# Patient Record
Sex: MALE | Race: BLACK OR AFRICAN AMERICAN | NOT HISPANIC OR LATINO | Employment: OTHER | URBAN - METROPOLITAN AREA
[De-identification: names, ages, dates, MRNs, and addresses within clinical notes are randomized per-mention and may not be internally consistent; named-entity substitution may affect disease eponyms.]

---

## 2019-03-01 ENCOUNTER — HOSPITAL ENCOUNTER (EMERGENCY)
Facility: CLINIC | Age: 64
Discharge: HOME | End: 2019-03-01
Attending: EMERGENCY MEDICINE | Admitting: EMERGENCY MEDICINE

## 2019-03-01 VITALS
RESPIRATION RATE: 16 BRPM | WEIGHT: 173 LBS | OXYGEN SATURATION: 98 % | HEART RATE: 83 BPM | TEMPERATURE: 98.6 F | SYSTOLIC BLOOD PRESSURE: 140 MMHG | DIASTOLIC BLOOD PRESSURE: 79 MMHG | HEIGHT: 69 IN | BODY MASS INDEX: 25.62 KG/M2

## 2019-03-01 DIAGNOSIS — L03.213 PRESEPTAL CELLULITIS OF LEFT EYE: ICD-10-CM

## 2019-03-01 PROCEDURE — 99283 EMERGENCY DEPT VISIT LOW MDM: CPT

## 2019-03-01 RX ORDER — CEFDINIR 300 MG/1
300 CAPSULE ORAL 2 TIMES DAILY
Qty: 20 CAPSULE | Refills: 0 | Status: SHIPPED | OUTPATIENT
Start: 2019-03-01 | End: 2019-03-11

## 2019-03-01 SDOH — HEALTH STABILITY: MENTAL HEALTH: HOW OFTEN DO YOU HAVE A DRINK CONTAINING ALCOHOL?: NEVER

## 2019-03-01 ASSESSMENT — ENCOUNTER SYMPTOMS
COUGH: 0
FEVER: 0
CHILLS: 0
EYE PAIN: 1
EYE REDNESS: 1
FACIAL SWELLING: 1

## 2019-03-01 ASSESSMENT — MIFFLIN-ST. JEOR: SCORE: 1567.16

## 2019-03-01 NOTE — ED PROVIDER NOTES
"  History     Chief Complaint:  Eye Problem    HPI   Francisco J Felipe is a 64 year old male who presents with left eye pain and swelling. About 3-4 days ago, the patient reports he first noticed some swelling and itchiness and notes he was rubbing his eye quite a bit as a result. Then two days ago, the patient states he developed some dry skin, so he applied an unknown medical ointment to his lid. However, since then, the patient notes his swelling and redness increased and extended to his upper lid as well, so he came to the ED for further evaluation. Here, he denies any vision changes, significant eye discharge, fevers, cough, congestion, or other acute symptoms.    Allergies:  No known drug allergies    Medications:    The patient is not currently taking any prescribed medications.    Past Medical History:    The patient does not have any past pertinent medical history.    Past Surgical History:    History reviewed. No pertinent surgical history.    Family History:    History reviewed. No pertinent family history.     Social History:  Smoking status: No  Alcohol use: No  Drug use: No    Review of Systems   Constitutional: Negative for chills and fever.   HENT: Positive for facial swelling (left eye). Negative for congestion.    Eyes: Positive for pain and redness. Negative for visual disturbance.   Respiratory: Negative for cough.    All other systems reviewed and are negative.    Physical Exam     Patient Vitals for the past 24 hrs:   BP Temp Temp src Pulse Resp SpO2 Height Weight   03/01/19 1033 140/79 98.6  F (37  C) Oral 83 16 98 % 1.74 m (5' 8.5\") 78.5 kg (173 lb)     Visual Acuity:  Right - 20/15  Left - 20/40  Corrective lenses: glasses    Physical Exam  Nursing note and vitals reviewed.  Constitutional:  Appears well-developed and well-nourished.   HENT:   Head:    Atraumatic. TMs are clear.   Mouth/Throat:   Oropharynx is clear and moist. No oropharyngeal exudate.   Eyes:    Mild erythema with slight swelling " to left upper and lower eyelids without warmth or eye drainage. There is mild conjunctival injection without swelling. Pupils are equal, round, and reactive to light. EOMI.  Neck:    Normal range of motion. Neck supple.      No tracheal deviation present. No thyromegaly present.   Cardiovascular:  Normal rate, regular rhythm, no murmur   Pulmonary/Chest: Breath sounds are clear and equal without wheezes or crackles.  Abdominal:   Soft. Bowel sounds are normal. Exhibits no distension and      no mass. There is no tenderness.      There is no rebound and no guarding.   Musculoskeletal:  Exhibits no edema.   Lymphadenopathy:  No cervical adenopathy.   Neurological:   Alert and oriented to person, place, and time.   Skin:    Skin is warm and dry. No rash noted. No pallor.     Emergency Department Course   Procedures:  Slit Lamp Exam:  Visual acuity (R): 20/15, (L): 20/40  Left upper and lower lids are erythematous and slightly swollen.  No foreign body noted in detailed exam upper and lower lids/margins  PERRLA, EOMI.  Anterior Chamber: WNL. No cells or flare, No hyphema. No hypopyon.   Cornea: No foreign body. Fluorescein staining: No corneal fluorescein uptake.    Emergency Department Course:  Past medical records, nursing notes, and vitals reviewed.  1141: I performed an exam of the patient and obtained history, as documented above.    1218: I performed a slit lamp exam, findings above. All findings and plan for home were discussed with the patient. Patient discharged home with instructions regarding supportive care, medications, and reasons to return. The importance of close follow-up was reviewed.     Impression & Plan    Medical Decision Making:  Francisco J Felipe is a 64 year old male who presents to the ED for evaluation of left eye pain and swelling. I found him to have mild preseptal cellulitis of the left eyelids without any sign of corneal infection or iritis. He was not having any sign of sepsis and there were no  vesicular lesions. I did not feel there was any concern for Herpes zoster. I considered an allergic reaction in the differential since he has been using a new eye ointment. He is not having any vision changes, but was instructed to return right away should he develop this. He was prescribed antibiotic treatment (Cefdinir) and discharged home with instructions to return and otherwise can follow-up with ophthalmology next week.     Diagnosis:    ICD-10-CM   1. Preseptal cellulitis of left eye L03.213     Disposition: Discharged to home    Discharge Medications:  cefdinir 300 MG capsule  Commonly known as:  OMNICEF  300 mg, Oral, 2 TIMES DAILY       Kanika Abraham  3/1/2019    EMERGENCY DEPARTMENT    I, Kanika Abraham, am serving as a scribe at 11:41 AM on 3/1/2019 to document services personally performed by Magalis Rosenthal MD based on my observations and the provider's statements to me.      Magalis Rosenthal MD  03/01/19 145

## 2019-03-01 NOTE — ED AVS SNAPSHOT
Emergency Department  [address] [place] [address]  Phone:  [phone]  Fax:  [phone]                                    Francisco J Felipe   MRN: [id number]    Department:   Emergency Department   Date of Visit:  3/1/2019           After Visit Summary Signature Page    I have received my discharge instructions, and my questions have been answered. I have discussed any challenges I see with this plan with the nurse or doctor.    ..........................................................................................................................................  Patient/Patient Representative Signature      ..........................................................................................................................................  Patient Representative Print Name and Relationship to Patient    ..................................................               ................................................  Date                                   Time    ..........................................................................................................................................  Reviewed by Signature/Title    ...................................................              ..............................................  Date                                               Time          22EPIC Rev 08/18

## 2024-09-23 ENCOUNTER — APPOINTMENT (OUTPATIENT)
Dept: CT IMAGING | Facility: CLINIC | Age: 69
DRG: 065 | End: 2024-09-23
Attending: EMERGENCY MEDICINE
Payer: MEDICARE

## 2024-09-23 ENCOUNTER — HOSPITAL ENCOUNTER (INPATIENT)
Facility: CLINIC | Age: 69
LOS: 3 days | Discharge: NURSING, REHAB OR LONG-TERM CARE | DRG: 065 | End: 2024-09-27
Attending: EMERGENCY MEDICINE | Admitting: INTERNAL MEDICINE
Payer: MEDICARE

## 2024-09-23 DIAGNOSIS — G08 CEREBRAL VENOUS SINUS THROMBOSIS: ICD-10-CM

## 2024-09-23 DIAGNOSIS — E11.65 UNCONTROLLED TYPE 2 DIABETES MELLITUS WITH HYPERGLYCEMIA (H): ICD-10-CM

## 2024-09-23 DIAGNOSIS — I67.2 INTRACRANIAL ATHEROSCLEROSIS: ICD-10-CM

## 2024-09-23 DIAGNOSIS — I63.50 POSTERIOR CIRCULATION STROKE (H): Primary | ICD-10-CM

## 2024-09-23 DIAGNOSIS — E78.5 HYPERLIPIDEMIA LDL GOAL <70: ICD-10-CM

## 2024-09-23 DIAGNOSIS — I82.4Y2 ACUTE DEEP VEIN THROMBOSIS (DVT) OF PROXIMAL VEIN OF LEFT LOWER EXTREMITY (H): ICD-10-CM

## 2024-09-23 LAB
ALBUMIN SERPL BCG-MCNC: 4.2 G/DL (ref 3.5–5.2)
ALP SERPL-CCNC: 81 U/L (ref 40–150)
ALT SERPL W P-5'-P-CCNC: 21 U/L (ref 0–70)
ANION GAP SERPL CALCULATED.3IONS-SCNC: 11 MMOL/L (ref 7–15)
AST SERPL W P-5'-P-CCNC: 18 U/L (ref 0–45)
ATRIAL RATE - MUSE: 74 BPM
BASOPHILS # BLD AUTO: 0 10E3/UL (ref 0–0.2)
BASOPHILS NFR BLD AUTO: 0 %
BILIRUB SERPL-MCNC: 0.3 MG/DL
BUN SERPL-MCNC: 20.4 MG/DL (ref 8–23)
CALCIUM SERPL-MCNC: 9.2 MG/DL (ref 8.8–10.4)
CHLORIDE SERPL-SCNC: 100 MMOL/L (ref 98–107)
CREAT SERPL-MCNC: 1.13 MG/DL (ref 0.67–1.17)
DIASTOLIC BLOOD PRESSURE - MUSE: NORMAL MMHG
EGFRCR SERPLBLD CKD-EPI 2021: 71 ML/MIN/1.73M2
EOSINOPHIL # BLD AUTO: 0.2 10E3/UL (ref 0–0.7)
EOSINOPHIL NFR BLD AUTO: 4 %
ERYTHROCYTE [DISTWIDTH] IN BLOOD BY AUTOMATED COUNT: 11.8 % (ref 10–15)
EST. AVERAGE GLUCOSE BLD GHB EST-MCNC: 321 MG/DL
GLUCOSE SERPL-MCNC: 304 MG/DL (ref 70–99)
HBA1C MFR BLD: 12.8 %
HCO3 SERPL-SCNC: 25 MMOL/L (ref 22–29)
HCT VFR BLD AUTO: 36.3 % (ref 40–53)
HGB BLD-MCNC: 12.5 G/DL (ref 13.3–17.7)
HOLD SPECIMEN: NORMAL
IMM GRANULOCYTES # BLD: 0 10E3/UL
IMM GRANULOCYTES NFR BLD: 0 %
INTERPRETATION ECG - MUSE: NORMAL
LYMPHOCYTES # BLD AUTO: 1.7 10E3/UL (ref 0.8–5.3)
LYMPHOCYTES NFR BLD AUTO: 27 %
MCH RBC QN AUTO: 31.7 PG (ref 26.5–33)
MCHC RBC AUTO-ENTMCNC: 34.4 G/DL (ref 31.5–36.5)
MCV RBC AUTO: 92 FL (ref 78–100)
MONOCYTES # BLD AUTO: 0.5 10E3/UL (ref 0–1.3)
MONOCYTES NFR BLD AUTO: 7 %
NEUTROPHILS # BLD AUTO: 3.9 10E3/UL (ref 1.6–8.3)
NEUTROPHILS NFR BLD AUTO: 62 %
NRBC # BLD AUTO: 0 10E3/UL
NRBC BLD AUTO-RTO: 0 /100
P AXIS - MUSE: 54 DEGREES
PLATELET # BLD AUTO: 203 10E3/UL (ref 150–450)
POTASSIUM SERPL-SCNC: 3.8 MMOL/L (ref 3.4–5.3)
PR INTERVAL - MUSE: 174 MS
PROT SERPL-MCNC: 7 G/DL (ref 6.4–8.3)
QRS DURATION - MUSE: 68 MS
QT - MUSE: 390 MS
QTC - MUSE: 432 MS
R AXIS - MUSE: 15 DEGREES
RBC # BLD AUTO: 3.94 10E6/UL (ref 4.4–5.9)
SODIUM SERPL-SCNC: 136 MMOL/L (ref 135–145)
SYSTOLIC BLOOD PRESSURE - MUSE: NORMAL MMHG
T AXIS - MUSE: 30 DEGREES
TROPONIN T SERPL HS-MCNC: 26 NG/L
VENTRICULAR RATE- MUSE: 74 BPM
WBC # BLD AUTO: 6.4 10E3/UL (ref 4–11)

## 2024-09-23 PROCEDURE — 36415 COLL VENOUS BLD VENIPUNCTURE: CPT | Performed by: EMERGENCY MEDICINE

## 2024-09-23 PROCEDURE — 70496 CT ANGIOGRAPHY HEAD: CPT | Mod: MA

## 2024-09-23 PROCEDURE — 250N000011 HC RX IP 250 OP 636: Performed by: EMERGENCY MEDICINE

## 2024-09-23 PROCEDURE — 84155 ASSAY OF PROTEIN SERUM: CPT | Performed by: EMERGENCY MEDICINE

## 2024-09-23 PROCEDURE — 83036 HEMOGLOBIN GLYCOSYLATED A1C: CPT | Performed by: EMERGENCY MEDICINE

## 2024-09-23 PROCEDURE — 70450 CT HEAD/BRAIN W/O DYE: CPT | Mod: MA

## 2024-09-23 PROCEDURE — 250N000009 HC RX 250: Performed by: EMERGENCY MEDICINE

## 2024-09-23 PROCEDURE — 99285 EMERGENCY DEPT VISIT HI MDM: CPT | Mod: 25

## 2024-09-23 PROCEDURE — 70498 CT ANGIOGRAPHY NECK: CPT | Mod: MA

## 2024-09-23 PROCEDURE — 84484 ASSAY OF TROPONIN QUANT: CPT | Performed by: EMERGENCY MEDICINE

## 2024-09-23 PROCEDURE — 85025 COMPLETE CBC W/AUTO DIFF WBC: CPT | Performed by: EMERGENCY MEDICINE

## 2024-09-23 PROCEDURE — 93005 ELECTROCARDIOGRAM TRACING: CPT

## 2024-09-23 PROCEDURE — 82465 ASSAY BLD/SERUM CHOLESTEROL: CPT | Performed by: INTERNAL MEDICINE

## 2024-09-23 RX ORDER — FUROSEMIDE 20 MG
20 TABLET ORAL
COMMUNITY
End: 2024-09-23

## 2024-09-23 RX ORDER — IOPAMIDOL 755 MG/ML
67 INJECTION, SOLUTION INTRAVASCULAR ONCE
Status: COMPLETED | OUTPATIENT
Start: 2024-09-23 | End: 2024-09-23

## 2024-09-23 RX ADMIN — SODIUM CHLORIDE 90 ML: 9 INJECTION, SOLUTION INTRAVENOUS at 23:33

## 2024-09-23 RX ADMIN — IOPAMIDOL 67 ML: 755 INJECTION, SOLUTION INTRAVENOUS at 23:32

## 2024-09-23 ASSESSMENT — ACTIVITIES OF DAILY LIVING (ADL)
ADLS_ACUITY_SCORE: 35
ADLS_ACUITY_SCORE: 33

## 2024-09-23 ASSESSMENT — COLUMBIA-SUICIDE SEVERITY RATING SCALE - C-SSRS
2. HAVE YOU ACTUALLY HAD ANY THOUGHTS OF KILLING YOURSELF IN THE PAST MONTH?: NO
6. HAVE YOU EVER DONE ANYTHING, STARTED TO DO ANYTHING, OR PREPARED TO DO ANYTHING TO END YOUR LIFE?: NO
1. IN THE PAST MONTH, HAVE YOU WISHED YOU WERE DEAD OR WISHED YOU COULD GO TO SLEEP AND NOT WAKE UP?: NO

## 2024-09-24 ENCOUNTER — APPOINTMENT (OUTPATIENT)
Dept: ULTRASOUND IMAGING | Facility: CLINIC | Age: 69
DRG: 065 | End: 2024-09-24
Attending: INTERNAL MEDICINE
Payer: MEDICARE

## 2024-09-24 ENCOUNTER — APPOINTMENT (OUTPATIENT)
Dept: PHYSICAL THERAPY | Facility: CLINIC | Age: 69
DRG: 065 | End: 2024-09-24
Attending: INTERNAL MEDICINE
Payer: MEDICARE

## 2024-09-24 ENCOUNTER — APPOINTMENT (OUTPATIENT)
Dept: SPEECH THERAPY | Facility: CLINIC | Age: 69
DRG: 065 | End: 2024-09-24
Attending: INTERNAL MEDICINE
Payer: MEDICARE

## 2024-09-24 ENCOUNTER — APPOINTMENT (OUTPATIENT)
Dept: MRI IMAGING | Facility: CLINIC | Age: 69
DRG: 065 | End: 2024-09-24
Attending: INTERNAL MEDICINE
Payer: MEDICARE

## 2024-09-24 ENCOUNTER — APPOINTMENT (OUTPATIENT)
Dept: CARDIOLOGY | Facility: CLINIC | Age: 69
DRG: 065 | End: 2024-09-24
Attending: STUDENT IN AN ORGANIZED HEALTH CARE EDUCATION/TRAINING PROGRAM
Payer: MEDICARE

## 2024-09-24 PROBLEM — G08 CEREBRAL VENOUS SINUS THROMBOSIS: Status: ACTIVE | Noted: 2024-09-24

## 2024-09-24 PROBLEM — E11.65 UNCONTROLLED TYPE 2 DIABETES MELLITUS WITH HYPERGLYCEMIA (H): Status: ACTIVE | Noted: 2024-09-24

## 2024-09-24 PROBLEM — I67.2 INTRACRANIAL ATHEROSCLEROSIS: Status: ACTIVE | Noted: 2024-09-24

## 2024-09-24 PROBLEM — I63.50 POSTERIOR CIRCULATION STROKE (H): Status: ACTIVE | Noted: 2024-09-24

## 2024-09-24 LAB
CHOLEST SERPL-MCNC: 219 MG/DL
GLUCOSE BLDC GLUCOMTR-MCNC: 166 MG/DL (ref 70–99)
GLUCOSE BLDC GLUCOMTR-MCNC: 170 MG/DL (ref 70–99)
GLUCOSE BLDC GLUCOMTR-MCNC: 176 MG/DL (ref 70–99)
GLUCOSE BLDC GLUCOMTR-MCNC: 188 MG/DL (ref 70–99)
GLUCOSE BLDC GLUCOMTR-MCNC: 191 MG/DL (ref 70–99)
GLUCOSE BLDC GLUCOMTR-MCNC: 224 MG/DL (ref 70–99)
HDLC SERPL-MCNC: 55 MG/DL
LDLC SERPL CALC-MCNC: 148 MG/DL
LVEF ECHO: NORMAL
NONHDLC SERPL-MCNC: 164 MG/DL
TRIGL SERPL-MCNC: 79 MG/DL
TROPONIN T SERPL HS-MCNC: 27 NG/L
UFH PPP CHRO-ACNC: 0.49 IU/ML
UFH PPP CHRO-ACNC: 0.72 IU/ML

## 2024-09-24 PROCEDURE — 255N000002 HC RX 255 OP 636: Performed by: STUDENT IN AN ORGANIZED HEALTH CARE EDUCATION/TRAINING PROGRAM

## 2024-09-24 PROCEDURE — 250N000013 HC RX MED GY IP 250 OP 250 PS 637: Performed by: EMERGENCY MEDICINE

## 2024-09-24 PROCEDURE — 250N000013 HC RX MED GY IP 250 OP 250 PS 637: Performed by: INTERNAL MEDICINE

## 2024-09-24 PROCEDURE — 85520 HEPARIN ASSAY: CPT | Performed by: INTERNAL MEDICINE

## 2024-09-24 PROCEDURE — 70553 MRI BRAIN STEM W/O & W/DYE: CPT | Mod: MG

## 2024-09-24 PROCEDURE — 97530 THERAPEUTIC ACTIVITIES: CPT | Mod: GP

## 2024-09-24 PROCEDURE — 99221 1ST HOSP IP/OBS SF/LOW 40: CPT | Performed by: STUDENT IN AN ORGANIZED HEALTH CARE EDUCATION/TRAINING PROGRAM

## 2024-09-24 PROCEDURE — 93971 EXTREMITY STUDY: CPT | Mod: LT

## 2024-09-24 PROCEDURE — 999N000208 ECHOCARDIOGRAM COMPLETE

## 2024-09-24 PROCEDURE — 92526 ORAL FUNCTION THERAPY: CPT | Mod: GN | Performed by: SPEECH-LANGUAGE PATHOLOGIST

## 2024-09-24 PROCEDURE — 92610 EVALUATE SWALLOWING FUNCTION: CPT | Mod: GN | Performed by: SPEECH-LANGUAGE PATHOLOGIST

## 2024-09-24 PROCEDURE — A9585 GADOBUTROL INJECTION: HCPCS | Performed by: INTERNAL MEDICINE

## 2024-09-24 PROCEDURE — 97116 GAIT TRAINING THERAPY: CPT | Mod: GP

## 2024-09-24 PROCEDURE — 99223 1ST HOSP IP/OBS HIGH 75: CPT | Performed by: INTERNAL MEDICINE

## 2024-09-24 PROCEDURE — 93306 TTE W/DOPPLER COMPLETE: CPT | Mod: 26 | Performed by: INTERNAL MEDICINE

## 2024-09-24 PROCEDURE — 120N000001 HC R&B MED SURG/OB

## 2024-09-24 PROCEDURE — 255N000002 HC RX 255 OP 636: Performed by: INTERNAL MEDICINE

## 2024-09-24 PROCEDURE — 258N000003 HC RX IP 258 OP 636: Performed by: INTERNAL MEDICINE

## 2024-09-24 PROCEDURE — 250N000012 HC RX MED GY IP 250 OP 636 PS 637: Performed by: INTERNAL MEDICINE

## 2024-09-24 PROCEDURE — 97161 PT EVAL LOW COMPLEX 20 MIN: CPT | Mod: GP

## 2024-09-24 PROCEDURE — 250N000011 HC RX IP 250 OP 636: Performed by: INTERNAL MEDICINE

## 2024-09-24 PROCEDURE — 36415 COLL VENOUS BLD VENIPUNCTURE: CPT | Performed by: INTERNAL MEDICINE

## 2024-09-24 RX ORDER — NICOTINE POLACRILEX 4 MG
15-30 LOZENGE BUCCAL
Status: DISCONTINUED | OUTPATIENT
Start: 2024-09-24 | End: 2024-09-27 | Stop reason: HOSPADM

## 2024-09-24 RX ORDER — CLOPIDOGREL BISULFATE 75 MG/1
75 TABLET ORAL AT BEDTIME
Status: DISCONTINUED | OUTPATIENT
Start: 2024-09-24 | End: 2024-09-24

## 2024-09-24 RX ORDER — ATORVASTATIN CALCIUM 40 MG/1
40 TABLET, FILM COATED ORAL EVERY EVENING
Status: DISCONTINUED | OUTPATIENT
Start: 2024-09-24 | End: 2024-09-24

## 2024-09-24 RX ORDER — ACETAMINOPHEN 325 MG/1
650 TABLET ORAL EVERY 4 HOURS PRN
Status: DISCONTINUED | OUTPATIENT
Start: 2024-09-24 | End: 2024-09-27 | Stop reason: HOSPADM

## 2024-09-24 RX ORDER — HEPARIN SODIUM 10000 [USP'U]/100ML
0-5000 INJECTION, SOLUTION INTRAVENOUS CONTINUOUS
Status: DISCONTINUED | OUTPATIENT
Start: 2024-09-24 | End: 2024-09-25

## 2024-09-24 RX ORDER — PROCHLORPERAZINE 25 MG
12.5 SUPPOSITORY, RECTAL RECTAL EVERY 12 HOURS PRN
Status: DISCONTINUED | OUTPATIENT
Start: 2024-09-24 | End: 2024-09-27 | Stop reason: HOSPADM

## 2024-09-24 RX ORDER — ACETAMINOPHEN 325 MG/10.15ML
650 LIQUID ORAL EVERY 4 HOURS PRN
Status: DISCONTINUED | OUTPATIENT
Start: 2024-09-24 | End: 2024-09-27 | Stop reason: HOSPADM

## 2024-09-24 RX ORDER — DEXTROSE MONOHYDRATE 25 G/50ML
25-50 INJECTION, SOLUTION INTRAVENOUS
Status: DISCONTINUED | OUTPATIENT
Start: 2024-09-24 | End: 2024-09-24

## 2024-09-24 RX ORDER — DEXTROSE MONOHYDRATE 25 G/50ML
25-50 INJECTION, SOLUTION INTRAVENOUS
Status: DISCONTINUED | OUTPATIENT
Start: 2024-09-24 | End: 2024-09-27 | Stop reason: HOSPADM

## 2024-09-24 RX ORDER — ATORVASTATIN CALCIUM 80 MG/1
80 TABLET, FILM COATED ORAL EVERY EVENING
Status: DISCONTINUED | OUTPATIENT
Start: 2024-09-24 | End: 2024-09-27 | Stop reason: HOSPADM

## 2024-09-24 RX ORDER — LIDOCAINE 40 MG/G
CREAM TOPICAL
Status: DISCONTINUED | OUTPATIENT
Start: 2024-09-24 | End: 2024-09-27 | Stop reason: HOSPADM

## 2024-09-24 RX ORDER — ONDANSETRON 4 MG/1
4 TABLET, ORALLY DISINTEGRATING ORAL EVERY 6 HOURS PRN
Status: DISCONTINUED | OUTPATIENT
Start: 2024-09-24 | End: 2024-09-27 | Stop reason: HOSPADM

## 2024-09-24 RX ORDER — ACETAMINOPHEN 650 MG/1
650 SUPPOSITORY RECTAL EVERY 4 HOURS PRN
Status: DISCONTINUED | OUTPATIENT
Start: 2024-09-24 | End: 2024-09-27 | Stop reason: HOSPADM

## 2024-09-24 RX ORDER — CLOPIDOGREL BISULFATE 75 MG/1
75 TABLET ORAL DAILY
Status: DISCONTINUED | OUTPATIENT
Start: 2024-09-25 | End: 2024-09-25

## 2024-09-24 RX ORDER — CLOPIDOGREL 300 MG/1
300 TABLET, FILM COATED ORAL ONCE
Status: COMPLETED | OUTPATIENT
Start: 2024-09-24 | End: 2024-09-24

## 2024-09-24 RX ORDER — ONDANSETRON 2 MG/ML
4 INJECTION INTRAMUSCULAR; INTRAVENOUS EVERY 6 HOURS PRN
Status: DISCONTINUED | OUTPATIENT
Start: 2024-09-24 | End: 2024-09-27 | Stop reason: HOSPADM

## 2024-09-24 RX ORDER — ASPIRIN 325 MG
325 TABLET ORAL DAILY
Status: DISCONTINUED | OUTPATIENT
Start: 2024-09-25 | End: 2024-09-25

## 2024-09-24 RX ORDER — NICOTINE POLACRILEX 4 MG
15-30 LOZENGE BUCCAL
Status: DISCONTINUED | OUTPATIENT
Start: 2024-09-24 | End: 2024-09-24

## 2024-09-24 RX ORDER — ASPIRIN 81 MG/1
81 TABLET ORAL AT BEDTIME
Status: DISCONTINUED | OUTPATIENT
Start: 2024-09-24 | End: 2024-09-24

## 2024-09-24 RX ORDER — PROCHLORPERAZINE MALEATE 5 MG
5 TABLET ORAL EVERY 6 HOURS PRN
Status: DISCONTINUED | OUTPATIENT
Start: 2024-09-24 | End: 2024-09-27 | Stop reason: HOSPADM

## 2024-09-24 RX ORDER — LABETALOL HYDROCHLORIDE 5 MG/ML
10-20 INJECTION, SOLUTION INTRAVENOUS EVERY 10 MIN PRN
Status: DISCONTINUED | OUTPATIENT
Start: 2024-09-24 | End: 2024-09-27 | Stop reason: HOSPADM

## 2024-09-24 RX ORDER — ASPIRIN 81 MG/1
324 TABLET, CHEWABLE ORAL ONCE
Status: COMPLETED | OUTPATIENT
Start: 2024-09-24 | End: 2024-09-24

## 2024-09-24 RX ORDER — GADOBUTROL 604.72 MG/ML
8 INJECTION INTRAVENOUS ONCE
Status: COMPLETED | OUTPATIENT
Start: 2024-09-24 | End: 2024-09-24

## 2024-09-24 RX ORDER — SODIUM CHLORIDE 9 MG/ML
INJECTION, SOLUTION INTRAVENOUS CONTINUOUS PRN
Status: DISCONTINUED | OUTPATIENT
Start: 2024-09-24 | End: 2024-09-25

## 2024-09-24 RX ORDER — HYDRALAZINE HYDROCHLORIDE 20 MG/ML
10-20 INJECTION INTRAMUSCULAR; INTRAVENOUS
Status: DISCONTINUED | OUTPATIENT
Start: 2024-09-24 | End: 2024-09-27 | Stop reason: HOSPADM

## 2024-09-24 RX ADMIN — INSULIN GLARGINE 10 UNITS: 100 INJECTION, SOLUTION SUBCUTANEOUS at 06:49

## 2024-09-24 RX ADMIN — ATORVASTATIN CALCIUM 80 MG: 80 TABLET, FILM COATED ORAL at 19:58

## 2024-09-24 RX ADMIN — HEPARIN SODIUM 950 UNITS/HR: 10000 INJECTION, SOLUTION INTRAVENOUS at 09:55

## 2024-09-24 RX ADMIN — SODIUM CHLORIDE: 9 INJECTION, SOLUTION INTRAVENOUS at 09:12

## 2024-09-24 RX ADMIN — HUMAN ALBUMIN MICROSPHERES AND PERFLUTREN 9 ML: 10; .22 INJECTION, SOLUTION INTRAVENOUS at 14:39

## 2024-09-24 RX ADMIN — GADOBUTROL 8 ML: 604.72 INJECTION INTRAVENOUS at 03:52

## 2024-09-24 RX ADMIN — METFORMIN HYDROCHLORIDE 500 MG: 500 TABLET ORAL at 17:16

## 2024-09-24 RX ADMIN — CLOPIDOGREL BISULFATE 300 MG: 300 TABLET, FILM COATED ORAL at 01:06

## 2024-09-24 RX ADMIN — INSULIN ASPART 2 UNITS: 100 INJECTION, SOLUTION INTRAVENOUS; SUBCUTANEOUS at 06:50

## 2024-09-24 RX ADMIN — ASPIRIN 81 MG CHEWABLE TABLET 324 MG: 81 TABLET CHEWABLE at 01:06

## 2024-09-24 ASSESSMENT — ACTIVITIES OF DAILY LIVING (ADL)
ADLS_ACUITY_SCORE: 31
ADLS_ACUITY_SCORE: 31
ADLS_ACUITY_SCORE: 35
ADLS_ACUITY_SCORE: 35
ADLS_ACUITY_SCORE: 31
ADLS_ACUITY_SCORE: 35
ADLS_ACUITY_SCORE: 31
ADLS_ACUITY_SCORE: 44
ADLS_ACUITY_SCORE: 31
ADLS_ACUITY_SCORE: 35
ADLS_ACUITY_SCORE: 31
ADLS_ACUITY_SCORE: 44
ADLS_ACUITY_SCORE: 31
ADLS_ACUITY_SCORE: 44
ADLS_ACUITY_SCORE: 35
ADLS_ACUITY_SCORE: 31
ADLS_ACUITY_SCORE: 31
ADLS_ACUITY_SCORE: 35
ADLS_ACUITY_SCORE: 44
DEPENDENT_IADLS:: INDEPENDENT
ADLS_ACUITY_SCORE: 31
ADLS_ACUITY_SCORE: 41

## 2024-09-24 NOTE — CONSULTS
Care Management Initial Consult    General Information  Assessment completed with: Patient,    Type of CM/SW Visit: Initial Assessment    Primary Care Provider verified and updated as needed: No (states was [facility] to the [facility]) but would like a clinic closer to [facility]; would like to establish care at the [facility].)   Readmission within the last 30 days: no previous admission in last 30 days      Reason for Consult: discharge planning  Advance Care Planning: Advance Care Planning Reviewed: no concerns identified          Communication Assessment  Patient's communication style: spoken language (English or Bilingual)    Hearing Difficulty or Deaf: no   Wear Glasses or Blind: yes    Cognitive  Cognitive/Neuro/Behavioral: .WDL except  Level of Consciousness: alert  Arousal Level: opens eyes spontaneously  Orientation: oriented x 4  Mood/Behavior: calm, cooperative  Best Language: 0 - No aphasia  Speech: clear, spontaneous, logical    Living Environment:   People in home: sibling(s)  brother Iglesia  Current living Arrangements: house      Able to return to prior arrangements: yes  Living Arrangement Comments: House has 3 steps to enter; is a rambler so bed and bath rooms are on main level    Family/Social Support:  Care provided by: self  Provides care for: no one  Marital Status: Single  Support system: Sibling(s)          Description of Support System: Supportive, Involved         Current Resources:   Patient receiving home care services: No        Community Resources: None  Equipment currently used at home: none  Supplies currently used at home:      Employment/Financial:  Employment Status: retired        Financial Concerns: none (denies)           Does the patient's insurance plan have a 3 day qualifying hospital stay waiver?  No    Lifestyle & Psychosocial Needs:  Social Determinants of Health     Food Insecurity: Low Risk  (9/24/2024)    Food Insecurity     Within the past  12 months, did you worry that your food would run out before you got money to buy more?: No     Within the past 12 months, did the food you bought just not last and you didn t have money to get more?: No   Depression: Not on file   Housing Stability: Low Risk  (9/24/2024)    Housing Stability     Do you have housing? : Yes     Are you worried about losing your housing?: No   Tobacco Use: Low Risk  (11/13/2023)    [facility]    Patient History     Smoking Tobacco Use: Never     Smokeless Tobacco Use: Never     Passive Exposure: Not on file   Financial Resource Strain: Low Risk  (9/24/2024)    Financial Resource Strain     Within the past 12 months, have you or your family members you live with been unable to get utilities (heat, electricity) when it was really needed?: No   Alcohol Use: Not At Risk (3/1/2019)    AUDIT-C     Frequency of Alcohol Consumption: Never     Average Number of Drinks: Not on file     Frequency of Binge Drinking: Not on file   Transportation Needs: Low Risk  (9/24/2024)    Transportation Needs     Within the past 12 months, has lack of transportation kept you from medical appointments, getting your medicines, non-medical meetings or appointments, work, or from getting things that you need?: No   Physical Activity: Not on file   Interpersonal Safety: Low Risk  (9/24/2024)    Interpersonal Safety     Do you feel physically and emotionally safe where you currently live?: Yes     Within the past 12 months, have you been hit, slapped, kicked or otherwise physically hurt by someone?: No     Within the past 12 months, have you been humiliated or emotionally abused in other ways by your partner or ex-partner?: No   Stress: Not on file   Social Connections: Not on file   Health Literacy: Not on file       Functional Status:  Prior to admission patient needed assistance:   Dependent ADLs:: Independent  Dependent IADLs:: Independent       Mental Health Status:          Chemical  Dependency Status:                Values/Beliefs:  Spiritual, Cultural Beliefs, Buddhism Practices, Values that affect care: no               Discussed  Partnership in Safe Discharge Planning  document with patient/family: No    Additional Information:  Met with patient at bedside; explained role in discharge planning.    Patient was admitted with facial droop and unsteady gait due to CVA.      Patient lives with his brother in their rambler house.  There are 3 steps to enter, otherwise bed and bath rooms are on the main level.  At baseline he is independent in his ADL/IADL's.  He drives.  His brother is supportive and anticipate he will be doing the driving.      SLP has assessed him and recommend outpatient therapy.  PT and OT recommendations are pending.    Patient states his primary clinic is the [facility].  He would like to establish care closer to home in the [place] or [facility].  His preference would be the [facility] clinic.  The earliest appointment was scheduled for:  Friday, October 11 at 8:10 am with provider Wilfred Rodriguez.      Next Steps:  follow for PT/OT recommendations and medical readiness.    Alise Balderas RN  Inpatient Float Care Coordinator  [facility]  [email or web address]

## 2024-09-24 NOTE — CONSULTS
Care Management Follow Up    Length of Stay (days): 0    Expected Discharge Date: 09/26/2024     Concerns to be Addressed: discharge planning     Patient plan of care discussed at interdisciplinary rounds: Yes    Anticipated Discharge Disposition:                Anticipated Discharge Services:    Anticipated Discharge DME:      Patient/family educated on Medicare website which has current facility and service quality ratings:    Education Provided on the Discharge Plan:    Patient/Family in Agreement with the Plan: yes    Referrals Placed by CM/SW:    Private pay costs discussed: Not applicable    Discussed  Partnership in Safe Discharge Planning  document with patient/family: No     Handoff Completed: No, handoff not indicated or clinically appropriate    Additional Information:  Duplicate consult.  Following for discharge planning.    Next Steps: awaiting PT/OT recommendations.    Alise Balderas RN  Inpatient Float Care Coordinator  [facility]  [email or web address]

## 2024-09-24 NOTE — CONSULTS
"[facility]    Stroke Consult Note    Reason for Consult: Stroke    Chief Complaint: Stroke Symptoms       MICHELLE Felipe is a 69 year old male with past medical history of diabetes and hypertension and dyslipidemia presented with acute onset of left facial droop, loss of balance, and left arm weakness for the past 2 days.  The patient is not on any antiplatelets at home.  He also acknowledges drinking carbonated beverages.  MRI of the brain showed right medial pontine stroke so stroke neurology was consulted.     Stroke Evaluation Summarized    MRI/Head CT Right medial pontine stroke, and small right frontal cortical stroke   Intracranial Vasculature No large vessel occlusion   Cervical Vasculature No significant stenosis     Echocardiogram EF 60%, no intracardiac thrombus   EKG/Telemetry Sinus rhythm   Other Testing Not Applicable      LDL  9/23/2024: 148 mg/dL   A1C  9/23/2024: 12.8 %   Troponin 9/23/2024: 27 ng/L       Impression  Acute ischemic stroke of right pontine area due to small-vessel occlusion   Small ischemic stroke in the right frontal area, embolic stroke of undetermined source  Uncontrolled diabetes  Dyslipidemia    Recommendations   Aspirin 325 mg daily  Plavix 75 mg daily for 3 weeks  Hemoglobin A1c goal below 7  Continue atorvastatin 80 mg daily, goal of LDL between 40 and 70  PT OT and ST  Outpatient cardiac monitoring for 14 days  Stroke education    Patient Follow-up    - in 6-8 weeks with general neurology ([phone])    Thank you for this consult. We will continue to follow.     The Stroke Staff is Dr. Jarrett.    Anabella Sparrow MD  Vascular Neurology Fellow    To page me or covering stroke neurology team member, click here: AMCOM  Choose \"On Call\" tab at top, then select \"NEUROLOGY/ALL SITES\" from middle drop-down box, press Enter, then look for \"stroke\" or \"telestroke\" for your site.  _____________________________________________________    Clinically " Significant Risk Factors Present on Admission                # Drug Induced Platelet Defect: home medication list includes an antiplatelet medication          # DMII: A1C = 12.8 % (Ref range: <5.7 %) within past 6 months               Past Medical History    No past medical history on file.  Medications   Home Meds  Prior to Admission medications    Medication Sig Start Date End Date Taking? Authorizing Provider   ASPIRIN 81 PO Take 81 mg by mouth. Prescribed as 81 mg daily. Reports taking every ~2-3 days, when he remembers   Yes Unknown, Entered By History   METFORMIN HCL PO Patient believes he takes 500 mg and states he takes it every ~2-3 days when he remembers but no fills in Surescripts or at [facility]   Yes Unknown, Entered By History       Scheduled Meds  Current Facility-Administered Medications   Medication Dose Route Frequency Provider Last Rate Last Admin    aspirin EC tablet 81 mg  81 mg Oral At Bedtime Appiah, Juan Trevino MD        atorvastatin (LIPITOR) tablet 40 mg  40 mg Oral QPM Appiah, Juan Trevino MD        clopidogrel (PLAVIX) tablet 75 mg  75 mg Oral At Bedtime Appiah, Juan Trevino MD        insulin aspart (NovoLOG) injection (RAPID ACTING)  1-7 Units Subcutaneous Q4H Appiah, Juan Trevino MD   2 Units at 09/24/24 0650    insulin glargine (LANTUS PEN) injection 10 Units  10 Units Subcutaneous QAM AC Appiah, Juan Trevino MD   10 Units at 09/24/24 0649    metFORMIN (GLUCOPHAGE) tablet 500 mg  500 mg Oral Daily with supper Appiah, Juan Trevino MD        sodium chloride (PF) 0.9% PF flush 3 mL  3 mL Intracatheter Q8H Appiah, Juan Trevino MD   3 mL at 09/24/24 0436       Infusion Meds  Current Facility-Administered Medications   Medication Dose Route Frequency Provider Last Rate Last Admin    sodium chloride 0.9 % infusion   Intravenous Continuous PRN Appiah, Juan Trevino MD           Allergies   No Known Allergies       PHYSICAL EXAMINATION   Temp:  [97.3  F (36.3  C)-97.8  F (36.6  C)] 97.5  F (36.4  C)  Pulse:   [62-80] 66  Resp:  [14-24] 18  BP: (161-181)/() 181/99  SpO2:  [98 %-100 %] 99 %    The patient is alert and oriented x 4, left facial droop noted, left arm is weaker compared to right arm, otherwise full strength, no sensory deficit, no dysmetria, heel-to-shin normal bilaterally    Stroke Scales    NIHSS  1a. Level of Consciousness 0-->Alert, keenly responsive   1b. LOC Questions 0-->Answers both questions correctly   1c. LOC Commands 0-->Performs both tasks correctly   2.   Best Gaze 0-->Normal   3.   Visual 0-->No visual loss   4.   Facial Palsy 1-->Minor paralysis (flattened nasolabial fold, asymmetry on smiling)   5a. Motor Arm, Left 0-->No drift, limb holds 90 (or 45) degrees for full 10 secs   5b. Motor Arm, Right 0-->No drift, limb holds 90 (or 45) degrees for full 10 secs   6a. Motor Leg, Left 0-->No drift, leg holds 30 degree position for full 5 secs   6b. Motor Leg, right 0-->No drift, leg holds 30 degree position for full 5 secs   7.   Limb Ataxia 0-->Absent   8.   Sensory 0-->Normal, no sensory loss   9.   Best Language 0-->No aphasia, normal   10. Dysarthria 1-->Mild-to-moderate dysarthria, patient slurs at least some words and, at worst, can be understood with some difficulty   11. Extinction and Inattention  0-->No abnormality   Total 2 (09/24/24 7534)       Imaging  I personally reviewed all imaging; relevant findings per HPI.    Labs Data   CBC  Recent Labs   Lab 09/23/24 2156   WBC 6.4   RBC 3.94*   HGB 12.5*   HCT 36.3*        Basic Metabolic Panel   Recent Labs   Lab 09/24/24  0613 09/24/24  0446 09/23/24 2156   NA  --   --  136   POTASSIUM  --   --  3.8   CHLORIDE  --   --  100   CO2  --   --  25   BUN  --   --  20.4   CR  --   --  1.13   * 224* 304*   ERIK  --   --  9.2     Liver Panel  Recent Labs   Lab 09/23/24  2156   PROTTOTAL 7.0   ALBUMIN 4.2   BILITOTAL 0.3   ALKPHOS 81   AST 18   ALT 21     INR  No lab results found.

## 2024-09-24 NOTE — ED NOTES
[facility]  ED Nurse Handoff Report    ED Chief complaint: Stroke Symptoms      ED Diagnosis:   Final diagnoses:   Posterior circulation stroke (H)   Intracranial atherosclerosis   Cerebral venous sinus thrombosis - Less likely contributor to presentation   Uncontrolled type 2 diabetes mellitus with hyperglycemia (H)       Code Status: Full Code    Allergies: No Known Allergies    Patient Story:   Patient arrives with unsteady gait, left sided facial droop, favoring left side for a few days. He reports this started on Friday but was not willing to go to the ER. He also reports 3 falls during these days. In ED he is unsteady on his feet. Labs and imaging obtained in ED. Medicated per MAR.     Focused Assessment:    Neuro: Alert, oriented x 4  Respiratory:Clear lung sounds, on room air   Cardiology:  WNL NSR   Gastrointestinal: soft, non tender, non distended   Genitourinary/Renal:  WNL  Musculoskeletal: moves all extremities   Skin: Intact skin, left leg is swollen which patient reports is a little worse than normal. Skin is dry.   Lines: 18g right forearm    Labs Ordered and Resulted from Time of ED Arrival to Time of ED Departure   COMPREHENSIVE METABOLIC PANEL - Abnormal       Result Value    Sodium 136      Potassium 3.8      Carbon Dioxide (CO2) 25      Anion Gap 11      Urea Nitrogen 20.4      Creatinine 1.13      GFR Estimate 71      Calcium 9.2      Chloride 100      Glucose 304 (*)     Alkaline Phosphatase 81      AST 18      ALT 21      Protein Total 7.0      Albumin 4.2      Bilirubin Total 0.3     TROPONIN T, HIGH SENSITIVITY - Abnormal    Troponin T, High Sensitivity 26 (*)    HEMOGLOBIN A1C - Abnormal    Estimated Average Glucose 321 (*)     Hemoglobin A1C 12.8 (*)    CBC WITH PLATELETS AND DIFFERENTIAL - Abnormal    WBC Count 6.4      RBC Count 3.94 (*)     Hemoglobin 12.5 (*)     Hematocrit 36.3 (*)     MCV 92      MCH 31.7      MCHC 34.4      RDW 11.8      Platelet Count 203      %  Neutrophils 62      % Lymphocytes 27      % Monocytes 7      % Eosinophils 4      % Basophils 0      % Immature Granulocytes 0      NRBCs per 100 WBC 0      Absolute Neutrophils 3.9      Absolute Lymphocytes 1.7      Absolute Monocytes 0.5      Absolute Eosinophils 0.2      Absolute Basophils 0.0      Absolute Immature Granulocytes 0.0      Absolute NRBCs 0.0     TROPONIN T, HIGH SENSITIVITY - Abnormal    Troponin T, High Sensitivity 27 (*)    LIPID PROFILE        CTA Head Neck with Contrast   Final Result   IMPRESSION:    HEAD CT:   1.  No acute intracranial abnormality.      2.  Age-related and chronic ischemic changes.      HEAD CTA:    1.  Incompletely occlusive to nearly occlusive thrombus involving the left transverse and sigmoid sinuses.      2.  No proximal arterial branch occlusion or aneurysm.      3.  Narrowing of the P2/P3 junctions bilaterally.      NECK CTA:   1.  Normal neck CTA.      Head CT w/o contrast   Final Result   IMPRESSION:    HEAD CT:   1.  No acute intracranial abnormality.      2.  Age-related and chronic ischemic changes.      HEAD CTA:    1.  Incompletely occlusive to nearly occlusive thrombus involving the left transverse and sigmoid sinuses.      2.  No proximal arterial branch occlusion or aneurysm.      3.  Narrowing of the P2/P3 junctions bilaterally.      NECK CTA:   1.  Normal neck CTA.      MR Brain w/o & w Contrast    (Results Pending)   US Lower Extremity Venous Duplex Left    (Results Pending)         Treatments and/or interventions provided:      Medications   aspirin EC tablet 81 mg (has no administration in time range)   metFORMIN (GLUCOPHAGE) tablet 500 mg (has no administration in time range)   iopamidol (ISOVUE-370) solution 67 mL (67 mLs Intravenous $Given 9/23/24 2332)   SalineFlush (90 mLs Intravenous $Given 9/23/24 2333)   aspirin (ASA) chewable tablet 324 mg (324 mg Oral $Given 9/24/24 0106)   clopidogrel (PLAVIX) tablet 300 mg (300 mg Oral $Given 9/24/24 0106)         Patient's response to treatments and/or interventions:   Resting comfortably    To be done/followed up on inpatient unit:    See any in-patient orders    Does this patient have any cognitive concerns?: Forgetful    Activity level - Baseline/Home:    Independent    Activity Level - Current:     Stand with Assist    Patient's Preferred language: English     Needed?: No    Isolation: None  Infection: Not Applicable  Patient tested for COVID 19 prior to admission: NO    Bariatric?: No    Vital Signs:   Vitals:    09/24/24 0100 09/24/24 0201 09/24/24 0206 09/24/24 0211   BP: (!) 181/102 (!) 176/108     Pulse: 75 67 66 68   Resp: 24  18 18   Temp:       SpO2: 100%  99% 98%   Weight:           Cardiac Rhythm:     Was the PSS-3 completed:   Yes    Family Comments: Family bedside    For the majority of the shift this patient's behavior was Green.   Behavioral interventions performed were     ED NURSE PHONE NUMBER: *[id number]

## 2024-09-24 NOTE — ED PROVIDER NOTES
Emergency Department Note      History of Present Illness     Chief Complaint   Stroke Symptoms      HPI   Francisco J Felipe is a 69 year old male with history of anemia, hypertension, and type 2 diabetes who presents with his brother for evaluation of stroke symptoms. Patient reports that 3 days ago on 9/20 he got up and felt off balance as if he might fall over.  Sat down and felt like everything was settling down.  Took an aspirin and went to sleep.  Then was at his girlfriend's house the next day and stumbled and fell hitting his left side.  When he got up she remarked that he was walking weird and visibly off balance.  Fell three times this past weekend.  No injuries, did not hit his head, no loss of consciousness.  Was planning n coming in tomorrow but his sister in law who is a nurse was concerned about stroke.  His brother drove to his place from the [facility] and told him that if he was driving that far, then he was bring him in.  Speech more mellow and soft than usual.  New facial asymmetry.  No chest pain, neck pain, headache, vision changes, numbness, loss of strength.    No prior heart attack or heart surgeries   No tobacco use     History of diabetes    Independent Historian   Patient and brother    Review of External Notes   Last visible A1c was in 2022 12.2, states has had one months to a year or so ago at [place], this was the result I had seen     Past Medical History     Medical History and Problem List   Anemia  Bacteremia  Type 2 diabetes  Diabetic foot infection  Abscess of ankle  Abscess of right foot  Hypertension  Cellulitis of right lower extremity      Medications   Lasix  Metformin  Aspirin 81mg       Surgical History   Insert PICC      Physical Exam     Patient Vitals for the past 24 hrs:   BP Temp Pulse Resp SpO2 Weight   09/23/24 2130 (!) 176/96 97.8  F (36.6  C) 73 16 98 % 78.5 kg (173 lb)     Physical Exam  Eyes:  Sclera white; Pupils are equal and round  ENT:    External ears and  "nares normal  CV:  Rate as above with regular rhythm   Resp:  Breath sounds clear and equal bilaterally    Non-labored, no retractions or accessory muscle use  GI:  Abdomen is soft, non-tender, non-distended    No rebound tenderness or peritoneal features  MS:  Moves all extremities, no drift, no asymmetry to extremity movements  Skin:  Warm and dry  Neuro:  Speech is mildly slurred, takes three tries on \"no ifs, ands, or buts\" and still does not enunciate it correctly, flattened nasolabial fold, no limb ataxia on finger to nose, full EOMI, immediately unsteady once stood and has wide based stance.  Closed his eyes and upper body started wavering.  Eyes opened.  Gait very small almost shuffling steps with some asymmetry, no foot drop.      Diagnostics     Lab Results   Labs Ordered and Resulted from Time of ED Arrival to Time of ED Departure   COMPREHENSIVE METABOLIC PANEL - Abnormal       Result Value    Sodium 136      Potassium 3.8      Carbon Dioxide (CO2) 25      Anion Gap 11      Urea Nitrogen 20.4      Creatinine 1.13      GFR Estimate 71      Calcium 9.2      Chloride 100      Glucose 304 (*)     Alkaline Phosphatase 81      AST 18      ALT 21      Protein Total 7.0      Albumin 4.2      Bilirubin Total 0.3     TROPONIN T, HIGH SENSITIVITY - Abnormal    Troponin T, High Sensitivity 26 (*)    HEMOGLOBIN A1C - Abnormal    Estimated Average Glucose 321 (*)     Hemoglobin A1C 12.8 (*)    CBC WITH PLATELETS AND DIFFERENTIAL - Abnormal    WBC Count 6.4      RBC Count 3.94 (*)     Hemoglobin 12.5 (*)     Hematocrit 36.3 (*)     MCV 92      MCH 31.7      MCHC 34.4      RDW 11.8      Platelet Count 203      % Neutrophils 62      % Lymphocytes 27      % Monocytes 7      % Eosinophils 4      % Basophils 0      % Immature Granulocytes 0      NRBCs per 100 WBC 0      Absolute Neutrophils 3.9      Absolute Lymphocytes 1.7      Absolute Monocytes 0.5      Absolute Eosinophils 0.2      Absolute Basophils 0.0      Absolute " Immature Granulocytes 0.0      Absolute NRBCs 0.0     TROPONIN T, HIGH SENSITIVITY - Abnormal    Troponin T, High Sensitivity 27 (*)    LIPID PROFILE       Imaging   CTA Head Neck with Contrast   Final Result   IMPRESSION:    HEAD CT:   1.  No acute intracranial abnormality.      2.  Age-related and chronic ischemic changes.      HEAD CTA:    1.  Incompletely occlusive to nearly occlusive thrombus involving the left transverse and sigmoid sinuses.      2.  No proximal arterial branch occlusion or aneurysm.      3.  Narrowing of the P2/P3 junctions bilaterally.      NECK CTA:   1.  Normal neck CTA.      Head CT w/o contrast   Final Result   IMPRESSION:    HEAD CT:   1.  No acute intracranial abnormality.      2.  Age-related and chronic ischemic changes.      HEAD CTA:    1.  Incompletely occlusive to nearly occlusive thrombus involving the left transverse and sigmoid sinuses.      2.  No proximal arterial branch occlusion or aneurysm.      3.  Narrowing of the P2/P3 junctions bilaterally.      NECK CTA:   1.  Normal neck CTA.      MR Brain w/o & w Contrast    (Results Pending)   US Lower Extremity Venous Duplex Left    (Results Pending)     ECG results from 09/23/24   EKG 12-lead, tracing only     Value    Systolic Blood Pressure     Diastolic Blood Pressure     Ventricular Rate 74    Atrial Rate 74    NY Interval 174    QRS Duration 68        QTc 432    P Axis 54    R AXIS 15    T Axis 30    Interpretation ECG      Sinus rhythm  Normal ECG  No previous ECGs available  Confirmed by GENERATED REPORT, COMPUTER ([id number]),  Jan Magaña ([id number]) on 9/23/2024 10:37:27 PM        Agree, Dr Moody, at 2240, no prior     Independent Interpretation   CT Head: No intracranial hemorrhage.    ED Course      Medications Administered   Medications   aspirin EC tablet 81 mg (has no administration in time range)   metFORMIN (GLUCOPHAGE) tablet 500 mg (has no administration in time range)   gadobutrol (GADAVIST) injection 8 mL  (has no administration in time range)   iopamidol (ISOVUE-370) solution 67 mL (67 mLs Intravenous $Given 9/23/24 2332)   SalineFlush (90 mLs Intravenous $Given 9/23/24 2333)   aspirin (ASA) chewable tablet 324 mg (324 mg Oral $Given 9/24/24 0106)   clopidogrel (PLAVIX) tablet 300 mg (300 mg Oral $Given 9/24/24 0106)       Procedures   Procedures     Discussion of Management   Per ED course    ED Course   ED Course as of 09/24/24 0342   Mon Sep 23, 2024   2153 I obtained patient history and performed a physical exam.    Tue Sep 24, 2024   0038 I spoke with stroke neurology.  With lack of headache, seizures, or focal right side deficits the sinus thrombosis is less likely symptomatic and the stenosis of the P2/P3 is more concerning for ischemic stroke causing his symptoms.  SBP <220.  Aspirin and Plavix load.  MRI.  No MRV.       Additional Documentation  None    Medical Decision Making / Diagnosis     ROSARIO Felipe is a 69 year old male with symptoms concerning for subacute stroke.  He is outside the time window for thrombolytics and thrombectomy and code stroke process is not appropriate.  CTs without hemorrhage.  Patient and family updated on findings and need to consult stroke neurology.  Based on the consultation, this is more likely to be ischemic rather than related to thrombosis noted on his CT.  Aspirin and Plavix initial dose ordered.  Keep systolic blood pressure below 220.  Admission discussed with Dr. Appiah, hospitalist, who ordered his MRI.  Uncontrolled diabetes will need more aggressive and consistent therapy and follow-up.    Disposition   The patient was admitted to the hospital.     Diagnosis     ICD-10-CM    1. Posterior circulation stroke (H)  I63.50       2. Intracranial atherosclerosis  I67.2       3. Cerebral venous sinus thrombosis  G08     Less likely contributor to presentation      4. Uncontrolled type 2 diabetes mellitus with hyperglycemia (H)  E11.65              Scribe Disclosure:  I,  Jeanine Garcia, am serving as a scribe at 9:59 PM on 9/23/2024 to document services personally performed by Jocelyne Moody MD based on my observations and the provider's statements to me.        Jocelyne Moody MD  09/24/24 0354

## 2024-09-24 NOTE — PROVIDER NOTIFICATION
MD Notification    Notified Person: MD    Notified Person Name: Carol    Notification Date/Time: 9/24/24 @ 0900    Notification Interaction: Vocera    Purpose of Notification: FYI pt positive for DVT in LLE    Comments: Per stroke neurology okay to anticoagulant pt

## 2024-09-24 NOTE — H&P
[facility]    History and Physical - Hospitalist Service       Date of Admission:  9/23/2024    Assessment & Plan      Francisco J Felipe is a 69 year old male admitted on 9/23/2024. He presents to the emergency department for evaluation of left-sided facial droop and unsteady gait over the past 3 days.  Presentation and findings concerning for right MCA versus PCA stroke, MRI pending    Suspected right MCA territory CVA: Onset of symptoms 9/20/2024 with imbalance and facial droop.  Improved 9/21, but has persisted leading to presentation in the emergency department tonight.  In the setting of hypertension, uncontrolled type 2 diabetes.  -MRI brain with and without contrast pending  -Every 4 hours neurochecks and vital signs  -Stroke neurology consulted, aware of patient from emergency department  -75 mg Plavix at bedtime, 81 mg aspirin at bedtime.  Received initial aspirin and Plavix load in the emergency department  -Atorvastatin 40 mg daily; lipid panel pending  -Normal saline 100/h  -Permissive hypertension for now with long-term goal of normotension  -Fall precautions  -Physical therapy, Occupational Therapy, speech pathology consulted.    Left transverse sigmoid and sigmoid sinus occlusion: Noted on CTA.  No headache, no seizure history.  -neurology consulted.  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  -Seizure precautions    Left lower extremity swelling: Patient reports he has had left lower extremity swelling for the past year.  He describes a prior ultrasound evaluation through [facility].  I see arterial Doppler previously performed in 2022 through his podiatry team as well as a right lower extremity venous ultrasound in 2019  -Left lower extremity venous duplex ultrasound    Uncontrolled type 2 diabetes: History of diabetic foot ulcers, none currently present.  Nonadherent with metformin, utilizing a 500 mg dose every 3  days or so by his recollection.  Hemoglobin A1c of 12.8.  -Atorvastatin 40 mg daily initiating  -Aspirin and Plavix as above with plan for long-term aspirin  -Continue metformin 500 mg at bedtime; he reports some GI symptoms with his intermittent metformin use.  Encouraged him to maintain more consistent use of metformin to attempt to uptitrate, though if symptoms persist beyond 7 to 10 days, may consider discontinuing  -Initiating Lantus 10 units every morning; may need up titration of this  -Every 4 hour medium dose sliding scale insulin while NPO  -Hemohypoglycemia protocol in place  -When patient is cleared for oral intake by speech pathology, recommend initiating oral hypoglycemic agent to reduce anticipated insulin need and associated costs.    Charcot joint of left lower extremity:  -CROW boot has been recommended by podiatry during ambulation.  Can be utilized if patient is able to have this brought from home  -Fall precautions          Diet:  N.p.o. until cleared by speech pathology  DVT Prophylaxis: Pneumatic Compression Devices  Levine Catheter: Not present  Lines: None     Cardiac Monitoring: None  Code Status:  full code. Confirmed with patient on admission    Clinically Significant Risk Factors Present on Admission                # Drug Induced Platelet Defect: home medication list includes an antiplatelet medication          # DMII: A1C = 12.8 % (Ref range: <5.7 %) within past 6 months               Disposition Plan     Medically Ready for Discharge: Anticipated in 2-4 Days.  Suspect patient may benefit from acute rehabilitation with his acute stroke.  This was discussed with patient on admission.  Encouraged aggressive rehabilitation if offered           Juan Appiah MD  Hospitalist Service  [facility]  Securely message with [employer] (more info)  Text page via [employer] Paging/Directory     ______________________________________________________________________    Chief  Complaint   Unsteady gait, left-sided facial droop    History is obtained from the patient, chart review, discussion w/ Dr Moody in the emergency department, review of outside records including prior hemoglobin A1c of 12.2 in 2022.  Prior arterial ultrasound evaluation, but no recent venous ultrasound evaluation for what he describes as 1 year of left lower extremity swelling.    History of Present Illness   Francisco J Felipe is a 69 year old male who presents to the emergency department for evaluation of unsteady gait and left-sided facial droop present since approximately 9/20/2024.    Patient has a history of uncontrolled type 2 diabetes, hypertension.  Prior history of sepsis secondary to diabetic foot ulceration requiring hospitalization and prolonged antibiotics in 2021.  It appears that he has poor medical follow-up, and associated medication nonadherence.  Tells me that he currently takes metformin once every 3 days or so.  Similarly will take a baby aspirin once every several days, but not regularly.  Occasional use of a water pill; I see that he has been prescribed lisinopril hydrochlorothiazide in the past.    Patient reports that he has required insulin in the past, but his hemoglobin A1c improved and he was told that he no longer needed insulin.  I am not certain when this took place.  The most recent hemoglobin A1c I can find is 12.2 in 2022.    Tells me that his last follow-up was with podiatry approximately 6 months ago.  I do see a visit in November where he was recommended to utilize his CROW boot more frequently with his Charcot deformity of his left lower extremity.  I do not see recent primary care follow-up.    Patient had several falls with his unsteady gait while at home.  Came in with his significant other for evaluation tonight after noting more pronounced left-sided facial droop while shaving.    His hemoglobin A1c is elevated greater than 12.  Blood pressure is elevated in the 180 systolic range  at this time as well, but this may also be increased short-term in the setting of what appears to be an acute stroke.  A CTA was performed with some intracranial stenoses, but also question of left venous sinus thrombosis.  He has no headache, no seizures.  He has dysarthria and left-sided facial droop as well as some slowed rapid finger tap on left, weakness in his left hip flexors.    Patient's brother (one of two) is at bedside.  We discussed importance of managing chronic medical issues including hypertension and diabetes, ongoing close follow-up with primary care to adjust diabetes medications in the setting of stroke, importance of antiplatelet therapy.  Patient will need to reestablish care.  Previously followed through [facility], but again, his only recent follow-up visit was with podiatry.    Reports no coughing or choking with food.    His left lower extremity has 2+ pitting edema and is asymmetric as compared to the right.  He tells me that this has been present for the past year and describes prior ultrasound imaging.  I am able to see arterial ultrasound imaging from a few years ago, but last venous ultrasound was of the right lower extremity and occurred in 2019.    No fevers or chills.    Patient does not smoke.  He does not drink alcohol.    Past Medical History    Type 2 diabetes with left Charcot joint  History of diabetic foot ulcerations  Hypertension    Past Surgical History   Right ankle surgical intervention for diabetic foot ulcer     Prior to Admission Medications   Prior to Admission Medications   Prescriptions Last Dose Informant Patient Reported? Taking?   ASPIRIN 81 PO 9/23/2024  Yes Yes   Sig: Take 81 mg by mouth. Prescribed as 81 mg daily. Reports taking every ~2-3 days, when he remembers   METFORMIN HCL PO 9/23/2024  Yes Yes   Sig: Patient believes he takes 500 mg and states he takes it every ~2-3 days when he remembers but no fills in [employer] or at [employer]       Facility-Administered Medications: None           Physical Exam   Vital Signs: Temp: 97.8  F (36.6  C)   BP: (!) 169/96 Pulse: 67   Resp: 16 SpO2: 99 % O2 Device: None (Room air)    Weight: 173 lbs 0 oz    General Appearance: Comfortable appearing 69-year-old male resting on hospital bed.  He is in no distress.  Eyes: No scleral icterus or injection.  Extraocular motions are intact  HEENT: Atraumatic and normocephalic.  Left facial droop  Respiratory: Breath sounds are clear bilateral to auscultation.  No wheezes, no crackles.  Cardiovascular: Regular rate and rhythm.  No appreciable murmur.  GI: Abdomen soft, nontender to palpation.  No palpable mass.  Lymph/Hematologic: Left lower extremity 2+ pitting edema.  This is asymmetric as compared to right.  Genitourinary: Not examined  Skin: No rash.  No foot ulcers are appreciated.  Does have calluses  Musculoskeletal: Deformity of left  ankle consistent with Charcot joint.  Muscular tone and bulk intact otherwise.  Weak 5/5 hip flexors on the left as compared to right.  Slightly reduced  strength on left as compared to right as well  Neurologic: Slightly reduced  strength on left as well as slightly reduced hip flexors on left as compared to right.  Left facial droop present.  Rapid finger tap is slowed on left as compared to right.  Minimal word finding difficulty.  Tongue is midline, facial sensation intact.  No other cranial nerve deficits are appreciated.    Psychiatric: Very pleasant, normal affect    Medical Decision Making       80 MINUTES SPENT BY ME on the date of service doing chart review, history, exam, documentation & further activities per the note.      Data     I have personally reviewed the following data over the past 24 hrs:    6.4  \   12.5 (L)   / 203     136 100 20.4 /  304 (H)   3.8 25 1.13 \     ALT: 21 AST: 18 AP: 81 TBILI: 0.3   ALB: 4.2 TOT PROTEIN: 7.0 LIPASE: N/A     Trop: 27 (H) BNP: N/A     TSH: N/A T4: N/A A1C: 12.8 (H)        Imaging results reviewed over the past 24 hrs:   Recent Results (from the past 24 hour(s))   Head CT w/o contrast    Narrative    EXAM: CT HEAD W/O CONTRAST, CTA HEAD NECK W CONTRAST  LOCATION: [facility]  DATE: 9/23/2024    INDICATION: Facial droop, multiple falls this weekend, gait ataxia.  COMPARISON: None.  CONTRAST: 67 mL Isovue-370.  TECHNIQUE: Head and neck CT angiogram with IV contrast. Noncontrast head CT followed by axial helical CT images of the head and neck vessels obtained during the arterial phase of intravenous contrast administration. Axial 2D reconstructed images and   multiplanar 3D MIP reconstructed images of the head and neck vessels were performed by the technologist. Dose reduction techniques were used. All stenosis measurements made according to NASCET criteria unless otherwise specified.    FINDINGS:   NONCONTRAST HEAD CT:   INTRACRANIAL CONTENTS: No intracranial hemorrhage, extraaxial collection, or mass effect.  No CT evidence of acute infarct. Volume loss and presumed chronic small vessel ischemia.    VISUALIZED ORBITS/SINUSES/MASTOIDS: No intraorbital abnormality. No paranasal sinus mucosal disease. No middle ear or mastoid effusion.    BONES/SOFT TISSUES: No acute abnormality.    HEAD CTA:  ANTERIOR CIRCULATION: No stenosis/occlusion, aneurysm, or high flow vascular malformation. Standard Koi of Tiwari anatomy.    POSTERIOR CIRCULATION: Moderate-to-marked focal narrowing of the right P2/P3 junction and moderate changes on the left. No other high-grade stenosis, branch occlusion, or aneurysm. Balanced vertebral arteries supply a normal basilar artery.     DURAL VENOUS SINUSES: Incompletely occlusive to nearly completely occlusive thrombus in the left transverse sinus and extending into the left sigmoid sinus. Remainder of the visualized dural venous sinuses are grossly patent.    NECK CTA:  RIGHT CAROTID: No measurable stenosis or dissection.    LEFT  CAROTID: No measurable stenosis or dissection.    VERTEBRAL ARTERIES: No focal stenosis or dissection. Balanced vertebral arteries.    AORTIC ARCH: Classic aortic arch anatomy with no significant stenosis at the origin of the great vessels.    NONVASCULAR STRUCTURES: Unremarkable.      Impression    IMPRESSION:   HEAD CT:  1.  No acute intracranial abnormality.    2.  Age-related and chronic ischemic changes.    HEAD CTA:   1.  Incompletely occlusive to nearly occlusive thrombus involving the left transverse and sigmoid sinuses.    2.  No proximal arterial branch occlusion or aneurysm.    3.  Narrowing of the P2/P3 junctions bilaterally.    NECK CTA:  1.  Normal neck CTA.   CTA Head Neck with Contrast    Narrative    EXAM: CT HEAD W/O CONTRAST, CTA HEAD NECK W CONTRAST  LOCATION: [facility]  DATE: 9/23/2024    INDICATION: Facial droop, multiple falls this weekend, gait ataxia.  COMPARISON: None.  CONTRAST: 67 mL Isovue-370.  TECHNIQUE: Head and neck CT angiogram with IV contrast. Noncontrast head CT followed by axial helical CT images of the head and neck vessels obtained during the arterial phase of intravenous contrast administration. Axial 2D reconstructed images and   multiplanar 3D MIP reconstructed images of the head and neck vessels were performed by the technologist. Dose reduction techniques were used. All stenosis measurements made according to NASCET criteria unless otherwise specified.    FINDINGS:   NONCONTRAST HEAD CT:   INTRACRANIAL CONTENTS: No intracranial hemorrhage, extraaxial collection, or mass effect.  No CT evidence of acute infarct. Volume loss and presumed chronic small vessel ischemia.    VISUALIZED ORBITS/SINUSES/MASTOIDS: No intraorbital abnormality. No paranasal sinus mucosal disease. No middle ear or mastoid effusion.    BONES/SOFT TISSUES: No acute abnormality.    HEAD CTA:  ANTERIOR CIRCULATION: No stenosis/occlusion, aneurysm, or high flow vascular  malformation. Standard [place] of [place]wari anatomy.    POSTERIOR CIRCULATION: Moderate-to-marked focal narrowing of the right P2/P3 junction and moderate changes on the left. No other high-grade stenosis, branch occlusion, or aneurysm. Balanced vertebral arteries supply a normal basilar artery.     DURAL VENOUS SINUSES: Incompletely occlusive to nearly completely occlusive thrombus in the left transverse sinus and extending into the left sigmoid sinus. Remainder of the visualized dural venous sinuses are grossly patent.    NECK CTA:  RIGHT CAROTID: No measurable stenosis or dissection.    LEFT CAROTID: No measurable stenosis or dissection.    VERTEBRAL ARTERIES: No focal stenosis or dissection. Balanced vertebral arteries.    AORTIC ARCH: Classic aortic arch anatomy with no significant stenosis at the origin of the great vessels.    NONVASCULAR STRUCTURES: Unremarkable.      Impression    IMPRESSION:   HEAD CT:  1.  No acute intracranial abnormality.    2.  Age-related and chronic ischemic changes.    HEAD CTA:   1.  Incompletely occlusive to nearly occlusive thrombus involving the left transverse and sigmoid sinuses.    2.  No proximal arterial branch occlusion or aneurysm.    3.  Narrowing of the P2/P3 junctions bilaterally.    NECK CTA:  1.  Normal neck CTA.

## 2024-09-24 NOTE — CONSULTS
"[facility]    Stroke Telephone Note    I was called by Jocelyne Moody Md on 09/24/24 regarding patient Francisco J Felipe. The patient is a 69 year old male with known DM who is presenting with 3 days of left facial droop and gait unsteadiness (fell multiple times). No headache or vision changes. ED MD exam is significant for left faical droop, gait imbalance, and dysarthria.    Vitals  BP: (!) 169/96   Pulse: 67   Resp: 16   Temp: 97.8  F (36.6  C)   Weight: 78.5 kg (173 lb)    Imaging Findings  CT head: no evidence of acute hemorrhage, ischemia, no edema.   CTA head/neck: bilat PCA stenosis, proximal left transverse sinus non-opacification.     Impression  Acute ischemic stroke of unknown location due to undetermined etiology as workup is not complete yet.   I don't think this is a case of venous sinus thrombosis because patient does not have headache, which is the most common symptom, and there is no edema or hemorrhagic infarction of in the expected territory. Also the facial droop is on the wrong side. I think it's either a hypoplastic sinus or chronic occlusion.     Recommendations  - Use orderset: \"Ischemic Stroke Routine Admission\" or \"Ischemic Stroke No Thrombolytics/No Thrombectomy ICU Admission\"  - Place Neurology IP Stroke Consult order   - Neurochecks and Vital Signs every 4 hours   - Permissive HTN; goal SBP < 220 mmHg  - Daily aspirin 325 mg for secondary stroke prevention  - Plavix (clopidogrel) 300 mg PO loading dose x 1  - Plavix (clopidogrel) 75 mg PO Daily  - Statin: atorvastatin 40  - MRI Brain with and without contrast   - TTE without Bubble Study  - Telemetry, EKG  - Bedside Glucose Monitoring  - A1c, Lipid Panel, Troponin x 3  - PT/OT/SLP  - Stroke Education  - Euthermia, Euglycemia    My recommendations are based on the information provided over the phone by Francisco J Felipe's in-person providers. They are not intended to replace the clinical judgment of his in-person " "providers. I was not requested to personally see or examine the patient at this time.     Martin Reid MD, Msc, NAVIN, FAAN   of Neurology  [facility]     09/24/2024 12:43 AM  To page me or covering stroke neurology team member, click here: AMCOM  Choose \"On Call\" tab at top, then search dropdown box for \"Neurology Adult\" & press Enter, look for Neuro ICU/Stroke    "

## 2024-09-24 NOTE — PLAN OF CARE
Goal Outcome Evaluation:                 Outcome Evaluation: Discharge to home vs TCU/ARU pending PT/OT recommendations.

## 2024-09-24 NOTE — PLAN OF CARE
Pt here with multiple falls, R acute infarct, and LLE DVT. A&O x4. Neuros L facial droop and generalized weakness. VSS, SBP<220. Tele SB. Mod carb easy to chew diet, thin liquids. Takes pills whole. Heparin running at 950 untis/hr, recheck at 2251. Up with Ax1 GBW. Denies pain. Pt scoring green on the Aggression Stop Light Tool. US and Echo completed this shift. Plan continue stroke workup. Discharge pending.

## 2024-09-24 NOTE — PHARMACY-CONSULT NOTE
Pharmacy Consult to evaluate for medication related stroke core measures    Francisco J Felipe, 69 year old male admitted for stroke on 9/23/2024.    Thrombolytic was not given because of Clinical contraindications    VTE Prophylaxis SCDs /PCDs placed on 9/24/24, as appropriate prior to end of hospital day 2.    Antithrombotic: aspirin and clopidogrel started on 9/24/24, then adjusted to low intensity heparin, as appropriate by end of hospital day 2. Continue antithrombotic therapy on discharge to meet quality measures, unless contraindicated.    Anticoagulation if history of A-fib/flutter: Patient does not have history of A-fib/flutter - anticoagulation not required for medication related stroke core measures.     LDL Cholesterol Calculated   Date Value Ref Range Status   09/23/2024 148 (H) <100 mg/dL Final       Patient currently receiving Lipitor (atorvastatin) continue statin on discharge to meet quality measures, unless contraindicated.    Recommendations: None at this time    Thank you for the consult.    Radha Zafar, [facility] 9/24/2024 2:05 PM

## 2024-09-24 NOTE — PROGRESS NOTES
09/24/24 0912   Appointment Info   Signing Clinician's Name / Credentials (SLP) Quynh Navarro MS CCC SLP   General Information   Onset of Illness/Injury or Date of Surgery 09/23/24   Referring Physician Lashonda Whyte   Patient/Family Therapy Goal Statement (SLP) Patient is thirsty.   Pertinent History of Current Problem The patient is a 69 year old male with known DM who is presenting with 3 days of left facial droop and gait unsteadiness (fell multiple times). No headache or vision changes. ED MD exam is significant for left faical droop, gait imbalance, and dysarthria. MRI revealed; Small acute infarct right hemipons. No hemorrhage or mass effect.   General Observations Pleasant and cooperative with minimal dysarthria.   Type of Evaluation   Type of Evaluation Swallow Evaluation   Oral Motor   Oral Musculature anomalies present   Structural Abnormalities none present   Mucosal Quality adequate   Dentition (Oral Motor)   Dentition (Oral Motor) natural dentition;adequate dentition   Facial Symmetry (Oral Motor)   Facial Symmetry (Oral Motor) left side impairment   Left Side Facial Asymmetry moderate impairment   Lip Function (Oral Motor)   Lip Range of Motion (Oral Motor) retraction impairment   Lip Strength (Oral Motor) left side;mild impairment   Retraction, Lip Range of Motion left side;moderate impairment   Tongue Function (Oral Motor)   Tongue ROM (Oral Motor) WNL   Jaw Function (Oral Motor)   Jaw Function (Oral Motor) WNL   Cough/Swallow/Gag Reflex (Oral Motor)   Soft Palate/Velum (Oral Motor) WNL   Volitional Throat Clear/Cough (Oral Motor) impaired;reduced strength   Volitional Swallow (Oral Motor) mildly delayed   Vocal Quality/Secretion Management (Oral Motor)   Vocal Quality (Oral Motor) breathy   General Swallowing Observations   Past History of Dysphagia No documented history.   Respiratory Support room air   Current Diet/Method of Nutritional Intake (General Swallowing Observations, NIS) NPO    Swallowing Evaluation Clinical swallow evaluation   Clinical Swallow Evaluation   Feeding Assistance no assistance needed   Clinical Swallow Evaluation Textures Trialed thin liquids;pureed;solid foods   Clinical Swallow Eval: Thin Liquid Texture Trial   Mode of Presentation, Thin Liquids cup;spoon;self-fed;fed by clinician   Volume of Liquid or Food Presented Ice chips and 4 oz of water   Oral Phase of Swallow premature pharyngeal entry   Pharyngeal Phase of Swallow intact   Diagnostic Statement Premature entry suspected without immediate or delaued sx of aspiration in single swallows.   Clinical Swallow Evaluation: Puree Solid Texture Trial   Mode of Presentation, Puree spoon;self-fed   Volume of Puree Presented 5 teaspoons of apple sauce   Oral Phase, Puree WFL   Pharyngeal Phase, Puree intact   Clinical Swallow Evaluation: Solid Food Texture Trial   Mode of Presentation self-fed   Volume Presented 1 saltine cracker   Oral Phase delayed AP movement;residue in oral cavity   Pharyngeal Phase impaired;repeated swallows;reduction in laryngeal movement   Diagnostic Statement Suspect some pharyngeal retention, but no vocal changes or increased WOB.   Swallowing Recommendations   Diet Consistency Recommendations easy to chew (level 7);thin liquids (level 0)   Supervision Level for Intake distant supervision needed   Mode of Delivery Recommendations bolus size, small;food moistened;no straws;slow rate of intake   Postural Recommendations none   Swallowing Maneuver Recommendations alternate food and liquid intake;extra swallow   Monitoring/Assistance Required (Eating/Swallowing) check mouth frequently for oral residue/pocketing;stop eating activities when fatigue is present;monitor for cough or change in vocal quality with intake   Recommended Feeding/Eating Techniques (Swallow Eval) maintain upright sitting position for eating;maintain upright posture during/after eating for 30 minutes   Medication Administration  Recommendations, Swallowing (SLP) Whole as tolerated one at a time.   Instrumental Assessment Recommendations VFSS (videofluoroscopic swallowing study)   Comment, Swallowing Recommendations Pending tolerance of diet a video swallow study maybe indicated.   General Therapy Interventions   Planned Therapy Interventions Dysphagia Treatment   Dysphagia treatment Modified diet education;Instruction of safe swallow strategies;Oropharyngeal exercise training   Clinical Impression   Criteria for Skilled Therapeutic Interventions Met (SLP Eval) Yes, treatment indicated   SLP Diagnosis mild oral/pharyngeal dysphagia   Risks & Benefits of therapy have been explained evaluation/treatment results reviewed;care plan/treatment goals reviewed;risks/benefits reviewed;current/potential barriers reviewed;participants voiced agreement with care plan;participants included;patient   Clinical Impression Comments Patient presents with mild oral and pharyngeal dysphagia at bedside. Patient with left lip weakness/ROM. Lingual function was grossly intact. He has minimal dysathria characterized by minimal imprecision and low volume/breath support. He demonstrated premature entry of thin liquids via the cup without immediate or delayed sx of aspiration. mastication was slow but sufficient with minimal to no oral residue after the swallow. No vocal changes noted.   Recommend: 1. IDDSI level 7 regular easy to chew foods with thin liquids. 2. Upright, no straws, small bites/sips, check for oral residue and alternate liquids/solids as needed. Hold diet if overt sx of aspiration present   SLP Total Evaluation Time   Eval: oral/pharyngeal swallow function, clinical swallow Minutes (89478) 15   SLP Goals   Therapy Frequency (SLP Eval) 5 times/week   SLP Predicted Duration/Target Date for Goal Attainment 10/03/24   SLP Goals Swallow;SLP Goal 1   SLP: Safely tolerate diet without signs/symptoms of aspiration Regular diet;Thin liquids;With use of swallow  precautions;Independently   SLP: Goal 1 Patient will complete 20 reps of labial exercises to improve strength and ROM.   Interventions   Interventions Quick Adds Swallowing Dysfunction   Swallowing Dysfunction &/or Oral Function for Feeding   Treatment of Swallowing Dysfunction &/or Oral Function for Feeding Minutes (32049) 10   Symptoms Noted During/After Treatment None   Treatment Detail/Skilled Intervention Patient provided ewducation on the sx of aspiration and rationale for a modified diet with swallow strategies. Patient able to implement at bedside.   SLP Discharge Planning   SLP Plan Meal f/u level 7 with thin and advance as tolerated any difficulty complete a video.   SLP Discharge Recommendation home with outpatient therapy services   SLP Rationale for DC Rec Patient's swallow and communication are below his baseline.  (Pending PT/OT needs.)   SLP Brief overview of current status  Patient with mild oral and pharyngeal dysphagia   Total Session Time   Total Session Time (sum of timed and untimed services) 25

## 2024-09-24 NOTE — PLAN OF CARE
Reason for Admission: Stroke    Cognitive/Mentation: A/Ox 2  Neuros/CMS: Stable ex L facial droop and generalized weakness  VS: VSS on RA. Permissive hypertension   Tele: SR.  GI/: Incontinent of bladder. No BM this shift  Pulmonary: LS clear.  Pain: Denies.     Drains/Lines: PIV SL  Skin: Left hip abrasion/scab, scattered scabs, cracked foot, LLE edema  Activity: No OOB this shift  Diet: NPO    Therapies recs: Pending  Discharge: TBD    Aggression Stoplight Tool: Green    End of shift summary: Patient arrived from the ED around 0400. Continue with plan of care.

## 2024-09-24 NOTE — PHARMACY-ADMISSION MEDICATION HISTORY
"Pharmacist Admission Medication History    Admission medication history is complete. The information provided in this note is only as accurate as the sources available at the time of the update.    Information Source(s): Patient, Patient's pharmacy, and [facility] via in-person and phone    Pertinent Information:   -patient reports he thinks is still supposed to be taking furosemide 20 mg. He thinks medication was prescribed as daily. [facility] they last filled this for him in March 2023 with directions of 20 mg BID. Not added to med list  -metformin: no fills per [email or web address]. Patient says he has a \"surplus.\" He thinks he has a 500 mg IR tablet but I am unable to confirm. He believes it is prescribed as 1 tablet daily. [facility] Everywhere has 1000 mg BID but this has a start date of 8/22/22 so unclear if this is up-to-date.  -pt states he takes his aspirin and metformin every ~2-3 days, when he remembers    Changes made to PTA medication list:  Added: all medications  Deleted: None  Changed: None    Allergies reviewed with patient and updates made in EHR: yes, no changes    Medication History Completed By: Johanna Brito RPH 9/23/2024 10:48 PM    Prior to Admission medications    Medication Sig Last Dose Taking? Auth Provider Long Term End Date   ASPIRIN 81 PO Take 81 mg by mouth. Prescribed as 81 mg daily. Reports taking every ~2-3 days, when he remembers 9/23/2024 Yes Unknown, Entered By History     METFORMIN HCL PO Patient believes he takes 500 mg and states he takes it every ~2-3 days when he remembers but [facility] 9/23/2024 Yes Unknown, Entered By History Yes          "

## 2024-09-25 ENCOUNTER — APPOINTMENT (OUTPATIENT)
Dept: PHYSICAL THERAPY | Facility: CLINIC | Age: 69
DRG: 065 | End: 2024-09-25
Payer: MEDICARE

## 2024-09-25 ENCOUNTER — APPOINTMENT (OUTPATIENT)
Dept: SPEECH THERAPY | Facility: CLINIC | Age: 69
DRG: 065 | End: 2024-09-25
Payer: MEDICARE

## 2024-09-25 ENCOUNTER — APPOINTMENT (OUTPATIENT)
Dept: CT IMAGING | Facility: CLINIC | Age: 69
DRG: 065 | End: 2024-09-25
Attending: STUDENT IN AN ORGANIZED HEALTH CARE EDUCATION/TRAINING PROGRAM
Payer: MEDICARE

## 2024-09-25 ENCOUNTER — ORDERS ONLY (AUTO-RELEASED) (OUTPATIENT)
Dept: MEDSURG UNIT | Facility: CLINIC | Age: 69
End: 2024-09-25

## 2024-09-25 ENCOUNTER — APPOINTMENT (OUTPATIENT)
Dept: OCCUPATIONAL THERAPY | Facility: CLINIC | Age: 69
DRG: 065 | End: 2024-09-25
Attending: INTERNAL MEDICINE
Payer: MEDICARE

## 2024-09-25 ENCOUNTER — APPOINTMENT (OUTPATIENT)
Dept: GENERAL RADIOLOGY | Facility: CLINIC | Age: 69
DRG: 065 | End: 2024-09-25
Attending: NURSE PRACTITIONER
Payer: MEDICARE

## 2024-09-25 DIAGNOSIS — I63.50 POSTERIOR CIRCULATION STROKE (H): ICD-10-CM

## 2024-09-25 LAB
ANION GAP SERPL CALCULATED.3IONS-SCNC: 10 MMOL/L (ref 7–15)
BUN SERPL-MCNC: 15 MG/DL (ref 8–23)
CALCIUM SERPL-MCNC: 9.1 MG/DL (ref 8.8–10.4)
CHLORIDE SERPL-SCNC: 104 MMOL/L (ref 98–107)
CREAT SERPL-MCNC: 0.94 MG/DL (ref 0.67–1.17)
EGFRCR SERPLBLD CKD-EPI 2021: 89 ML/MIN/1.73M2
ERYTHROCYTE [DISTWIDTH] IN BLOOD BY AUTOMATED COUNT: 11.7 % (ref 10–15)
GLUCOSE BLDC GLUCOMTR-MCNC: 137 MG/DL (ref 70–99)
GLUCOSE BLDC GLUCOMTR-MCNC: 137 MG/DL (ref 70–99)
GLUCOSE BLDC GLUCOMTR-MCNC: 139 MG/DL (ref 70–99)
GLUCOSE BLDC GLUCOMTR-MCNC: 152 MG/DL (ref 70–99)
GLUCOSE BLDC GLUCOMTR-MCNC: 155 MG/DL (ref 70–99)
GLUCOSE BLDC GLUCOMTR-MCNC: 194 MG/DL (ref 70–99)
GLUCOSE SERPL-MCNC: 155 MG/DL (ref 70–99)
HCO3 SERPL-SCNC: 23 MMOL/L (ref 22–29)
HCT VFR BLD AUTO: 37.3 % (ref 40–53)
HGB BLD-MCNC: 12.7 G/DL (ref 13.3–17.7)
MCH RBC QN AUTO: 31.1 PG (ref 26.5–33)
MCHC RBC AUTO-ENTMCNC: 34 G/DL (ref 31.5–36.5)
MCV RBC AUTO: 91 FL (ref 78–100)
PLATELET # BLD AUTO: 196 10E3/UL (ref 150–450)
POTASSIUM SERPL-SCNC: 4 MMOL/L (ref 3.4–5.3)
RBC # BLD AUTO: 4.09 10E6/UL (ref 4.4–5.9)
SODIUM SERPL-SCNC: 137 MMOL/L (ref 135–145)
UFH PPP CHRO-ACNC: 0.39 IU/ML
UFH PPP CHRO-ACNC: 0.46 IU/ML
WBC # BLD AUTO: 6.5 10E3/UL (ref 4–11)

## 2024-09-25 PROCEDURE — 93010 ELECTROCARDIOGRAM REPORT: CPT | Performed by: INTERNAL MEDICINE

## 2024-09-25 PROCEDURE — 250N000013 HC RX MED GY IP 250 OP 250 PS 637: Performed by: INTERNAL MEDICINE

## 2024-09-25 PROCEDURE — 250N000011 HC RX IP 250 OP 636: Performed by: INTERNAL MEDICINE

## 2024-09-25 PROCEDURE — G1010 CDSM STANSON: HCPCS

## 2024-09-25 PROCEDURE — 99232 SBSQ HOSP IP/OBS MODERATE 35: CPT | Performed by: STUDENT IN AN ORGANIZED HEALTH CARE EDUCATION/TRAINING PROGRAM

## 2024-09-25 PROCEDURE — 74018 RADEX ABDOMEN 1 VIEW: CPT

## 2024-09-25 PROCEDURE — 36415 COLL VENOUS BLD VENIPUNCTURE: CPT | Performed by: PSYCHIATRY & NEUROLOGY

## 2024-09-25 PROCEDURE — 97530 THERAPEUTIC ACTIVITIES: CPT | Mod: GP | Performed by: PHYSICAL THERAPIST

## 2024-09-25 PROCEDURE — 85520 HEPARIN ASSAY: CPT | Performed by: INTERNAL MEDICINE

## 2024-09-25 PROCEDURE — 99001 SPECIMEN HANDLING PT-LAB: CPT | Performed by: PSYCHIATRY & NEUROLOGY

## 2024-09-25 PROCEDURE — 36415 COLL VENOUS BLD VENIPUNCTURE: CPT | Performed by: INTERNAL MEDICINE

## 2024-09-25 PROCEDURE — 99291 CRITICAL CARE FIRST HOUR: CPT | Performed by: NURSE PRACTITIONER

## 2024-09-25 PROCEDURE — 93005 ELECTROCARDIOGRAM TRACING: CPT

## 2024-09-25 PROCEDURE — 97535 SELF CARE MNGMENT TRAINING: CPT | Mod: GO

## 2024-09-25 PROCEDURE — 250N000013 HC RX MED GY IP 250 OP 250 PS 637: Performed by: NURSE PRACTITIONER

## 2024-09-25 PROCEDURE — 120N000001 HC R&B MED SURG/OB

## 2024-09-25 PROCEDURE — 85027 COMPLETE CBC AUTOMATED: CPT | Performed by: INTERNAL MEDICINE

## 2024-09-25 PROCEDURE — 99233 SBSQ HOSP IP/OBS HIGH 50: CPT | Performed by: INTERNAL MEDICINE

## 2024-09-25 PROCEDURE — 97165 OT EVAL LOW COMPLEX 30 MIN: CPT | Mod: GO

## 2024-09-25 PROCEDURE — 97116 GAIT TRAINING THERAPY: CPT | Mod: GP | Performed by: PHYSICAL THERAPIST

## 2024-09-25 PROCEDURE — 92526 ORAL FUNCTION THERAPY: CPT | Mod: GN | Performed by: SPEECH-LANGUAGE PATHOLOGIST

## 2024-09-25 PROCEDURE — 80048 BASIC METABOLIC PNL TOTAL CA: CPT | Performed by: INTERNAL MEDICINE

## 2024-09-25 RX ORDER — HEPARIN SODIUM 10000 [USP'U]/100ML
0-5000 INJECTION, SOLUTION INTRAVENOUS CONTINUOUS
Status: DISCONTINUED | OUTPATIENT
Start: 2024-09-25 | End: 2024-09-25

## 2024-09-25 RX ORDER — LACTULOSE 10 G/15ML
20 SOLUTION ORAL ONCE
Status: COMPLETED | OUTPATIENT
Start: 2024-09-25 | End: 2024-09-25

## 2024-09-25 RX ORDER — HEPARIN SODIUM 10000 [USP'U]/100ML
0-5000 INJECTION, SOLUTION INTRAVENOUS CONTINUOUS
Status: ACTIVE | OUTPATIENT
Start: 2024-09-25 | End: 2024-09-25

## 2024-09-25 RX ADMIN — APIXABAN 10 MG: 5 TABLET, FILM COATED ORAL at 21:48

## 2024-09-25 RX ADMIN — METFORMIN HYDROCHLORIDE 500 MG: 500 TABLET ORAL at 17:07

## 2024-09-25 RX ADMIN — LACTULOSE 20 G: 20 SOLUTION ORAL at 20:20

## 2024-09-25 RX ADMIN — ONDANSETRON 4 MG: 4 TABLET, ORALLY DISINTEGRATING ORAL at 18:23

## 2024-09-25 RX ADMIN — HEPARIN SODIUM 1350 UNITS/HR: 10000 INJECTION, SOLUTION INTRAVENOUS at 18:44

## 2024-09-25 RX ADMIN — ATORVASTATIN CALCIUM 80 MG: 80 TABLET, FILM COATED ORAL at 21:48

## 2024-09-25 ASSESSMENT — ACTIVITIES OF DAILY LIVING (ADL)
ADLS_ACUITY_SCORE: 32
ADLS_ACUITY_SCORE: 31
ADLS_ACUITY_SCORE: 31
ADLS_ACUITY_SCORE: 32
ADLS_ACUITY_SCORE: 32
ADLS_ACUITY_SCORE: 31
ADLS_ACUITY_SCORE: 32
ADLS_ACUITY_SCORE: 34
ADLS_ACUITY_SCORE: 32
ADLS_ACUITY_SCORE: 35
ADLS_ACUITY_SCORE: 34
ADLS_ACUITY_SCORE: 31
ADLS_ACUITY_SCORE: 32
ADLS_ACUITY_SCORE: 31
ADLS_ACUITY_SCORE: 32
ADLS_ACUITY_SCORE: 31
ADLS_ACUITY_SCORE: 32
ADLS_ACUITY_SCORE: 32

## 2024-09-25 NOTE — CODE/RAPID RESPONSE
[facility]    RRT Note  9/25/2024   Time Called: 0553    Code Status: Full Code    I was called to evaluate Francisco J Felipe, who is a 69 year old male with significant past medical history for uncontrolled type 2 diabetes mellitus who was admitted on 9/24/2024 after presenting to emergency department with facial droop and unsteady gait with CT evidence notable for right medial pontine stroke and small right frontal cortical stroke.  During admission patient also found to have left lower extremity DVT initiated on heparin with stroke neurology's knowledge.    Rapid response called due to patient having episode of emesis with bradycardia.  Upon entering room rapid response team present, bedside nursing present, patient is alert, oriented, does not appear to be in immediate distress at this time.  EKG technician present as well with normal sinus rhythm and mild QTc prolongation at 463 noted.  Patient is able to answer all my questions appropriately.  He denies having any headache, fever, chills.  He denies any feeling of shortness of breath or chest pain.  He denies having an upset stomach or abdominal pain prior to emesis episode.  He does state that he had a feeling of needing to defecate prior to loss of consciousness occurring.    At end of RRT vitals noted stable at 137/84, 75 sinus rhythm, 98% on room air, and temperature 97.9.  After discussion with patient and nursing staff, patient amenable to abdominal x-ray to assess for stool burden given knowledge of constipation for at least the last 2 days.  Continue cardiac monitoring and neurological monitoring.  Rounding hospitalist Dr. Medina to aware.    Assessment & Plan     Syncope, suspect vasovagal: patient transitioned from chair to bed, noting feeling of having to have a bowel movement. Marco to 27-47 and became unresponsive per bedside nursing report. Came to quickly, alert, oriented.   -syncope, bradycardia episode, emesis. Differentials  include vasovagal syncope, evolution of stroke, PE,  SBO        INTERVENTIONS:  -NIHSS without acute concerns. 2 for droop and left hemiparesis   -abdominal xray. Reports no bm over 2 days and sensation of needing to defecate   -lactulose   -cardiac monitoring   -continue neuro checks   -Continue neurochecks        Discussed with and defer further cares to Dr. Carol Brumfield, DNP APRN CNP  [facility]  Securely message with the Vocera Web Console (learn more here)  Text page via [employer] Paging/Directory          Physical Exam   Vital Signs with Ranges:  Temp:  [97.3  F (36.3  C)-97.9  F (36.6  C)] 97.9  F (36.6  C)  Pulse:  [61-75] 75  Resp:  [16-20] 16  BP: (121-154)/(72-95) 137/84  SpO2:  [96 %-98 %] 98 %  No intake/output data recorded.                    Physical Exam  Eyes:      Pupils: Pupils are equal, round, and reactive to light.   Cardiovascular:      Rate and Rhythm: Normal rate.   Pulmonary:      Effort: Pulmonary effort is normal.   Abdominal:      General: There is distension.      Palpations: Abdomen is soft.      Comments: Hypoactive    Skin:     General: Skin is warm and dry.   Neurological:      Mental Status: He is alert and oriented to person, place, and time. Mental status is at baseline.      Motor: Weakness present.          Data     EKG:  Interpreted by PARESH Rivera CNP  Time reviewed: 1753  Symptoms at time of EKG: none   Rhythm: normal sinus   Rate: Normal  Axis: Normal  Ectopy: none  Conduction: normal  ST Segments/ T Waves: No ST-T wave changes  Q Waves: none  Comparison to prior: Unchanged    Clinical Impression: normal EKG    IMAGING: (X-ray/CT/MRI)   Recent Results (from the past 24 hour(s))   CT Head w/o Contrast    Narrative    CT SCAN OF THE HEAD WITHOUT CONTRAST   9/25/2024 11:47 AM     HISTORY: Stroke.    TECHNIQUE:  Axial images of the head and coronal reformations without  IV contrast material. Radiation dose for this scan was  reduced using  automated exposure control, adjustment of the mA and/or kV according  to patient size, or iterative reconstruction technique.    COMPARISON: MRI of the brain dated 9/24/2024. CT of the head  9/23/2024.    FINDINGS: Small area of asymmetric hypoattenuation involving the right  anterior paramedian aspect of the shalom (series 2 image 7), likely  representing the recent ischemic infarct seen on the MRI of 9/24/2024.  In retrospect, this also appears to have been present at least to some  degree on the head CT of 9/23/2024, although this area is partially  obscured by artifacts. No definite finding to suggest acute  intracranial hemorrhage or significant mass effect.    There is unchanged mild prominence of the size of the bilateral  lateral ventricles and the third ventricle, which may be due to ex  vacuo dilatation. There is mild generalized brain parenchymal volume  loss. Mild patchy nonspecific hypoattenuation in the cerebral white  matter, which may be due to chronic small vessel ischemic changes.    Visualized aspects of paranasal sinuses and mastoids are clear. The  calvarium appears intact.      Impression    IMPRESSION:  1. Findings in keeping with a subtle small recent ischemic infarct in  the right ventral shalom, much better characterized on the prior MRI  exam. No acute intracranial hemorrhage or significant mass effect.  2. Brain atrophy and presumed chronic small vessel ischemic changes,  as described.    FORREST MAR MD         SYSTEM ID:  [id number]       CBC with Diff:  Recent Labs   Lab Test 09/25/24  0557   WBC 6.5   HGB 12.7*   MCV 91             Comprehensive Metabolic Panel:  Recent Labs   Lab 09/25/24  1758 09/25/24  0729 09/25/24  0557 09/24/24  0446 09/23/24  2156   NA  --   --  137  --  136   POTASSIUM  --   --  4.0  --  3.8   CHLORIDE  --   --  104  --  100   CO2  --   --  23  --  25   ANIONGAP  --   --  10  --  11   *   < > 155*   < > 304*   BUN  --   --  15.0  --   20.4   CR  --   --  0.94  --  1.13   GFRESTIMATED  --   --  89  --  71   ERIK  --   --  9.1  --  9.2   PROTTOTAL  --   --   --   --  7.0   ALBUMIN  --   --   --   --  4.2   BILITOTAL  --   --   --   --  0.3   ALKPHOS  --   --   --   --  81   AST  --   --   --   --  18   ALT  --   --   --   --  21    < > = values in this interval not displayed.         Time Spent on this Encounter       CRITICAL CARE TIME  I spent 51 minutes of critical care time on the unit/floor managing the care of Francisco J Felipe. Upon evaluation, this patient had a high probability of imminent or life-threatening deterioration due to syncope which required my direct attention, intervention, and personal management. 100% of my time was spent at the bedside counseling the patient and/or coordinating care regarding services listed in this note.

## 2024-09-25 NOTE — PROGRESS NOTES
09/25/24 1101   Appointment Info   Signing Clinician's Name / Credentials (OT) Cindy GandhiStella godoy   Living Environment   People in Home sibling(s);significant other   Current Living Arrangements house   Home Accessibility stairs to enter home   Number of Stairs, Main Entrance 3   Transportation Anticipated family or friend will provide   Living Environment Comments Pt lives in a rambler with his siblings, he has a girlfriend who is over frequently as well. Pt states someone is there all the time if he needs assist. 3 VALERIE with all needs met on main level   Self-Care   Usual Activity Tolerance good   Current Activity Tolerance moderate   Equipment Currently Used at Home none   Fall history within last six months yes   Number of times patient has fallen within last six months 3   Activity/Exercise/Self-Care Comment Pt reports typically indep at baseline with functional mobility and self cares. Retired since DM2 got worse per pt report.   General Information   Onset of Illness/Injury or Date of Surgery 09/23/24   Referring Physician [place], Juan Trevino MD   Additional Occupational Profile Info/Pertinent History of Current Problem Francisco J Felipe is a 69 year old male admitted on 9/23/2024. He presents to the emergency department for evaluation of left-sided facial droop and unsteady gait over the past 3 days.  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  Left lower extremity ultrasound noted to have DVT.  Started on low intensity IV heparin drip per neurology recommendation on 9/24/2024.  Repeat CT head on 9/25/2024 if normal will increase the heparin drip to high intensity   Existing Precautions/Restrictions fall   Cognitive Status Examination   Orientation Status orientation to person, place and time   Cognitive Status Comments Unable to formally assess, pt left for CT during session   Visual Perception   Impact of Vision Impairment on Function  (Vision) Unable to formally assess on eval, pt needed to go for CT during session.   Sensory   Sensory Quick Adds sensation intact   Pain Assessment   Patient Currently in Pain No   Posture   Posture forward head position;protracted shoulders   Range of Motion Comprehensive   General Range of Motion bilateral upper extremity ROM WFL   Strength Comprehensive (MMT)   General Manual Muscle Testing (MMT) Assessment upper extremity strength deficits identified   Comment, General Manual Muscle Testing (MMT) Assessment R UE 5/5, L UE 4/5   Muscle Tone Assessment   Muscle Tone Quick Adds No deficits were identified   Coordination   Upper Extremity Coordination Left UE impaired   Functional Limitations Decreased speed;Fine motor ADL performance impaired   Bed Mobility   Bed Mobility supine-sit   Supine-Sit Hettinger (Bed Mobility) minimum assist (75% patient effort)   Assistive Device (Bed Mobility) bed rails  (HOB elevated)   Transfers   Transfers sit-stand transfer   Transfer Comments mod A   Activities of Daily Living   BADL Assessment/Intervention lower body dressing   Lower Body Dressing Assessment/Training   Hettinger Level (Lower Body Dressing) don;socks;supervision   Clinical Impression   Criteria for Skilled Therapeutic Interventions Met (OT) Yes, treatment indicated   OT Diagnosis Impaired functional mobility and self cares   OT Problem List-Impairments impacting ADL problems related to;activity tolerance impaired;cognition;coordination;mobility;range of motion (ROM);strength;post-surgical precautions;vision;sensory feedback   Assessment of Occupational Performance 1-3 Performance Deficits   Identified Performance Deficits dressing, toileting, functional mobility, home management   Planned Therapy Interventions (OT) ADL retraining;cognition;fine motor coordination training;neuromuscular re-education;ROM;strengthening;transfer training;progressive activity/exercise   Clinical Decision Making Complexity (OT)  problem focused assessment/low complexity   Risk & Benefits of therapy have been explained evaluation/treatment results reviewed;care plan/treatment goals reviewed   OT Total Evaluation Time   OT Eval, Low Complexity Minutes (16047) 10   OT Goals   Therapy Frequency (OT) Daily   OT Predicted Duration/Target Date for Goal Attainment 10/11/24   OT Goals Hygiene/Grooming;Upper Body Dressing;Lower Body Dressing;Toilet Transfer/Toileting;Cognition;Home Management;OT Goal 1   OT: Hygiene/Grooming supervision/stand-by assist;while standing   OT: Upper Body Dressing Supervision/stand-by assist;including set-up/clothing retrieval   OT: Lower Body Dressing Supervision/stand-by assist;including set-up/clothing retrieval   OT: Toilet Transfer/Toileting Supervision/stand-by assist;toilet transfer;cleaning and garment management   OT: Home Management Supervision/stand-by assist;with light demand household tasks;ambulatory level   OT: Cognitive Patient/caregiver will verbalize understanding of cognitive assessment results/recommendations as needed for safe discharge planning   OT: Goal 1 PAtient will demo understanding of HEP for L UE NMRE.   Interventions   Interventions Quick Adds Self-Care/Home Management   Self-Care/Home Management   Self-Care/Home Mgmt/ADL, Compensatory, Meal Prep Minutes (32153) 20   Symptoms Noted During/After Treatment (Meal Preparation/Planning Training) fatigue   Treatment Detail/Skilled Intervention Needing to go to CT during session, pt reports he needs to use the bathroom first. Bed mob with SBA. Sit to stand with mod A for lift off. FWW use for ambulation to BR. Toileting tx with min A, completes own pericares. Stands at sink with min A for balance for hand washing. Then ambulation to w/c with min A for transfer down to CT. Further assessment to be complete next date.   OT Discharge Planning   OT Plan assess vision and cognition - eval cut short as pt needed to go for a scan   OT Discharge  Recommendation (DC Rec) [facility] patient will benefit from intensive, interdisciplinary therapy.  Anticipate will be able to tolerate 3 hours of therapy per day   OT Rationale for DC Rec At this time, pt below baseline requiring min-mod A for sit to stand transfers and min A with mobility with FWW. L side weakness present. Pt would benefit from intensive rehab to increase indep with functioanl mobility and self-cares prior to return home.   OT Brief overview of current status Goals of therapy will be to address safe mobility and make recs for d/c to next level of care. Pt and RN will continue to follow all falls risk precautions as documented by RN staff while hospitalized

## 2024-09-25 NOTE — CONSULTS
Patient did not elect Medicare D and has no other prescription coverage.     Prices using [email or web address] discount card:     Xarelto: $544/mo   Eliquis: $549/mo   Dabigatran: $177/mo   Jantoven (warfarin): $16/mo     Discharge Pharmacy can dispense 1 mo free Xarelto or Eliquis, provided patient has not received a previous supply.    Free Eliquis and Xarelto are available through the mail to income-eligible patients.  Application and info for Eliquis at [employer] or by calling [phone].  Application and info for Xarelto at [employer] or [phone].     Florina Leonard  Pharmacy Technician/Liaison, Discharge Pharmacy   [phone] (voice or text)  [email or web address]    DOAC coverage check

## 2024-09-25 NOTE — PROGRESS NOTES
[facility]    Vascular Neurology Progress Note    Interval History     Hospital day 2: Francisco J was seen lying in bed. He reports no new symptoms. We discussed his CT, USLE findings and treatment with Heparin as well as need for repeat CT Head.    Low intensity heparin was initiated with anti-Xa UFH of 0.72 on 9/24. Need for repeat CT Head. If negative, high intensity heparin to be initiated following low intensity heparin dose.    SBP: 129-143    Hospital Course     Chief complaint: Stroke Symptoms    Francisco J Felipe is a 69 year old male with past medical history significant for DM2, hypertension, and hyperlipidemia. He presented to ED on 9/23 with left facial droop, gait imbalance with multiple falls, left arm weakness, and dysarthria. Family reported he was having these problems for past 2 days but he did not want to go to ED. Prescribed Aspirin 81 mg daily PO but reports inconsistent administration, taking every 2-3 days. Head CT showed no acute intracranial abnormality. Head CTA showed thrombus involving Left transverse and sigmoid sinuses as well as narrowing of P2/P3 junctions bilaterally. MRI of brain showed Right medial pontine stroke. Started on DAPT --  mg daily, Plavix loading dose 300 mg with subsequent 75 mg daily.    9/24/24: In course of stroke etiology work-up, lower extremity US demonstrated deep vein thrombosis in the left common femoral, profunda femoral, femoral, popliteal, and peroneal veins. DVT did not extend into external iliac vein. Dual antiplatelet therapy was held and low intensity heparin initiated at this time. Planned to repeat head CT to look for evidence of bleeding on 9/25.    Assessment and Plan     1. Acute ischemic stroke of right pontine area due to small-vessel occlusion   2. Small ischemic stroke in the right frontal area, ESUS  3. DVT in Left common femoral, profunda femoral, femoral, popliteal, and peroneal veins     -Neuro check q4hr  -Vitals  "q4hr  --SBP goal <220 mmHg  -Repeat CT Head (ordered)  -Anticoagulation   -Currently low intensity heparin with anti-Xa goal UFH 0.25-0.5.  If repeat head CT is negative finish low intensity heparin dose and initiate high intensity heparin with anti-Xa goal UFH0.3-0.7. After high intensity dose, anticoagulation with DOAC to be initiated. Appreciate PCP input on anticoagulation therapy.  - Cardiac monitoring   --Telemetry  - 14 days Zio patch (ordered)  -Dyslipidemia  - Continue atorvastatin 80 mg daily PO  - LDL goal 40-70  -Uncontrolled diabetes (currently hemoglobin A1c 12.8)  - Long-term hemoglobin A1c goal less than 7%  -Appreciate PCP recommendations for blood glucose control and insulin therapy    -PT/OT/SLP   -Appreciate recommendations  -Stroke education    DVT prophylaxis: SCDs until patient is up independently.    Patient Follow-up    - final recommendation pending work-up  - in 6-8 weeks with general neurology ([phone])    Thank you for this consult. No further recommendation from vascular neurology, will sign off.      The Stroke Fellow is Dr. Sparrow. The Stroke Staff is Dr. Jarrett.    Tj Castañeda  Medical Student  To page a member of the stroke/neurocritical care service, click here:  [facility]   Choose \"On Call\" tab at top, then search dropdown box for \"Neurology Adult\", select location, press Enter, then look for stroke/neuro ICU/telestroke.    Physical Examination     Temp: 97.9  F (36.6  C) Temp src: Oral BP: 129/72 Pulse: 62   Resp: 16 SpO2: 97 % O2 Device: None (Room air)      General Exam  General:  patient lying in bed without any acute distress    HEENT:  normocephalic/atraumatic  Pulmonary:  no respiratory distress     Neuro Exam  Mental Status:  alert, oriented x 3, follows commands, naming and repetition normal  Cranial Nerves:  visual fields intact, PERRL, EOMI with normal smooth pursuit, facial sensation intact and symmetric, hearing not formally tested but intact to conversation, Left " facial droop, mild dysarthria  Motor:  normal muscle tone and bulk, able to move all limbs spontaneously, strength 4/5 throughout upper and lower extremities, no pronator drift  Reflexes:   Deferred  Sensory:  light touch sensation intact and symmetric throughout upper and lower extremities, no extinction on double simultaneous stimulation   Coordination:  normal finger-to-nose and heel-to-shin bilaterally without dysmetria, rapid alternating movements symmetric  Station/Gait:  deferred    Stroke Scales       NIHSS  1a. Level of Consciousness 0-->Alert, keenly responsive   1b. LOC Questions 0-->Answers both questions correctly   1c. LOC Commands 0-->Performs both tasks correctly   2.   Best Gaze 0-->Normal   3.   Visual 0-->No visual loss   4.   Facial Palsy 1-->Minor paralysis (flattened nasolabial fold, asymmetry on smiling)   5a. Motor Arm, Left 0-->No drift, limb holds 90 (or 45) degrees for full 10 secs   5b. Motor Arm, Right 0-->No drift, limb holds 90 (or 45) degrees for full 10 secs   6a. Motor Leg, Left 0-->No drift, leg holds 30 degree position for full 5 secs   6b. Motor Leg, right 0-->No drift, leg holds 30 degree position for full 5 secs   7.   Limb Ataxia 0-->Absent   8.   Sensory 0-->Normal, no sensory loss   9.   Best Language 0-->No aphasia, normal   10. Dysarthria 1-->Mild-to-moderate dysarthria, patient slurs at least some words and, at worst, can be understood with some difficulty   11. Extinction and Inattention  0-->No abnormality   Total 2 (09/24/24 0006)       Imaging/Labs   (Bolded imaging and labs new and/or personally reviewed or re-reviewed by me today 9/25/24)    MRI/Head CT MRI with and w/o contrast:  1.  Small acute infarct right hemipons. No hemorrhage or mass effect.  2.  Age-related and chronic ischemic changes.   3.  Abnormal left transverse and sigmoid sinus flow voids corresponding to the abnormality on CTA.     CT Head w/o contrasat:  1.  No acute intracranial abnormality  2.   Age-related and chronic ischemic changes   Intracranial Vasculature CTA:  1.  Incompletely occlusive to nearly occlusive thrombus involving the left transverse and sigmoid sinuses  2.  No proximal arterial branch occlusion or aneurysm  3.  Narrowing of the P2 P3 junctions bilaterally   Cervical Vasculature CTA:  Normal neck CTA     Echocardiogram TTE:  1. The left ventricle is normal in structure, function and size. The visual ejection fraction is estimated at 60%.  2. The right ventricle is normal in structure, function and size.  3. There is no atrial shunt seen. A contrast injection (Bubble Study) was performed that was negative for flow across the interatrial septum.  4. No valve disease.   EKG/Telemetry Sinus rhythm. Normal EKG     LDL  9/23/2024: 148 mg/dL   A1C  9/23/2024: 12.8 %   Troponin 9/23/2024: 27 ng/L

## 2024-09-25 NOTE — PROVIDER NOTIFICATION
MD Notification    Notified Person: MD    Notified Person Name: Dr. Medina     Notification Date/Time: 9/25/24 0548    Notification Interaction: Vocera message     Purpose of Notification:     Responded to patient's call light and found patient with HR of 27 and completely unresponsive, sweaty, no response to sternal rub. Called for additional staff and then patient started responding. /90 HR in the 50's. Patient stated he has no recall of event. Neuro exam unchanged.       Orders Received: Patient projectile vomited. STAT EKG ordered per Dr. Medina and RRT activated    Comments:

## 2024-09-25 NOTE — PROGRESS NOTES
Care Management Follow Up    Length of Stay (days): 1    Expected Discharge Date: 09/26/2024     Concerns to be Addressed: discharge planning     Patient plan of care discussed at interdisciplinary rounds: Yes    Anticipated Discharge Disposition: Home, Outpatient Rehab (PT, OT, SLP, Cardiac or Pulmonary)              Anticipated Discharge Services:    Anticipated Discharge DME:      Patient/family educated on Medicare website which has current facility and service quality ratings:    Education Provided on the Discharge Plan:    Patient/Family in Agreement with the Plan: yes    Referrals Placed by CM/SW: Insurance Verification for medications  Private pay costs discussed: Not applicable    Discussed  Partnership in Safe Discharge Planning  document with patient/family: No     Handoff Completed: No, handoff not indicated or clinically appropriate    Additional Information:  Per Hospitalist, patient will need Eliquis at discharge and does not have Medicare part D.  He will be able to get first month free from hospital discharge pharmacy.  Met with patient and his johne.  Provided him with resources for possible discounted drug costs ([employer] card, [facility] assistance program and printed application from  [employer]).  They also had questions about ongoing rehab.  Explained recommendation for OPPT and process.  They were in agreement with this and johne will be able to take him to appointments.    Next Steps: Continue to follow for discharge needs.    Yelena Kimbrough RN, BSN, PHN  Inpatient Care Coordination  [facility]  Phone: [phone]

## 2024-09-25 NOTE — PLAN OF CARE
Pt here with R CVA, LLE DVT. A&Ox4, forgetful at times. Neuros L droop. VSS, <220. Tele NSR. Easy to chew diet, thin liquids. Takes pills whole. Up with assist x1, GB and walker. Denies pain. LLE with +2-3 edema. Heparin drip running at 1350 units/hr, stopping after first dose of Eliquis. Pt scoring green on the Aggression Stop Light Tool. Plan transition to DOAC. Discharge pending.

## 2024-09-25 NOTE — PLAN OF CARE
Pt here with multiple falls, R acute infarct, and LLE DVT. A&O x4. Neuros L facial droop and generalized weakness. VSS, SBP<220. Tele SR. Mod carb easy to chew diet, thin liquids. Takes pills whole. Heparin running at 650 units/hr, recheck tomorrow AM. Up with Ax1 GBW. Denies pain. Pt scoring green on the Aggression Stop Light Tool. Plan repeat CT head this AM. Discharge pending.

## 2024-09-25 NOTE — PROGRESS NOTES
[facility]    Medicine Progress Note - Hospitalist Service    Date of Admission:  9/23/2024    Assessment & Plan   Francisco J Felipe is a 69 year old male admitted on 9/23/2024. He presents to the emergency department for evaluation of left-sided facial droop and unsteady gait over the past 3 days.  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  Left lower extremity ultrasound noted to have DVT.  Started on low intensity IV heparin drip per neurology recommendation on 9/24/2024.  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  Improved 9/21, but has persisted leading to presentation in the emergency department tonight.  In the setting of hypertension, uncontrolled type 2 diabetes.  -MRI brain with and without contrast done showed small acute infarct right Robb shalom.  No hemorrhage or mass effect.  Age-related and chronic ischemic changes.  Abnormal left transverse and sigmoid sinus flow-voids corresponding to the abnormality on CTA  -Every 4 hours neurochecks and vital signs  -Stroke neurology consulted, aware of patient from emergency department  Patient was found to have left lower extremity DVT on 9/24/2024.  Started on low-dose heparin drip  CT head to be repeated on 9/25/2024 if looks okay will increase the heparin drip to high intensity  Will plan on switching him to oral Eliquis in the next 24 hours if he remains stable  Left transverse sigmoid and sigmoid sinus occlusion: Noted on CTA.  No headache, no seizure history.  -neurology consulted.  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  -Seizure precautions     Left lower extremity  swelling: Patient reports he has had left lower extremity swelling for the past year.  He describes a prior ultrasound evaluation through [facility].  I see arterial Doppler previously performed in 2022 through his podiatry team as well as a right lower extremity venous ultrasound in 2019  -Left lower extremity venous duplex ultrasound done showed left lower extremity DVT  Started on low intensity heparin drip.  Will plan on switching him to oral Eliquis in the next 24 hours     Uncontrolled type 2 diabetes: History of diabetic foot ulcers, none currently present.  Nonadherent with metformin, utilizing a 500 mg dose every 3 days or so by his recollection.  Hemoglobin A1c of 12.8.  -Atorvastatin 40 mg daily initiating  -Aspirin and Plavix as above with plan for long-term aspirin  -Continue metformin 500 mg at bedtime; he reports some GI symptoms with his intermittent metformin use.  Encouraged him to maintain more consistent use of metformin to attempt to uptitrate, though if symptoms persist beyond 7 to 10 days, may consider discontinuing    -Hemohypoglycemia protocol in place  -When patient is cleared for oral intake by speech pathology, recommend initiating oral hypoglycemic agent to reduce anticipated insulin need and associated costs.  Increased  Lantus to 15 units subcu daily.  Blood sugars are well-controlled currently     Charcot joint of left lower extremity:  -CROW boot has been recommended by podiatry during ambulation.  Can be utilized if patient is able to have this brought from home  -Fall precautions                   Diet: Combination Diet Moderate Consistent Carb (60 g CHO per Meal) Diet; Easy to Chew (level 7); Thin Liquids (level 0) (No straws, upright, small bites/sips and alternate liquids solids.)  Room Service    DVT Prophylaxis: Heparin drip  Levine Catheter: Not present  Lines: None     Cardiac Monitoring: ACTIVE order. Indication: Stroke, acute (48 hours)  Code Status: Full Code      Clinically  Significant Risk Factors                           # DMII: A1C = 12.8 % (Ref range: <5.7 %) within past 6 months, PRESENT ON ADMISSION      # Financial/Environmental Concerns: none (denies)         Disposition Plan     Medically Ready for Discharge: Anticipated in 2-4 Days once heparin drip switched to oral Eliquis.  PT recommended home with assist and outpatient PT on discharge    Discussed with bedside RN, patient and his significant other at bedside and care coordinator             Lashonda Medina MD  Hospitalist Service  [facility]  Securely message with [employer] (more info)  Text page via [employer] Paging/Directory   ______________________________________________________________________    Interval History   Chart reviewed.  Patient is resting comfortably in bed.  Left-sided weakness is slightly better.  Discussed with him and significant other about the cost of the Eliquis and possibly getting some discount while contacting company as per the pharmacy liasion consult.  CT head to be repeated today per neurology if normal will increase heparin drip to high intensity.  Will plan on switching him to oral Eliquis in the next 24 hours if he remains stable.  PT is recommending discharge to home with assistance and. outpatient PT. patient lives with his sister who could give assist him as needed.  No other acute issues    Physical Exam   Vital Signs: Temp: 97.9  F (36.6  C) Temp src: Oral BP: 129/72 Pulse: 62   Resp: 16 SpO2: 97 % O2 Device: None (Room air)    Weight: 162 lbs 14.4 oz    General Appearance: Alert awake, not in acute distress  Respiratory: Clear to auscultation bilaterally, no crackles or wheezing heard  Cardiovascular: Normal rate rhythm regular  GI: Soft, nontender nondistended bowel sounds positive  Skin: No rashes or lesions noted  Other: Mild left-sided weakness with 4 x 5 strength, right side 5 x 5 strength noted.  Mild left-sided facial droop    Medical Decision Making       50  MINUTES SPENT BY ME on the date of service doing chart review, history, exam, documentation & further activities per the note.      Data     I have personally reviewed the following data over the past 24 hrs:    6.5  \   12.7 (L)   / 196     137 104 15.0 /  155 (H)   4.0 23 0.94 \       Imaging results reviewed over the past 24 hrs:   Recent Results (from the past 24 hour(s))   Echocardiogram Complete - For age > 60 yrs   Result Value    LVEF  60%    Narrative    [id number]  [id number]  [id number]  [id number]^MAGDY^DULCE     [facility]  Echocardiography Laboratory  [address] [place] [id number]     Name: PREETI PUCKETT  MRN: [id number]  : 1955  Study Date: 2024 02:17 PM  Age: 69 yrs  Gender: Male  Patient Location: [facility]  Reason For Study: Cerebrovascular Incident  Ordering Physician: DULCE CURRAN  Performed By: Samara Eldridge     BSA: 1.9 m2  Height: 68 in  Weight: 162 lb  HR: 59  BP: 181/99 mmHg  ______________________________________________________________________________  Procedure  Complete Portable Bubble Echo Adult. Optison (NDC #7711-2921) given  intravenously.  ______________________________________________________________________________  Interpretation Summary     1. The left ventricle is normal in structure, function and size. The visual  ejection fraction is estimated at 60%.  2. The right ventricle is normal in structure, function and size.  3. There is no atrial shunt seen. A contrast injection (Bubble Study) was  performed that was negative for flow across the interatrial septum.  4. No valve disease.     No previous echo for comparison.  ______________________________________________________________________________  Left Ventricle  The left ventricle is normal in structure, function and size. There is normal  left ventricular wall thickness. The visual ejection fraction is estimated at  60%. Left ventricular diastolic function is normal. Normal left ventricular  wall  motion.     Right Ventricle  The right ventricle is normal in structure, function and size.     Atria  Normal left atrial size. Right atrial size is normal. There is no atrial shunt  seen. A contrast injection (Bubble Study) was performed that was negative for  flow across the interatrial septum.     Mitral Valve  The mitral valve is normal in structure and function.     Tricuspid Valve  No tricuspid regurgitation.     Aortic Valve  The aortic valve is normal in structure and function.     Pulmonic Valve  The pulmonic valve is normal in structure and function.     Vessels  Normal ascending, transverse (arch), and descending aorta. The inferior vena  cava was normal in size with preserved respiratory variability.     Pericardium  There is no pericardial effusion.     Rhythm  Sinus rhythm was noted.  ______________________________________________________________________________  MMode/2D Measurements & Calculations     IVSd: 0.85 cm  LVIDd: 4.7 cm  LVIDs: 3.3 cm  LVPWd: 0.88 cm  FS: 30.2 %  LV mass(C)d: 135.7 grams  LV mass(C)dI: 72.6 grams/m2  Ao root diam: 3.2 cm  LA dimension: 3.3 cm  asc Aorta Diam: 3.2 cm  LA/Ao: 1.0  LVOT diam: 2.0 cm  LVOT area: 3.3 cm2  Ao root diam index Ht(cm/m): 1.9  Ao root diam index BSA (cm/m2): 1.7  Asc Ao diam index BSA (cm/m2): 1.7  Asc Ao diam index Ht(cm/m): 1.9  LA Volume (BP): 56.9 ml     LA Volume Index (BP): 30.4 ml/m2  RV Base: 2.6 cm  RWT: 0.37  TAPSE: 1.8 cm     Doppler Measurements & Calculations  MV E max earl: 72.4 cm/sec  MV A max earl: 68.6 cm/sec  MV E/A: 1.1  MV dec time: 0.21 sec  PA acc time: 0.10 sec  E/E' avg: 10.3  Lateral E/e': 6.8  Medial E/e': 13.9  RV S Earl: 10.4 cm/sec     ______________________________________________________________________________  Report approved by: Anil Mendiola 09/24/2024 03:41 PM

## 2024-09-25 NOTE — PLAN OF CARE
Reason for Admission: R acute infarct, and LLE DVT, Fall     Cognitive/Mentation: A/Ox 4  Neuros/CMS: Stable w/ L facial droop and generalized weakness  VS: VSS on RA.   Tele: SR.  GI/: Incontinent of bladder at times. No BM this shift  Pulmonary: LS clear.  Pain: Denies pain.     Drains/Lines: L PIV infusing heparin gtt at 650 units/hr. Next Xa at 0627  Skin: Left hip abrasion/scab, scattered scabs, cracked foot, LLE edema   Activity: Assist x 1 with walker and gait belt.  Diet: Mod carb, easy to chew with thin liquids. Takes pills whole with water.     Therapies recs: Pending  Discharge: Pending    Aggression Stoplight Tool: Green    End of shift summary: Rested between cares. No issue overnight. Continue with plan of care

## 2024-09-25 NOTE — PROGRESS NOTES
09/24/24 1800   Appointment Info   Signing Clinician's Name / Credentials (PT) Daniel Jimenez DPT   Living Environment   People in Home sibling(s)  (brother and sister)   Current Living Arrangements house   Home Accessibility stairs to enter home   Number of Stairs, Main Entrance 3   Stair Railings, Main Entrance railing on left side (ascending)   Transportation Anticipated family or friend will provide   Living Environment Comments Pt lives in a rambler with his siblings, he has a girlfriend who is over frequently as well. Pt states someone is there all the time if he needs assist. 3 VALERIE with all needs met on main level   Self-Care   Usual Activity Tolerance good   Current Activity Tolerance moderate   Equipment Currently Used at Home none   Fall history within last six months yes   Number of times patient has fallen within last six months 3   Activity/Exercise/Self-Care Comment At baseline pt IND with mobility, says his girlriend got him a cane but he really doesn't use it, IND with ADL's, has RTS and walker at home   General Information   Onset of Illness/Injury or Date of Surgery 09/24/24   Referring Physician [place], Juan Trevino MD   Patient/Family Therapy Goals Statement (PT) go back home   Pertinent History of Current Problem (include personal factors and/or comorbidities that impact the POC) Francisco J Felipe is a 69 year old male admitted on 9/23/2024. He presents to the emergency department for evaluation of left-sided facial droop and unsteady gait over the past 3 days.  Presentation and findings concerning for right MCA versus PCA stroke   Existing Precautions/Restrictions fall   Weight-Bearing Status - LLE full weight-bearing   Weight-Bearing Status - RLE full weight-bearing   Cognition   Affect/Mental Status (Cognition) WNL   Orientation Status (Cognition) oriented x 4   Follows Commands (Cognition) WNL   Pain Assessment   Patient Currently in Pain No   Integumentary/Edema   Integumentary/Edema no deficits  were identifed   Posture    Posture Not impaired   Range of Motion (ROM)   Range of Motion ROM is WFL   Strength (Manual Muscle Testing)   Strength (Manual Muscle Testing) strength is WFL   Bed Mobility   Comment, (Bed Mobility) SBA   Transfers   Comment, (Transfers) SBA with FWW   Gait/Stairs (Locomotion)   [place] Level (Gait) contact guard   Assistive Device (Gait) walker, front-wheeled   Distance in Feet (Gait) 10'   Pattern (Gait) step-through   Balance   Balance Comments standing balance adequate, deficits with dynamic balance, able to walk short distance without walker with mild instability, improved using FWW at this time   Sensory Examination   Sensory Perception patient reports no sensory changes   Sensory Perception Comments light touch intact orlando MONACO's   Clinical Impression   Criteria for Skilled Therapeutic Intervention Yes, treatment indicated   PT Diagnosis (PT) impaired gait   Influenced by the following impairments deficits with functional balance   Functional limitations due to impairments decreased ind with ambulation, steps, falls risk   Clinical Presentation (PT Evaluation Complexity) stable   Clinical Presentation Rationale clinical judgement   Clinical Decision Making (Complexity) low complexity   Planned Therapy Interventions (PT) balance training;bed mobility training;cryotherapy;gait training;home exercise program;patient/family education;ROM (range of motion);stair training;strengthening;stretching;transfer training;progressive activity/exercise   Risk & Benefits of therapy have been explained evaluation/treatment results reviewed;care plan/treatment goals reviewed;current/potential barriers reviewed;risks/benefits reviewed;participants voiced agreement with care plan;participants included;patient   PT Total Evaluation Time   PT Eval, Low Complexity Minutes (88073) 10   Physical Therapy Goals   PT Frequency Daily   PT Predicted Duration/Target Date for Goal Attainment 09/29/24   PT  Goals Bed Mobility;Transfers;Gait;Stairs   PT: Bed Mobility Independent;Supine to/from sit   PT: Transfers Modified independent;Sit to/from stand;Assistive device   PT: Gait Modified independent;Assistive device;Greater than 200 feet   PT: Stairs Modified independent;3 stairs;Rail on left   Interventions   Interventions Quick Adds Gait Training;Therapeutic Activity;Therapeutic Procedure   Therapeutic Procedure/Exercise   Ther. Procedure: strength, endurance, ROM, flexibillity Minutes (54958) 1   Symptoms Noted During/After Treatment none   Treatment Detail/Skilled Intervention Pt educated on benefits of AP's on circulation and cued to perform x 30, and to perform throughout the day   Therapeutic Activity   Therapeutic Activities: dynamic activities to improve functional performance Minutes (27204) 10   Symptoms Noted During/After Treatment None   Treatment Detail/Skilled Intervention Pt greeted supine in bed, agreeable to PT. Eval completed. Educated on benefits of OOB mobility in hospital and pt voiced understanding and agreement. Pt SBA with bed mobility performing well. Sitting EOB pt /101, HR mid 80's sating 96% on RA. STS x 3 from bed with FWW CGA progressing to SBA, cues for hand placement and walker placement. After walking hallway, pt amb to BR using IV pole SBA, stood over toilet to void with SBA, appeared to have good static standing balance. After activity, with pt supine /91, HR 88, sating 97% End of session pt supine in bed with alarm on call light in reach.   Gait Training   Gait Training Minutes (11210) 8   Symptoms Noted During/After Treatment (Gait Training) fatigue   Treatment Detail/Skilled Intervention Pt amb ~100' with FWW and CGA progressing to close SBA. Slow gait speed, step-through pattern, cues for safe walker placement. Pt appeareing steady with walker, tried walking with IV pole rest of way ~100', slightly more instability but no LOB.   Distance in Feet 200'   PT Discharge  Planning   PT Plan balance assessment, progress gait with AD vs. no AD, dynamic balance   PT Discharge Recommendation (DC Rec) home with assist;home with outpatient physical therapy   PT Rationale for DC Rec At baseline pt is IND with mobility and ADL's. Currenlty SBA using walker/IV pole, deficits with balance, activity tolerance. Pt lives with siblings in a house and someone is there at all times. Anticipate with further medical managmeent pt will be able to discharge home with assist as needed, pt has AD's to use at home and would recommend pt doing so now. Pt would benefit from OP PT to address any balance deficits and reduce falls risk   PT Brief overview of current status SBA with FWW - Goals of therapy will be to address safe mobility and make recs for d/c to next level of care. Pt and RN will continue to follow all falls risk precautions as documented by RN staff while hospitalized   Total Session Time   Timed Code Treatment Minutes 19   Total Session Time (sum of timed and untimed services) 29

## 2024-09-26 ENCOUNTER — APPOINTMENT (OUTPATIENT)
Dept: SPEECH THERAPY | Facility: CLINIC | Age: 69
DRG: 065 | End: 2024-09-26
Payer: MEDICARE

## 2024-09-26 ENCOUNTER — ORDERS ONLY (AUTO-RELEASED) (OUTPATIENT)
Dept: MEDSURG UNIT | Facility: CLINIC | Age: 69
End: 2024-09-26

## 2024-09-26 ENCOUNTER — APPOINTMENT (OUTPATIENT)
Dept: PHYSICAL THERAPY | Facility: CLINIC | Age: 69
DRG: 065 | End: 2024-09-26
Payer: MEDICARE

## 2024-09-26 ENCOUNTER — ALLIED HEALTH/NURSE VISIT (OUTPATIENT)
Dept: NEUROLOGY | Facility: CLINIC | Age: 69
End: 2024-09-26

## 2024-09-26 ENCOUNTER — APPOINTMENT (OUTPATIENT)
Dept: OCCUPATIONAL THERAPY | Facility: CLINIC | Age: 69
DRG: 065 | End: 2024-09-26
Payer: MEDICARE

## 2024-09-26 DIAGNOSIS — I63.50 POSTERIOR CIRCULATION STROKE (H): ICD-10-CM

## 2024-09-26 DIAGNOSIS — Z00.6 EXAMINATION OF PARTICIPANT OR CONTROL IN CLINICAL RESEARCH: Primary | ICD-10-CM

## 2024-09-26 LAB
ANION GAP SERPL CALCULATED.3IONS-SCNC: 13 MMOL/L (ref 7–15)
ATRIAL RATE - MUSE: 61 BPM
BUN SERPL-MCNC: 16.4 MG/DL (ref 8–23)
CALCIUM SERPL-MCNC: 9.3 MG/DL (ref 8.8–10.4)
CHLORIDE SERPL-SCNC: 104 MMOL/L (ref 98–107)
CREAT SERPL-MCNC: 0.88 MG/DL (ref 0.67–1.17)
DIASTOLIC BLOOD PRESSURE - MUSE: NORMAL MMHG
EGFRCR SERPLBLD CKD-EPI 2021: >90 ML/MIN/1.73M2
ERYTHROCYTE [DISTWIDTH] IN BLOOD BY AUTOMATED COUNT: 11.8 % (ref 10–15)
GLUCOSE BLDC GLUCOMTR-MCNC: 139 MG/DL (ref 70–99)
GLUCOSE BLDC GLUCOMTR-MCNC: 152 MG/DL (ref 70–99)
GLUCOSE BLDC GLUCOMTR-MCNC: 177 MG/DL (ref 70–99)
GLUCOSE BLDC GLUCOMTR-MCNC: 91 MG/DL (ref 70–99)
GLUCOSE BLDC GLUCOMTR-MCNC: 93 MG/DL (ref 70–99)
GLUCOSE BLDC GLUCOMTR-MCNC: 95 MG/DL (ref 70–99)
GLUCOSE SERPL-MCNC: 97 MG/DL (ref 70–99)
HCO3 SERPL-SCNC: 21 MMOL/L (ref 22–29)
HCT VFR BLD AUTO: 36.6 % (ref 40–53)
HGB BLD-MCNC: 12.5 G/DL (ref 13.3–17.7)
INTERPRETATION ECG - MUSE: NORMAL
MCH RBC QN AUTO: 31.1 PG (ref 26.5–33)
MCHC RBC AUTO-ENTMCNC: 34.2 G/DL (ref 31.5–36.5)
MCV RBC AUTO: 91 FL (ref 78–100)
P AXIS - MUSE: 58 DEGREES
PLATELET # BLD AUTO: 212 10E3/UL (ref 150–450)
POTASSIUM SERPL-SCNC: 4.1 MMOL/L (ref 3.4–5.3)
PR INTERVAL - MUSE: 168 MS
QRS DURATION - MUSE: 78 MS
QT - MUSE: 460 MS
QTC - MUSE: 463 MS
R AXIS - MUSE: 19 DEGREES
RBC # BLD AUTO: 4.02 10E6/UL (ref 4.4–5.9)
SODIUM SERPL-SCNC: 138 MMOL/L (ref 135–145)
SYSTOLIC BLOOD PRESSURE - MUSE: NORMAL MMHG
T AXIS - MUSE: 84 DEGREES
VENTRICULAR RATE- MUSE: 61 BPM
WBC # BLD AUTO: 7.2 10E3/UL (ref 4–11)

## 2024-09-26 PROCEDURE — 97530 THERAPEUTIC ACTIVITIES: CPT | Mod: GP

## 2024-09-26 PROCEDURE — 120N000001 HC R&B MED SURG/OB

## 2024-09-26 PROCEDURE — 250N000013 HC RX MED GY IP 250 OP 250 PS 637: Performed by: INTERNAL MEDICINE

## 2024-09-26 PROCEDURE — 92526 ORAL FUNCTION THERAPY: CPT | Mod: GN | Performed by: SPEECH-LANGUAGE PATHOLOGIST

## 2024-09-26 PROCEDURE — 36415 COLL VENOUS BLD VENIPUNCTURE: CPT | Performed by: INTERNAL MEDICINE

## 2024-09-26 PROCEDURE — 99232 SBSQ HOSP IP/OBS MODERATE 35: CPT | Performed by: INTERNAL MEDICINE

## 2024-09-26 PROCEDURE — 97750 PHYSICAL PERFORMANCE TEST: CPT | Mod: GP

## 2024-09-26 PROCEDURE — 85027 COMPLETE CBC AUTOMATED: CPT | Performed by: INTERNAL MEDICINE

## 2024-09-26 PROCEDURE — 97535 SELF CARE MNGMENT TRAINING: CPT | Mod: GO

## 2024-09-26 PROCEDURE — 80048 BASIC METABOLIC PNL TOTAL CA: CPT | Performed by: INTERNAL MEDICINE

## 2024-09-26 RX ORDER — ATORVASTATIN CALCIUM 80 MG/1
80 TABLET, FILM COATED ORAL EVERY EVENING
Qty: 90 TABLET | Refills: 0 | Status: ON HOLD | DISCHARGE
Start: 2024-09-26 | End: 2024-10-02

## 2024-09-26 RX ORDER — POLYETHYLENE GLYCOL 3350 17 G/17G
17 POWDER, FOR SOLUTION ORAL DAILY
Status: ON HOLD | DISCHARGE
Start: 2024-09-26 | End: 2024-10-02

## 2024-09-26 RX ORDER — POLYETHYLENE GLYCOL 3350 17 G/17G
17 POWDER, FOR SOLUTION ORAL DAILY
Status: DISCONTINUED | OUTPATIENT
Start: 2024-09-26 | End: 2024-09-27 | Stop reason: HOSPADM

## 2024-09-26 RX ADMIN — ATORVASTATIN CALCIUM 80 MG: 80 TABLET, FILM COATED ORAL at 20:22

## 2024-09-26 RX ADMIN — APIXABAN 10 MG: 5 TABLET, FILM COATED ORAL at 20:22

## 2024-09-26 RX ADMIN — METFORMIN HYDROCHLORIDE 500 MG: 500 TABLET ORAL at 17:37

## 2024-09-26 RX ADMIN — APIXABAN 10 MG: 5 TABLET, FILM COATED ORAL at 08:42

## 2024-09-26 RX ADMIN — POLYETHYLENE GLYCOL 3350 17 G: 17 POWDER, FOR SOLUTION ORAL at 10:58

## 2024-09-26 ASSESSMENT — ACTIVITIES OF DAILY LIVING (ADL)
ADLS_ACUITY_SCORE: 32
ADLS_ACUITY_SCORE: 34
ADLS_ACUITY_SCORE: 34
ADLS_ACUITY_SCORE: 35
ADLS_ACUITY_SCORE: 34
ADLS_ACUITY_SCORE: 33
ADLS_ACUITY_SCORE: 34
ADLS_ACUITY_SCORE: 33
ADLS_ACUITY_SCORE: 34
ADLS_ACUITY_SCORE: 33
ADLS_ACUITY_SCORE: 34
ADLS_ACUITY_SCORE: 33
ADLS_ACUITY_SCORE: 32
ADLS_ACUITY_SCORE: 33
ADLS_ACUITY_SCORE: 32

## 2024-09-26 NOTE — PLAN OF CARE
Pt here with R CVA, LLE DVT. A&Ox4, forgetful at times. Neuros L droop. VSS, <220. Tele SR w/PVC. Easy to chew diet, thin liquids. Takes pills whole. Up with assist x1, GB and walker. Denies pain. LLE with +2-3 edema. BM x1. Pt scoring green on the Aggression Stop Light Tool. Plan to continue therapies. Discharge pending, therapies recommending ARU.

## 2024-09-26 NOTE — PROGRESS NOTES
Trial: Determinants of Incident Stroke Cognitive Outcomes and Vascular Effects on RecoverY (DISCOVERY trial)     Participant or LAR/Surrogate Prospective Consent     IRB number: [id number]     PI: Gonzalez May MD PhD MS      Estimated duration of study: 4 years     On 9/25/24 the patient was screened and consented for the DISCOVERY study by the note author. Permission to approach the patient was granted by Vonnie Jarrett MD. The current IRB approved consent form was reviewed and discussed at length with the patient. This included purpose, research hypothesis, nature of the research, study contacts, risks & benefits, procedures required for screening and enrollment, as well as all required follow up. Confidentiality issues, compensation for injury, and alternative treatments were explained. Patient was informed that participation is voluntary and that refusal to participate will involve no penalty or decrease benefits to which the subject is otherwise entitled. patient had the opportunity to read the entire consent, ask questions, express concerns and was offered sufficient time to consider the research trial. patient was able to clearly state what study participation involved and the associated requirements. All questions and concerns were addressed. The patient signed the consent form, and a copy of the signed consent form was given to patient. No study procedures were conducted before the consent form was signed.     In the opinion of the investigator and prior to consent, inclusion/exclusion criteria were reviewed and met.  [x] YES  [] NO        PAM Pandey DISCOVERY Research Coordinator [email or web address]

## 2024-09-26 NOTE — INTERIM SUMMARY
[facility] Pre-Admission Screen    Referral Source:  [facility] NEUROSCIENCE UNIT  CARDIAC SERVICES  Admit date to referring facility: 9/23/2024    Physical Medicine and Rehab Consult Completed: No    Rehab Diagnosis:    Stroke Ischemic 01.1 (L) Body Involvement (R) Brain; Acute ischemic stroke of right pontine area due to small-vessel occlusion and right frontal area due to ESUS     Justification for Acute Inpatient Rehabilitation  Francisco J Felipe is a 69 year old male admitted on 9/23/2024. He presents to the emergency department for evaluation of left-sided facial droop and unsteady gait over the past 3 days. 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 Left lower extremity ultrasound noted to have DVT in in Left common femoral, profunda femoral, femoral, popliteal, and peroneal veins. Started on IV heparin drip per neurology recommendation on 9/24/2024.  Pt switched to oral Eliquis. 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 Hospital course was complicated by vasovagal syncope suspected due to constipation; pt was started on miralax and lactulose. He is now stable to transfer to inpatient rehab with continued medical management of neuro status, DVT/anticoagulation, uncontrolled diabetes, bowel needs and monitor for further syncopal events.     At baseline, pt lives in a rambler with his siblings, and was independent with ADL, IADL and mobility without use of AD. Currently, pt requires assist of 1 and a walker with ADL and mobility due to L hemiplegia with impaired balance, coordination and weight shifting. He scored 10/24 on DGI indicating he is at high risk for falls and has moderate impairment with household mobility  tasks. He needs Min-Mod assist of 1 for all ADL. He is also dysarthric with a facial droop and has mild dysphagia, warranting a a soft and easy to chew diet with SLP intervention during mealtimes. Intensive PT, OT, and SLP not available in a lower level of care is required to maximize safety and independence to allow pt to return home and to the community.      Current Active Medical Management Needs/Risks for Clinical Complications  The patient requires the high level of rehabilitation physician supervision that accompanies the provision of intensive rehabilitation therapy.  The patient needs the services of the rehabilitation physician at least 3 times per week to assess the patient medically and functionally and to modify the course of treatment as needed to maximize the patient's capacity to benefit from the rehabilitation process.    Neuro: Pt is at risk for neurologic decline, falls with injury, post stroke depression and pain and secondary stroke. Continue neuro checks and assess for neurologic recovery. Provide stroke education, including lifestyle and proper medication management. Continue atorvostatin, ASA and plavix. Permissive BP was allowed while inpt and pt was not on BP medications. Monitor BP; Losartan started 9/27 and may need titration.   Cardiac: Pt is at risk for arrhythmias and cardiovascular events. Monitor activity tolerance. 14 day Ziopatch ordered.   Uncontrolled Type II diabetes mellitus: Pt is at risk of uncontrolled BG, impaired healing with history of diabetic foot ulcers. Continue bedside BG checks and diabetic education (pt has had some non-compliance with DM routine) and monitor skin. Hemoglobin A1c of 12.8. Continue metformin 500 mg at bedtime; he reports some GI symptoms with his intermittent metformin use. Encouraged him to maintain more consistent use of metformin to attempt to uptitrate, though if symptoms persist beyond 7 to 10 days, may consider discontinuing. Increased Lantus  to 15 units subcu daily.    DVT: Pt is at risk for continued blood clots as well as cardiovascular events. Started on Eliquis. Continue to monitor for new symptoms and resolution of DVT.   Bowel: Pt is at risk for constipation and vasovagal event again. Continue bowel regimen and track BM frequency.       Past Medical/Surgical History   Surgery in the past 100 days: No   Additional relevant past medical history: L charcot foot-has been recommended to wear a brace/boot, HTN, diabetic foot ulcers    Level of Functioning Prior to Admission:    LIVING ENVIRONMENT  People in Home: sibling(s), significant other  Current Living Arrangements: house  Home Accessibility: stairs to enter home  Number of Stairs, Main Entrance: 3  Stair Railings, Main Entrance: railing on left side (ascending)  Transportation Anticipated: family or friend will provide  Living Environment Comments: Pt lives in a rambler with his siblings, he has a girlfriend who is over frequently as well. Pt states someone is there all the time if he needs assist. 3 VALERIE with all needs met on main level    SELF-CARE  Usual Activity Tolerance: good  Equipment Currently Used at Home: none  Activity/Exercise/Self-Care Comment: Pt reports typically indep at baseline with functional mobility and self cares. Retired since DM2 got worse per pt report.      Level of Function: GG Scale (Section GG Functional Ability and Goals; CMS's LAIRD Version 3.0 Manual effective 10.1.2019):  PT Current Function Goals for Rehab   Bed Rolling 4 Supervision or touching assistance 6 Independent   Supine to Sit 3 Partial/moderate assistance 6 Independent   Sit to Stand 3 Partial/moderate assistance 6 Independent   Transfer 3 Partial/moderate assistance 6 Independent   Ambulation 4 Supervision or touching assistance (CGA with WW) 6 Independent   Stairs 88 Not attempted due to safety 6 Independent     OT Current Function Goals for Rehab   Feeding 4 Supervision or touching assistance 6  Independent   Grooming 3 Partial/moderate assistance 6 Independent   Bathing Not completed 6 Independent   Upper Body Dressing Not completed 6 Independent   Lower Body Dressing 3 Partial/moderate assistance 6 Independent   Toileting 3 Partial/moderate assistance 6 Independent   Toilet Transfer 3 Partial/moderate assistance 6 Independent   Tub/Shower Transfer 88 Not attempted due to safety 6 Independent   Cognition Not Assessed Independent     SLP Current Function Goals for Rehab   Swallow Impaired Tolerate least restrictive diet without signs & symptoms of aspiration and adhere to safe swallow strategies   Communication Impaired Communicate basic needs effectively       Current Diet:  0-Thin and 7-Easy to chew    Summary Statement:  Pt is a 69 year old who is typically independent with all ADL, IADL and mobility who is now presenting with functional deficits in all areas due to acute stroke with L hemiplegia. He now requires assist of 1 for all ADL and mobility, has dysarthric speech and is on an easy to chew diet. His balance and coordination are very impaired, placing at him high risk for falls. He is appropriate for intensive PT, OT and SLP intervention to maximize independence and safety for discharge home. He will also need continued medical management of his neuro status, bowels, DVT newly on AC, uncontrolled diabetes. Anticipate that pt will be able to discharge home in 8 days with OP therapies and assist from family and significant other as needed.     Expected Therapies and Services Required During Inpatient Rehab Admission  Intensity of Therapy: Patient requires intensive therapies not available in a lesser level of care. Patient is motivated, making gains, and can tolerate 3 hours of therapy a day.  Physical Therapy: 60 minutes per day, 6 days a week for 8 days  Occupational Therapy: 60 minutes per day, 6 days a week for 8 days  Speech and Language Therapy: 60 minutes per day, 6 days a week for 8  days  Rehabilitation Nursing Needs: Patient requires 24 hour Rehab Nursing to manage bowel program, vitals, medication education, positioning, carryover of new rehab techniques, care coordination, blood sugar management, diabetes education, assess neurologic status, stroke education, provide safe environment for patient at falls risk, and monitor nutritional intake.    Precautions/restrictions/special needs:   Precautions: fall precautions, seizure precautions   Restrictions: NA   Special Needs:  NA    Expected Level of Improvement: Mod (I) with ADL and mobility around the home, SBA with community mobility and IADL  Expected Length of time to achieve: 8 days    Anticipated Discharge Needs:  Anticipated Discharge Destination: Home  Anticipated Discharge Support: Family member  24/7 support available : Yes  Identified caregiver(s):  Siblings, significant other  Anticipated Discharge Needs: Home with outpatient therapy    Identified challenges/barriers:  None identified    Liaison signature/date/time: Maryann Little CM 9/26/2024 2:56 PM    Physician statement of review and agreement:  I have reviewed and am in agreement of the need for IRF stay to address above functional and medical needs. In addition to above statements address, Patient requires intensive active and ongoing therapeutic intervention and multiple therapies; Patient requires medical supervision; Expected to actively participate in the intensive rehab program; Sufficiently stable to actively participate; Expectation for measurable improvement in functional capacity or adaption to impairments.    MD signature/date/time:

## 2024-09-26 NOTE — PLAN OF CARE
[id number]  Reason for Admission: R shalom CVA    Cognitive/Mentation: A/Ox 4, forgetful  Neuros/CMS: Intact ex L droop, generalized weakness  VS: stable on RA.   Tele: NSR.  /GI: In/Continent. Pt states 2-3 days ago.   Pulmonary: LS clear.  Pain: denies.     Drains/Lines: PIV SL  Skin: Dry/cracked feet, scattered bruising  Activity: Assist x 1 with GB/W.  Diet: Mod CHO/EZ to chew with thin liquids. Takes pills whole.     Therapies recs: Home w OP therapy   Discharge: pending    Aggression Stoplight Tool: green    End of shift summary: 1x dose of lactulose given, able to have small BM but still feels need to defecate. No further vasovagal like episodes. Nausea improving.

## 2024-09-26 NOTE — PLAN OF CARE
Reason for Admission: R pontine CVA    Hx: DM-2, Recurrent falls, HTN    Cognitive/Mentation: A/Ox 4  Neuros/CMS: Intact ex L droop, mild dysarthria, LLE DVT,   VS: VSS on RA. SBP <220  Tele: NSR w/ PVCs.  /GI: Continent. Last BM 9/25/2024.   Pulmonary: LS clear.  Pain: denies.     Drains/Lines: R PIV SL  Skin: Scabs on knee, L hip, scattered bruising  Activity: Assist x 1 with GBW.  Diet: ETC with thin liquids. Takes pills whole.     Therapies recs: ARU  Discharge: Pending    Aggression Stoplight Tool: Green    End of shift summary: on Eliquis. PCDs removed d/t DVT on LLE

## 2024-09-26 NOTE — PROGRESS NOTES
Rehab Admissions:  Met with pt regarding referral to [facility], and provided him with information including location, visiting hours, what to bring, ELOS. Pt reports familiar with that location and agreeable to inpt rehab at [facility]. He reports prior to this hospitalization he was independent with all ADL, IADL and mobility, and lives with his brother and sister.    Thank you for the referral, we will continue to follow this patient for post acute placement.     Determination of admission is based upon the patient's need for an intensive, interdisciplinary approach to rehabilitation, their ability to progress, their ability to tolerate intensive therapies, their need for daily physician supervision, their need for twenty four hour nursing assistance, and their ability and willingness to participate in such a program.    Maryann Little CM  Rehab Liaison/  [facility] and [facility]  9/26/2024    2:43 PM

## 2024-09-26 NOTE — PROGRESS NOTES
Care Management Follow Up    Length of Stay (days): 2    Expected Discharge Date: 09/27/2024     Concerns to be Addressed: discharge planning     Patient plan of care discussed at interdisciplinary rounds: Yes    Anticipated Discharge Disposition: ARU    Referrals Placed by CM/LYNSEY: post acute placement, Insurance Verification for medications  Private pay costs discussed: Not applicable    Discussed  Partnership in Safe Discharge Planning  document with patient/family: No     Handoff Completed: No, handoff not indicated or clinically appropriate    Additional Information:  Patient now has recommendations for ARU at discharge, as opposed to previous plan for home with outpatient therapies. [facility] and spoke to Jazmyn in admissions there. She said the patient appears appropriate and she will meet with him today to answer any questions that he may have.     ADD: 1505  [facility] has a bed for patient tomorrow and they are requesting a [id number] ride. LYNSEY met with patient to let him know there was a bed at this time and he said he is going to call his family now to  discuss transport. Patient's family will transport at 1100 tomorrow 9/27 to [facility]. Hospitalist notified of this plan.     Leila Kohli, RICO, LGSW   Social Work   [facility]

## 2024-09-26 NOTE — PROGRESS NOTES
[facility]    Medicine Progress Note - Hospitalist Service    Date of Admission:  9/23/2024    Assessment & Plan   Francisco J Felipe is a 69 year old male admitted on 9/23/2024. He presents to the emergency department for evaluation of left-sided facial droop and unsteady gait over the past 3 days.  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  Left lower extremity ultrasound noted to have DVT.  Started on low intensity IV heparin drip per neurology recommendation on 9/24/2024.  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  Improved 9/21, but has persisted leading to presentation in the emergency department tonight.  In the setting of hypertension, uncontrolled type 2 diabetes.  -MRI brain with and without contrast done showed small acute infarct right Robb shalom.  No hemorrhage or mass effect.  Age-related and chronic ischemic changes.  Abnormal left transverse and sigmoid sinus flow-voids corresponding to the abnormality on CTA  -Every 4 hours neurochecks and vital signs  -Stroke neurology consulted, aware of patient from emergency department  Patient was found to have left lower extremity DVT on 9/24/2024.  Started on low-dose heparin drip  CT head to be repeated on 9/25/2024 if looks okay will increase the heparin drip to high intensity  Will plan on switching him to oral Eliquis in the next 24 hours if he remains stable  Left transverse sigmoid and sigmoid sinus occlusion: Noted on CTA.  No headache, no seizure history.  -neurology consulted.  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  -Seizure precautions    Vasovagal syncope;  Patient  was to have a bowel movement and then he became bradycardic and sweaty with heart rate of 27 and then had a brief episode of unresponsiveness on 9/25/2024  Twelve-lead EKG was done and showed normal sinus rhythm with no acute changes  RRT was called and his was evaluated by house provider  Patient was also nauseated and vomited  Patient was constipated prior to this episode was started on lactulose.  MiraLAX 17 g daily added  Continue to monitor him on telemetry       Left lower extremity swelling: Patient reports he has had left lower extremity swelling for the past year.  He describes a prior ultrasound evaluation through [facility] [place].  I see arterial Doppler previously performed in 2022 through his podiatry team as well as a right lower extremity venous ultrasound in 2019  -Left lower extremity venous duplex ultrasound done showed left lower extremity DVT  Started on low intensity heparin drip.  Repeat CT head was stable  Switched him to oral Eliquis     Uncontrolled type 2 diabetes: History of diabetic foot ulcers, none currently present.  Nonadherent with metformin, utilizing a 500 mg dose every 3 days or so by his recollection.  Hemoglobin A1c of 12.8.  -Atorvastatin 40 mg daily initiating  -Aspirin and Plavix as above with plan for long-term aspirin  -Continue metformin 500 mg at bedtime; he reports some GI symptoms with his intermittent metformin use.  Encouraged him to maintain more consistent use of metformin to attempt to uptitrate, though if symptoms persist beyond 7 to 10 days, may consider discontinuing    -Hemohypoglycemia protocol in place  -When patient is cleared for oral intake by speech pathology, recommend initiating oral hypoglycemic agent to reduce anticipated insulin need and associated costs.  Increased  Lantus to 15 units subcu daily.  Blood sugars are well-controlled currently     Charcot joint of left lower extremity:  -CROW boot has been recommended by podiatry during ambulation.  Can be  utilized if patient is able to have this brought from home  -Fall precautions                   Diet: Combination Diet Moderate Consistent Carb (60 g CHO per Meal) Diet; Easy to Chew (level 7); Thin Liquids (level 0) (No straws, upright, small bites/sips and alternate liquids solids.)  Room Service    DVT Prophylaxis: Eliquis  Levine Catheter: Not present  Lines: None     Cardiac Monitoring: ACTIVE order. Indication: Stroke, acute (48 hours)  Code Status: Full Code      Clinically Significant Risk Factors                           # DMII: A1C = 12.8 % (Ref range: <5.7 %) within past 6 months, PRESENT ON ADMISSION        # Financial/Environmental Concerns: none (denies)         Disposition Plan     Medically Ready for Discharge: Tomorrow  Patient needs to discharge to ARU per  PT OT.  [facility]       Discussed with bedside RN patient, patient's family sister and brother at bedside    Lashonda Medina MD  Hospitalist Service  [facility]  Securely message with [employer] (more info)  Text page via [employer] Paging/Directory   ______________________________________________________________________    Interval History   Chart reviewed.  Patient is sitting comfortably in chair.  Denies any dizziness or chest pain or shortness of breath.  No nausea vomiting currently.  Patient had an episode of brief unresponsiveness after transferring to have a bowel movement.  Heart rate was low at 27 on 9/25/2024.  RRT was called.  Felt the episode was secondary to vasovagal.  EKG was done and showed normal sinus rhythm with no acute changes.  PT OT is recommending discharge to ARU.  Blood sugars are well-controlled currently.  No other acute issues    Physical Exam   Vital Signs: Temp: 98.1  F (36.7  C) Temp src: Oral BP: 119/76 Pulse: 97   Resp: 16 SpO2: 94 % O2 Device: None (Room air)    Weight: 162 lbs 11.2 oz    General Appearance: Alert awake, not in acute distress  Respiratory: Clear  to auscultation bilaterally, no crackles or wheezing heard  Cardiovascular: Normal rate rhythm regular  GI: Soft, nontender nondistended bowel sounds positive  Skin: No rashes or lesions noted  Other: Mild left-sided weakness with 4 x 5 strength, right side 5 x 5 strength noted.  Mild left-sided facial droop    Medical Decision Making       50 MINUTES SPENT BY ME on the date of service doing chart review, history, exam, documentation & further activities per the note.      Data     I have personally reviewed the following data over the past 24 hrs:    7.2  \   12.5 (L)   / 212     138 104 16.4 /  95   4.1 21 (L) 0.88 \       Imaging results reviewed over the past 24 hrs:   Recent Results (from the past 24 hour(s))   CT Head w/o Contrast    Narrative    CT SCAN OF THE HEAD WITHOUT CONTRAST   9/25/2024 11:47 AM     HISTORY: Stroke.    TECHNIQUE:  Axial images of the head and coronal reformations without  IV contrast material. Radiation dose for this scan was reduced using  automated exposure control, adjustment of the mA and/or kV according  to patient size, or iterative reconstruction technique.    COMPARISON: MRI of the brain dated 9/24/2024. CT of the head  9/23/2024.    FINDINGS: Small area of asymmetric hypoattenuation involving the right  anterior paramedian aspect of the shalom (series 2 image 7), likely  representing the recent ischemic infarct seen on the MRI of 9/24/2024.  In retrospect, this also appears to have been present at least to some  degree on the head CT of 9/23/2024, although this area is partially  obscured by artifacts. No definite finding to suggest acute  intracranial hemorrhage or significant mass effect.    There is unchanged mild prominence of the size of the bilateral  lateral ventricles and the third ventricle, which may be due to ex  vacuo dilatation. There is mild generalized brain parenchymal volume  loss. Mild patchy nonspecific hypoattenuation in the cerebral white  matter, which may  be due to chronic small vessel ischemic changes.    Visualized aspects of paranasal sinuses and mastoids are clear. The  calvarium appears intact.      Impression    IMPRESSION:  1. Findings in keeping with a subtle small recent ischemic infarct in  the right ventral shalom, much better characterized on the prior MRI  exam. No acute intracranial hemorrhage or significant mass effect.  2. Brain atrophy and presumed chronic small vessel ischemic changes,  as described.    FORREST MAR MD         SYSTEM ID:  [id number]   XR Abdomen Port 1 View    Narrative    EXAM: XR ABDOMEN PORT 1 VIEW  LOCATION: [facility]  DATE: 9/25/2024    INDICATION: emesis  COMPARISON: None.      Impression    IMPRESSION: Normal bowel gas pattern. Advanced vascular calcification.

## 2024-09-26 NOTE — PROGRESS NOTES
09/26/24 1219   Signing Clinician's Name / Credentials   Signing clinician's name / credentials Yaneth Washington DPT   Dynamic Gait Index (Freeman and [place], 1995)   Gait Level Surface 2   Change in Gait Speed 2   Gait and Horizontal Head Turns 1   Gait with Vertical Head Turns 1   Gait and Pivot Turns 1   Step Over Obstacle 1   Step Around Obstacles 2   Steps 0   Total Dynamic Gait Index Score  (A score of 19 or less has been correlated to an increased risk of falls in community dwelling older adults, patients with vestibular disorders, and patients with MS.)   Total Score (out of 24) 10     Dynamic Gait Index (DGI):The DGI is a measure of balance during gait that is reliable and valid for the elderly and individuals with Parkinson's disease, MS, vestibular disorders, or s/p stroke. Gait assistive device used: FWW     Patient score: 10/24  Scores ?19/24 indicate an increased risk for falls according to Natalia et al 2000  Minimal Detectable Change = 2.9 in community dwelling elderly according to Noe et al 2011    Assessment (rationale for performing, application to patient s function & care plan): Performed DGI to assess balance and falls risk. Pt scored 10/24, indicating pt at increased risk for falls. Recommend pt use assistive device for mobility.  Minutes billed as physical performance test 11

## 2024-09-27 ENCOUNTER — HOSPITAL ENCOUNTER (INPATIENT)
Facility: CLINIC | Age: 69
LOS: 6 days | Discharge: HOME | DRG: 057 | End: 2024-10-03
Attending: PHYSICAL MEDICINE & REHABILITATION | Admitting: PHYSICAL MEDICINE & REHABILITATION
Payer: MEDICARE

## 2024-09-27 VITALS
BODY MASS INDEX: 24.66 KG/M2 | SYSTOLIC BLOOD PRESSURE: 125 MMHG | OXYGEN SATURATION: 90 % | DIASTOLIC BLOOD PRESSURE: 73 MMHG | WEIGHT: 162.7 LBS | HEIGHT: 68 IN | TEMPERATURE: 97.9 F | RESPIRATION RATE: 18 BRPM | HEART RATE: 56 BPM

## 2024-09-27 DIAGNOSIS — I63.9 ISCHEMIC STROKE (H): ICD-10-CM

## 2024-09-27 DIAGNOSIS — E78.5 HYPERLIPIDEMIA LDL GOAL <70: ICD-10-CM

## 2024-09-27 DIAGNOSIS — E11.65 UNCONTROLLED TYPE 2 DIABETES MELLITUS WITH HYPERGLYCEMIA (H): Primary | ICD-10-CM

## 2024-09-27 DIAGNOSIS — I63.50 POSTERIOR CIRCULATION STROKE (H): ICD-10-CM

## 2024-09-27 DIAGNOSIS — I67.2 INTRACRANIAL ATHEROSCLEROSIS: ICD-10-CM

## 2024-09-27 DIAGNOSIS — G08 CEREBRAL VENOUS SINUS THROMBOSIS: ICD-10-CM

## 2024-09-27 LAB
GLUCOSE BLDC GLUCOMTR-MCNC: 104 MG/DL (ref 70–99)
GLUCOSE BLDC GLUCOMTR-MCNC: 106 MG/DL (ref 70–99)
GLUCOSE BLDC GLUCOMTR-MCNC: 115 MG/DL (ref 70–99)
GLUCOSE BLDC GLUCOMTR-MCNC: 116 MG/DL (ref 70–99)
GLUCOSE BLDC GLUCOMTR-MCNC: 123 MG/DL (ref 70–99)
GLUCOSE BLDC GLUCOMTR-MCNC: 191 MG/DL (ref 70–99)

## 2024-09-27 PROCEDURE — 128N000003 HC R&B REHAB

## 2024-09-27 PROCEDURE — 250N000013 HC RX MED GY IP 250 OP 250 PS 637

## 2024-09-27 PROCEDURE — 250N000012 HC RX MED GY IP 250 OP 636 PS 637

## 2024-09-27 PROCEDURE — 99223 1ST HOSP IP/OBS HIGH 75: CPT | Mod: GC

## 2024-09-27 PROCEDURE — 99239 HOSP IP/OBS DSCHRG MGMT >30: CPT | Performed by: INTERNAL MEDICINE

## 2024-09-27 PROCEDURE — 250N000013 HC RX MED GY IP 250 OP 250 PS 637: Performed by: INTERNAL MEDICINE

## 2024-09-27 RX ORDER — DEXTROSE MONOHYDRATE 25 G/50ML
25-50 INJECTION, SOLUTION INTRAVENOUS
Status: DISCONTINUED | OUTPATIENT
Start: 2024-09-27 | End: 2024-10-03 | Stop reason: HOSPADM

## 2024-09-27 RX ORDER — METFORMIN HCL 500 MG
500 TABLET, EXTENDED RELEASE 24 HR ORAL
Qty: 90 TABLET | Refills: 0 | Status: ON HOLD | DISCHARGE
Start: 2024-09-27 | End: 2024-10-02

## 2024-09-27 RX ORDER — LANOLIN ALCOHOL/MO/W.PET/CERES
3 CREAM (GRAM) TOPICAL
Status: DISCONTINUED | OUTPATIENT
Start: 2024-09-27 | End: 2024-10-03 | Stop reason: HOSPADM

## 2024-09-27 RX ORDER — ATORVASTATIN CALCIUM 80 MG/1
80 TABLET, FILM COATED ORAL EVERY EVENING
Status: DISCONTINUED | OUTPATIENT
Start: 2024-09-27 | End: 2024-10-03 | Stop reason: HOSPADM

## 2024-09-27 RX ORDER — LOSARTAN POTASSIUM 25 MG/1
25 TABLET ORAL DAILY
Status: DISCONTINUED | OUTPATIENT
Start: 2024-09-27 | End: 2024-09-27 | Stop reason: HOSPADM

## 2024-09-27 RX ORDER — ACETAMINOPHEN 325 MG/1
650 TABLET ORAL EVERY 4 HOURS PRN
Status: DISCONTINUED | OUTPATIENT
Start: 2024-09-27 | End: 2024-10-03 | Stop reason: HOSPADM

## 2024-09-27 RX ORDER — METFORMIN HCL 500 MG
500 TABLET, EXTENDED RELEASE 24 HR ORAL
Status: DISCONTINUED | OUTPATIENT
Start: 2024-09-27 | End: 2024-10-03 | Stop reason: HOSPADM

## 2024-09-27 RX ORDER — BISACODYL 10 MG
10 SUPPOSITORY, RECTAL RECTAL ONCE
Status: COMPLETED | OUTPATIENT
Start: 2024-09-27 | End: 2024-09-27

## 2024-09-27 RX ORDER — LOSARTAN POTASSIUM 25 MG/1
25 TABLET ORAL DAILY
Status: DISCONTINUED | OUTPATIENT
Start: 2024-09-28 | End: 2024-10-03 | Stop reason: HOSPADM

## 2024-09-27 RX ORDER — POLYETHYLENE GLYCOL 3350 17 G/17G
17 POWDER, FOR SOLUTION ORAL DAILY
Status: DISCONTINUED | OUTPATIENT
Start: 2024-09-28 | End: 2024-09-30

## 2024-09-27 RX ORDER — LOSARTAN POTASSIUM 50 MG/1
25 TABLET ORAL DAILY
Status: ON HOLD | DISCHARGE
Start: 2024-09-27 | End: 2024-10-02

## 2024-09-27 RX ORDER — CALCIUM CARBONATE 500 MG/1
1000 TABLET, CHEWABLE ORAL 3 TIMES DAILY PRN
Status: DISCONTINUED | OUTPATIENT
Start: 2024-09-27 | End: 2024-10-03 | Stop reason: HOSPADM

## 2024-09-27 RX ORDER — LOSARTAN POTASSIUM 50 MG/1
50 TABLET ORAL DAILY
Status: DISCONTINUED | OUTPATIENT
Start: 2024-09-27 | End: 2024-09-27

## 2024-09-27 RX ORDER — NICOTINE POLACRILEX 4 MG
15-30 LOZENGE BUCCAL
Status: DISCONTINUED | OUTPATIENT
Start: 2024-09-27 | End: 2024-10-03 | Stop reason: HOSPADM

## 2024-09-27 RX ORDER — AMOXICILLIN 250 MG
2 CAPSULE ORAL 2 TIMES DAILY
Status: DISCONTINUED | OUTPATIENT
Start: 2024-09-27 | End: 2024-09-30

## 2024-09-27 RX ADMIN — APIXABAN 10 MG: 5 TABLET, FILM COATED ORAL at 20:05

## 2024-09-27 RX ADMIN — INSULIN ASPART 2 UNITS: 100 INJECTION, SOLUTION INTRAVENOUS; SUBCUTANEOUS at 17:19

## 2024-09-27 RX ADMIN — POLYETHYLENE GLYCOL 3350 17 G: 17 POWDER, FOR SOLUTION ORAL at 08:52

## 2024-09-27 RX ADMIN — ATORVASTATIN CALCIUM 80 MG: 80 TABLET, FILM COATED ORAL at 20:05

## 2024-09-27 RX ADMIN — APIXABAN 10 MG: 5 TABLET, FILM COATED ORAL at 08:53

## 2024-09-27 RX ADMIN — LOSARTAN POTASSIUM 25 MG: 25 TABLET, FILM COATED ORAL at 08:54

## 2024-09-27 RX ADMIN — SENNOSIDES AND DOCUSATE SODIUM 2 TABLET: 50; 8.6 TABLET ORAL at 20:05

## 2024-09-27 RX ADMIN — METFORMIN HYDROCHLORIDE 500 MG: 500 TABLET, EXTENDED RELEASE ORAL at 17:18

## 2024-09-27 ASSESSMENT — ACTIVITIES OF DAILY LIVING (ADL)
ADLS_ACUITY_SCORE: 33
ADLS_ACUITY_SCORE: 26
ADLS_ACUITY_SCORE: 33
ADLS_ACUITY_SCORE: 26
ADLS_ACUITY_SCORE: 33
ADLS_ACUITY_SCORE: 33
ADLS_ACUITY_SCORE: 23
ADLS_ACUITY_SCORE: 38
ADLS_ACUITY_SCORE: 26
ADLS_ACUITY_SCORE: 33
ADLS_ACUITY_SCORE: 26
ADLS_ACUITY_SCORE: 33
ADLS_ACUITY_SCORE: 26
ADLS_ACUITY_SCORE: 33
ADLS_ACUITY_SCORE: 26
ADLS_ACUITY_SCORE: 23
ADLS_ACUITY_SCORE: 26
ADLS_ACUITY_SCORE: 33
ADLS_ACUITY_SCORE: 26
ADLS_ACUITY_SCORE: 33

## 2024-09-27 NOTE — PLAN OF CARE
Physical Therapy Discharge Summary    Reason for therapy discharge:    Discharged to acute rehabilitation facility.    Progress towards therapy goal(s). See goals on Care Plan in [facility] electronic health record for goal details.  Goals not met.  Barriers to achieving goals:   discharge from facility and Patient still requiring up to CGA to mobility with fall risk and mild deficits noted.    Therapy recommendation(s):    Continued therapy is recommended.  Rationale/Recommendations:   .

## 2024-09-27 NOTE — H&P
[facility]   Acute Rehabilitation Unit  Admission History and Physical      CHIEF COMPLAINT   Right Pontine Ischemic Stroke     HISTORY OF PRESENT ILLNESS  Francisco J Felipe is a 69 year old male with pmh HTN, T2DM c/b diabetic foot ulcers and charcot foot, who presented to the [facility] emergency department on 9/23 with a 3 day history of left-sided facial droop and unsteady gait. Stroke workup was initiated and MRI brain showed acute CVA of the right Robb shalom. He also was noted to have Left transverse sigmoid and sigmoid sinus occlusion, without headache. 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 He was subsequently started on IV heparin drip per neurology recommendation on 9/24/2024.  He was switched to oral Eliquis on 9/27. Hospital course was further complicated by vasovagal syncope suspected due to constipation; pt was started on miralax and lactulose.     During acute hospitalization, patient was seen and evaluated by PT, OT, and SLP  who collectively recommended that patient would benefit from ongoing therapies in the acute inpatient rehabilitation setting, and patient was admitted to the [facility] unit on 9/27/2024.      In review of recent therapy notes:  PT Current Function Goals for Rehab   Bed Rolling 4 Supervision or touching assistance 6 Independent   Supine to Sit 3 Partial/moderate assistance 6 Independent   Sit to Stand 3 Partial/moderate assistance 6 Independent   Transfer 3 Partial/moderate assistance 6 Independent   Ambulation 4 Supervision or touching assistance (CGA with WW) 6 Independent   Stairs 88 Not attempted due to safety 6 Independent      OT Current Function Goals for Rehab   Feeding 4  Supervision or touching assistance 6 Independent   Grooming 3 Partial/moderate assistance 6 Independent   Bathing Not completed 6 Independent   Upper Body Dressing Not completed 6 Independent   Lower Body Dressing 3 Partial/moderate assistance 6 Independent   Toileting 3 Partial/moderate assistance 6 Independent   Toilet Transfer 3 Partial/moderate assistance 6 Independent   Tub/Shower Transfer 88 Not attempted due to safety 6 Independent   Cognition Not Assessed Independent      SLP Current Function Goals for Rehab   Swallow Impaired Tolerate least restrictive diet without signs & symptoms of aspiration and adhere to safe swallow strategies   Communication Impaired Communicate basic needs effectively       Upon arrival to the rehab unit, patient was seen at the bedside.   Mr. Felipe reports no acute concerns upon arriving to the acute rehab unit.  We discussed his upcoming daily schedule including 3 hours of physical therapy, Occupational Therapy, and speech-language pathology appointments 6 times weekly.  His questions were answered regarding this.  He reports ongoing difficulty with constipation.  He reports that his last good bowel movement was roughly 1 week ago, but he has had several small bowel movements during his acute hospitalization.  He does continue to pass gas.  Patient discussed his blood sugars.  He states that he intermittently took metformin while at home, mostly due to feeling pretty good and experiencing intermittent GI distress as a result of the medication.     He denies any acute headaches, shortness of breath, chest pain, stomach ache.    PAST MEDICAL HISTORY   Reviewed and updated in Epic.  No past medical history on file.    Hypertension, hyperlipidemia, type 2 diabetes, Charcot left foot, diabetic ulceration     SURGICAL HISTORY  Reviewed and updated in Epic.  No past surgical history on file.    SOCIAL HISTORY  Reviewed and updated in [employer].    People in Home: sibling(s), significant  other  Current Living Arrangements: house  Home Accessibility: stairs to enter home  Number of Stairs, Main Entrance: 3  Stair Railings, Main Entrance: railing on left side (ascending)  Transportation Anticipated: family or friend will provide  Living Environment Comments: Pt lives in a rambler with his siblings, he has a girlfriend who is over frequently as well. Pt states someone is there all the time if he needs assist. 3 VALERIE with all needs met on main level    Per chart review: Francisco J Felipe  reports that he has never smoked. He has never used smokeless tobacco. He reports that he does not drink alcohol and does not use drugs.    PRIOR FUNCTIONAL HISTORY   Pt was independent with all ADLs/IADLs, transfers, mobility and gait with no assistive device.        Social History     Socioeconomic History    Marital status: Single     Spouse name: Not on file    Number of children: Not on file    Years of education: Not on file    Highest education level: Not on file   Occupational History    Not on file   Tobacco Use    Smoking status: Never    Smokeless tobacco: Never   Substance and Sexual Activity    Alcohol use: No    Drug use: No    Sexual activity: Yes     Partners: Female   Other Topics Concern    Not on file   Social History Narrative    Not on file     Social Determinants of Health     Financial Resource Strain: Low Risk  (9/24/2024)    Financial Resource Strain     Within the past 12 months, have you or your family members you live with been unable to get utilities (heat, electricity) when it was really needed?: No   Food Insecurity: Low Risk  (9/24/2024)    Food Insecurity     Within the past 12 months, did you worry that your food would run out before you got money to buy more?: No     Within the past 12 months, did the food you bought just not last and you didn t have money to get more?: No   Transportation Needs: Low Risk  (9/24/2024)    Transportation Needs     Within the past 12 months, has lack of  "transportation kept you from medical appointments, getting your medicines, non-medical meetings or appointments, work, or from getting things that you need?: No   Physical Activity: Not on file   Stress: Not on file   Social Connections: Not on file   Interpersonal Safety: Low Risk  (9/27/2024)    Interpersonal Safety     Do you feel physically and emotionally safe where you currently live?: Yes     Within the past 12 months, have you been hit, slapped, kicked or otherwise physically hurt by someone?: No     Within the past 12 months, have you been humiliated or emotionally abused in other ways by your partner or ex-partner?: No   Housing Stability: Low Risk  (9/24/2024)    Housing Stability     Do you have housing? : Yes     Are you worried about losing your housing?: No       FAMILY HISTORY  Reviewed and updated in Epic.  No family history on file.    MEDICATIONS  Scheduled meds  Medications Prior to Admission   Medication Sig Dispense Refill Last Dose    apixaban ANTICOAGULANT (ELIQUIS) 5 MG tablet Take 2 tablets (10 mg) by mouth 2 times daily for 6 days, THEN 1 tablet (5 mg) 2 times daily for 24 days.  2     atorvastatin (LIPITOR) 80 MG tablet Take 1 tablet (80 mg) by mouth every evening. 90 tablet 0     insulin glargine (LANTUS PEN) 100 UNIT/ML pen Inject 15 Units subcutaneously every morning (before breakfast). 15 mL 0     losartan (COZAAR) 50 MG tablet Take 0.5 tablets (25 mg) by mouth daily.       metFORMIN (GLUCOPHAGE XR) 500 MG 24 hr tablet Take 1 tablet (500 mg) by mouth daily (with dinner). 90 tablet 0     polyethylene glycol (MIRALAX) 17 GM/Dose powder Take 17 g by mouth daily.          ALLERGIES   No Known Allergies    REVIEW OF SYSTEMS  A 10 point ROS was performed and negative unless otherwise noted in HPI.     PHYSICAL EXAM  VITAL SIGNS:  /73 (BP Location: Left arm)   Pulse 73   Temp 97.3  F (36.3  C) (Oral)   Resp 18   Ht 1.727 m (5' 8\")   SpO2 98%   BMI 24.74 kg/m    BMI:  Estimated " "body mass index is 24.74 kg/m  as calculated from the following:    Height as of this encounter: 1.727 m (5' 8\").    Weight as of 9/25/24: 73.8 kg (162 lb 11.2 oz).     Gen: No acute distress, cooperative. Seated in recliner.   CV: Regular rate, rhythm, no murmurs.  Respiratory: CTAB, no wheezing, crackles or rhonchi. Breathing comfortably on room air.  Abd: Bowel sounds present. Soft, non-distended, non-tender to palpation in four quadrants.  Extremities: 2+ Pitting edema of the LLE, known LLE DVT's. No erythema or increased warmth. Small non-tender area of fluctuance immediately lateral to the right patellar tendon in full knee extension.   Skin: No signs of trauma / abrasions on exposed skin.   Neuromuscular: Speech fluent & comprehensible.    Cranial Nerves: grossly normal   - 2nd CN: Pupils equal, round, reactive to light and accomodation. and visual fields intact to confrontation.   - 3rd,4th,6th CN:  EOMI, appropriate pupillary responses  - 5th CN: facial sensation intact   - 7th CN: face symmetrical   - 8th CN: Decreased hearing in left ear, chronic  - 9th, 10th CN: palate elevates symmetrically   - 11th CN: sternocleidomastoids and trapezii strong   - 12th CN: tongue midline and without fasciculations     Sensory: Diminished sensation in the left foot dt diabetic neuropathy. Otherwise grossly normal to light touch in bilateral upper and lower extremities     Motor:                                 Right                          Left  Shoulder abd                   5/5                          5/5  Elbow Flexion                  5/5                          5/5  Elbow Extension              5/5                          5/5  Wrist Extension               5/5                          5/5  Wrist Flexion                    5/5                          5/5  FDP                                  5/5                          5/5  Abd dig. Minimi                 5/5                          5/5    Hip Flexion          "               5/5                          5/5  Knee Extension               5/5                          5/5  Knee Flexion                   5/5                          5/5  Dorsiflexion                     5/5                          5/5  EHL                                 5/5                          5/5  Plantarflexion                   5/5                          5/5     Reflexes: Normal bilateral reflexes for B,BR,T, P. Normal right and 1+ left achilles reflex.     Lockhart's test: negative bilaterally    Babinski reflex: normal downgoing bilaterally    Tone per Modified Evelia Scale: No increased tone noted   Abnormal movements: None    Coordination: No dysmetria on finger to nose b/l or F2S bilaterally. . Slightly decreased speed in left sided rapid alternating movement.     LABS  Recent Results (from the past 24 hour(s))   Glucose by meter    Collection Time: 09/26/24  5:14 PM   Result Value Ref Range    GLUCOSE BY METER POCT 152 (H) 70 - 99 mg/dL   Glucose by meter    Collection Time: 09/26/24 10:02 PM   Result Value Ref Range    GLUCOSE BY METER POCT 177 (H) 70 - 99 mg/dL   Glucose by meter    Collection Time: 09/27/24  3:24 AM   Result Value Ref Range    GLUCOSE BY METER POCT 104 (H) 70 - 99 mg/dL   Glucose by meter    Collection Time: 09/27/24  3:50 AM   Result Value Ref Range    GLUCOSE BY METER POCT 116 (H) 70 - 99 mg/dL   Glucose by meter    Collection Time: 09/27/24  7:29 AM   Result Value Ref Range    GLUCOSE BY METER POCT 106 (H) 70 - 99 mg/dL   Glucose by meter    Collection Time: 09/27/24  1:52 PM   Result Value Ref Range    GLUCOSE BY METER POCT 123 (H) 70 - 99 mg/dL       IMPRESSION/PLAN:  Francisco J Felipe is a 69 year old right hand dominant male with past medical history of HTN, T2DM c/b diabetic foot ulcerations and left charcot foot, who initially presented to [facility] on 9/23 with a 3 day history of left sided facial droop, and unsteady gait. Stroke workup revealed a small acute  infarct of the right hemipons. He was subsequently admitted to the  ARU on 9/27/2024 for ongoing rehabilitation. Deficits include impairments to mobility, ability to do ADLs, balance, coordination, cognition, processing, swallowing. Patient will require and benefit from PT, OT, and SLP services as well as all of the ancillary services offered in the inpatient rehab setting.      Admission to acute inpatient rehab: 9/27/2024   Impairment group code: Stroke Ischemic 01.1 (L) Body Involvement (R) Brain; Acute ischemic stroke of right pontine area due to small-vessel occlusion and right frontal area due to ESUS     PT, OT and SLP 60 minutes of each on a daily basis, 6x a week,  in addition to rehab nursing and close management of physiatrist.     Impairment of ADL's:  OT for 60 min daily, 6x a week to work on upper and lower body self care, dressing, toileting, bathing, energy conservation techniques with use of ADs as needed   Impairment of mobility:   PT for 60 min daily, 6x a week  to work on gait exercises, strengthening, endurance buildup, transfers with use of walker as needed   Impairment of cognition/language/swallow:   SLP for 60 min daily, 6x a week  for cognitive evaluation and treatment strategies for higher level cognitive deficits and memory impairment   Rehab RN to administer medication, patient education on medication taking, VS monitoring, bowel regimen, glucose monitoring and wound care/surgical wound dressing changes and monitoring.     Medical Conditions    # Acute Ischemic Stroke of the Right Robb shalom, ESUS (9/20/24)  # Dysphagia  # Hypertension  # Hyperlipidemia  Onset of symptoms 9/20/2024 with gait instability and facial droop. Stroke workup revealed acute infarction of the right robb-shalom. TTE on 9/24 did not demonstrate PFO or identifiable source of embolism. A1c=12.8%, Cholesterol 219, , Triglycerides 79. (9/23). Telemetry during acute stay did not identify atrial fibrillation. On  admission his strength is overall well maintained. Left-sided coordination is mildly impaired. Dysphagia was noted at OSH.   - PT/OT/SLP  - Continue Lipitor 80 mg p.o. daily. Goal < 70.  - Continue losartan 25mg QD. Will likely increase in following days with BP goal < 130/80 mmhg.   - DAPT: Loaded with plavix on 9/24. Continue plavix 75 mg QD x3 months. ASA 81mg QD indefinitely.   - Ziopatch sent to home. Should apply after discharge.   - Outpatient stroke neurology follow up in 6-8 weeks.     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 No headache, no seizure history. 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  -Seizure precautions    # Common Femoral DVT, Left Lower Extremity (9/24/24)  # Deep Femoral DVT, Left Lower Extremity (9/24/24)  # Popliteal DVT, Left Lower Extremity (9/24/24)  # Peroneal Vein DVT, Left Lower Extremity (9/24/24)  Patient was found to have left lower extremity DVT on 9/24/2024.  Started on low-dose heparin drip at OSH prior to transitioning to eliquis. Ongoing LLE edema.   - Continue apixaban 5mg BID    # Constipation  # Vasovagal syncope  Patient experienced an episode of vasovagal syncope while having a bowel movement at OSH on 9/25. Symptoms included  bradycardia (HR=27 bpm), nausea, and vomiting.  and diaphoresis. Twelve-lead EKG was done and showed normal sinus rhythm with no acute changes. He reports that his last good bowel movement was 1 week PTA. Several small BM's during acute hospitalization.   - Continue miralax 17 g daily  - Senna/docusate 2 tabs BID  - Bisacodyl suppository tonight (scheduled) then QD PRN       # Type 2 Diabetes Mellitus, Uncontrolled   # Charcot Foot, Left  Prior to acute hospitalization, patient had been taking metformin 500mg roughly every 3 days. A1C found to be 12.8% on  9/23/24.  -Continue metformin 500 mg switch to metformin  mg p.o. daily on discharge  - Continue Lantus 15 units subQ qAM.  - Medium Dose sliding scale insulin  - BG checks TID + AC  - Orthotics order placed for left diabetic shoe.   - Hypoglycemia protocol      # Charcot joint of left lower extremity:  CROW boot has been recommended by podiatry during ambulation.  Can be utilized if patient is able to have this brought from home  -Fall precautions     # Adjustment to disability:  Will hold on ordering rehab psychology at this time, will monitor mood over course of rehab stay    FEN: Soft, easy to chew. Thin Liquids  DVT Prophylaxis: Apixaban 5mg BID  GI Prophylaxis:  None  Code: Full, Confirmed on admission  Disposition: TBD, to discuss at interdiscipinary rounds   ELOS/Discharge Date:  10/5/24.  Rehab prognosis:  Good   Follow up Appointments on Discharge:   Outpatient PCP.   Outpatient PM&R.   Outpatient therapies.   Stroke Neurology (6-8 weeks)    Seen and discussed with Dr. Juanito MARADIAGA, PM&R staff physician     --   Hira Vasquez MD  [facility] PM&R PGY-2  Pager: [phone]

## 2024-09-27 NOTE — PLAN OF CARE
Reason for Admission: Right pontine CVA    Cognitive/Mentation: A/Ox 4, no forgetfulness noted this shift  Neuros/CMS: Intact ex 4/5 in LLE, slight left facial droop, and +2 edema in LLE.  VS: VSS, permissive HTN.   Tele: SB/NSR, lowest HR 58; MD aware.  /GI: WNL. Continent. Last BM yesterday, 9/26.   Pulmonary: LS clear.  Pain: Denies.     Drains/Lines: PIV removed prior to discharge  Skin: Mild discoloration to left ankle. Otherwise WNL  Activity: Assist x 1 with GB/walker.  Diet: Easy to chew with thin liquids. Takes pills whole, one at a time, with water.     Therapies recs: ARU  Discharge: Discharging to ARU at approx. 1100 with family to transport    Aggression Stoplight Tool: Green    End of shift summary: Lantus insulin and stroke education provided to patient and family member. AVS reviewed and all questions answered. Report given to acute rehab nurse. Patient was assisted to wheelchair and escorted to family member's car for discharge. All personal belongings given back to patient at time of discharge.

## 2024-09-27 NOTE — PLAN OF CARE
Occupational Therapy Discharge Summary    Reason for therapy discharge:    Discharged to acute rehabilitation facility.    Progress towards therapy goal(s). See goals on Care Plan in [facility] electronic health record for goal details.  Goals partially met.  Barriers to achieving goals:   discharge from facility.    Therapy recommendation(s):    Continued therapy is recommended.  Rationale/Recommendations:  At this time, pt below baseline requiring min A-CGA with mobility and toilet transfer with FWW. L side weakness present. Pt would benefit from intensive rehab to increase indep with functional mobility and self-cares prior to return home.

## 2024-09-27 NOTE — PLAN OF CARE
Goal Outcome Evaluation:         Cognitive/Mentation: A/Ox 4 can be forgetful at times  Neuros/CMS: L facial droop, LLE weakness, other neuro's unchanged   VS: VSS on RA.   Tele: NSR   /GI: continent/incontinent of urine at night   Assist: Assist of 1 G/W  Pain: denies.    Activity: Assist x 1 with GBW.  Discharge: ARU  from family 11am for discharge

## 2024-09-27 NOTE — DISCHARGE SUMMARY
[facility]  Hospitalist Discharge Summary      Date of Admission:  9/23/2024  Date of Discharge:  9/27/2024 11:30 AM  Discharging Provider: Lashonda Medina MD  Discharge Service: Hospitalist Service    Discharge Diagnoses   Francisco J Felipe is a 69 year old male admitted on 9/23/2024. He presents to the emergency department for evaluation of left-sided facial droop and unsteady gait over the past 3 days.  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  Left lower extremity ultrasound noted to have DVT.  Started on low intensity IV heparin drip per neurology recommendation on 9/24/2024.  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  Improved 9/21, but has persisted leading to presentation in the emergency department tonight.  In the setting of hypertension, uncontrolled type 2 diabetes.  -MRI brain with and without contrast done showed small acute infarct right Robb shalom.  No hemorrhage or mass effect.  Age-related and chronic ischemic changes.  Abnormal left transverse and sigmoid sinus flow-voids corresponding to the abnormality on CTA  -Every 4 hours neurochecks and vital signs  -Stroke neurology consulted, aware of patient from emergency department  Patient was found to have left lower extremity DVT on 9/24/2024.  Started on low-dose heparin drip  CT head to be repeated on 9/25/2024 if looks okay will increase the heparin drip to high intensity  Now switched to oral Eliquis.  Tolerating it well  Started on Lipitor 80 mg p.o. daily in the acute setting with stroke  Started on losartan 25 mg p.o. daily with diabetes and hypertension and for kidney protection and to prevent further  strokes    Left transverse sigmoid and sigmoid sinus occlusion: Noted on CTA.  No headache, no seizure history.  -neurology consulted.  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  -Seizure precautions     Vasovagal syncope;  Patient was to have a bowel movement and then he became bradycardic and sweaty with heart rate of 27 and then had a brief episode of unresponsiveness on 9/25/2024  Twelve-lead EKG was done and showed normal sinus rhythm with no acute changes  RRT was called and his was evaluated by house provider  Patient was also nauseated and vomited  Patient was constipated prior to this episode was started on lactulose.  MiraLAX 17 g daily added  Continue to monitor him on telemetry.  No further bradycardia noted on telemetry        Left lower extremity swelling: Patient reports he has had left lower extremity swelling for the past year.  [place] [facility] [place]  I see arterial Doppler previously performed in 2022 through his podiatry team as well as a right lower extremity venous ultrasound in 2019  -Left lower extremity venous duplex ultrasound done showed left lower extremity DVT  Started on low intensity heparin drip.  Repeat CT head was stable  Switched him to oral Eliquis     Uncontrolled type 2 diabetes: History of diabetic foot ulcers, none currently present.  Nonadherent with metformin, utilizing a 500 mg dose every 3 days or so by his recollection.  Hemoglobin A1c of 12.8.    -Continue metformin 500 mg switch to metformin  mg p.o. daily on discharge     -Hemohypoglycemia protocol in place  -When patient is cleared for oral intake by speech pathology, recommend initiating oral hypoglycemic agent to reduce anticipated insulin need and associated costs.  Increased  Lantus to 15 units subcu daily.  Blood sugars are well-controlled currently     Charcot joint of left lower extremity:  -CROW boot  has been recommended by podiatry during ambulation.  Can be utilized if patient is able to have this brought from home  -Fall precautions                    Diet: Combination Diet Moderate Consistent Carb (60 g CHO per Meal) Diet; Easy to Chew (level 7); Thin Liquids (level 0) (No straws, upright, small bites/sips and alternate liquids solids.)  Room Service    DVT Prophylaxis: Eliquis  Levine Catheter: Not present  Lines: None     Cardiac Monitoring: ACTIVE order. Indication: Stroke, acute (48 hours)  Code Status: Full Code          Clinically Significant Risk Factors                             # DMII: A1C = 12.8 % (Ref range: <5.7 %) within past 6 months, PRESENT ON ADMISSION         # Financial/Environmental Concerns: none (denies)         Clinically Significant Risk Factors     # DMII: A1C = 12.8 % (Ref range: <5.7 %) within past 6 months       Follow-ups Needed After Discharge   Follow-up Appointments     Follow Up and recommended labs and tests      F/u with ARU provider in 1week. Recheck CBC, BMP in 1week           Unresulted Labs Ordered in the Past 30 Days of this Admission       No orders found from 8/24/2024 to 9/24/2024.            Discharge Disposition   Discharged to ARU  Condition at discharge: Stable      Consultations This Hospital Stay   NEUROLOGY IP STROKE CONSULT  SPEECH LANGUAGE PATH ADULT IP CONSULT  PHARMACY IP CONSULT  PHARMACY IP CONSULT  PHARMACY IP CONSULT  PHYSICAL THERAPY ADULT IP CONSULT  OCCUPATIONAL THERAPY ADULT IP CONSULT  REHAB ADMISSIONS LIAISON IP CONSULT  CARE MANAGEMENT / SOCIAL WORK IP CONSULT  PHARMACY IP CONSULT  PHARMACY LIAISON FOR MEDICATION COVERAGE CONSULT  PHARMACY IP CONSULT  PHYSICAL THERAPY ADULT IP CONSULT  OCCUPATIONAL THERAPY ADULT IP CONSULT    Code Status   Full Code    Time Spent on this Encounter   I, Lashonda Medina MD, personally saw the patient today and spent greater than 30 minutes discharging this patient.       Lashonda Medina MD  [facility]  [facility] NEUROSCIENCE UNIT  [address] [place] [address]  Phone: [phone]  ______________________________________________________________________    Physical Exam   Vital Signs: Temp: 97.9  F (36.6  C) Temp src: Oral BP: 125/73 Pulse: 56   Resp: 18 SpO2: 90 % O2 Device: None (Room air)    Weight: 162 lbs 11.2 oz       General Appearance:  Alert awake, not in acute distress  Respiratory: Clear to auscultation bilaterally, no crackles or wheezing heard  Cardiovascular: Normal rate rhythm regular  GI: Soft, nontender nondistended bowel sounds positive  Skin: No rashes or lesions noted  Other:  Mild left-sided weakness with 4 x 5 strength, right side 5 x 5 strength noted.  Mild left-sided facial droop       Primary Care Physician   Physician No Ref-Primary    Discharge Orders      Follow Up and recommended labs and tests    F/u with ARU provider in 1week. Recheck CBC, BMP in 1week     Reason for your hospital stay    Acute stroke and Left leg Dvt started on Eliquis. Type 2 DM uncontrolled started on Lantus insulin     Glucose monitor nursing POCT    Before meals and at bedtime     Activity - Up with nursing assistance     Full Code     Physical Therapy Adult Consult    Evaluate and treat as clinically indicated.    Reason:  Acute stroke     Occupational Therapy Adult Consult    Evaluate and treat as clinically indicated.    Reason:  acute stroke     Fall precautions     Diet    Follow this diet upon discharge: Current Diet:Orders Placed This Encounter      Room Service      Combination Diet Moderate Consistent Carb (60 g CHO per Meal) Diet; Easy to Chew (level 7); Thin Liquids (level 0) (No straws, upright, small bites/sips and alternate liquids solids.)     Stroke Hospital Follow Up (for neurologist use only)    [employer] will call you to coordinate care as prescribed by your provider. If you don t hear from a representative within 2 business days, please call [phone].       ZIO PATCH MAIL  OUT     ZIO PATCH MAIL OUT       Significant Results and Procedures   Most Recent 3 CBC's:  Recent Labs   Lab Test 09/26/24  0811 09/25/24  0557 09/23/24 2156   WBC 7.2 6.5 6.4   HGB 12.5* 12.7* 12.5*   MCV 91 91 92    196 203     Most Recent 3 BMP's:  Recent Labs   Lab Test 09/27/24  0729 09/27/24  0350 09/27/24  0324 09/26/24  0841 09/26/24  0811 09/25/24  0729 09/25/24  0557 09/24/24  0446 09/23/24 2156   NA  --   --   --   --  138  --  137  --  136   POTASSIUM  --   --   --   --  4.1  --  4.0  --  3.8   CHLORIDE  --   --   --   --  104  --  104  --  100   CO2  --   --   --   --  21*  --  23  --  25   BUN  --   --   --   --  16.4  --  15.0  --  20.4   CR  --   --   --   --  0.88  --  0.94  --  1.13   ANIONGAP  --   --   --   --  13  --  10  --  11   ERIK  --   --   --   --  9.3  --  9.1  --  9.2   * 116* 104*   < > 97   < > 155*   < > 304*    < > = values in this interval not displayed.     Most Recent 2 LFT's:  Recent Labs   Lab Test 09/23/24 2156   AST 18   ALT 21   ALKPHOS 81   BILITOTAL 0.3     Most Recent 3 INR's:No lab results found.  Most Recent INR's and Anticoagulation Dosing History:  Anticoagulation Dose History           No data to display              Most Recent 3 Creatinines:  Recent Labs   Lab Test 09/26/24  0811 09/25/24  0557 09/23/24 2156   CR 0.88 0.94 1.13     Most Recent 3 Hemoglobins:  Recent Labs   Lab Test 09/26/24  0811 09/25/24  0557 09/23/24 2156   HGB 12.5* 12.7* 12.5*     Most Recent 3 Troponin's:No lab results found.  Most Recent 3 BNP's:No lab results found.  Most Recent D-dimer:No lab results found.  Most Recent Cholesterol Panel:  Recent Labs   Lab Test 09/23/24  2353   CHOL 219*   *   HDL 55   TRIG 79     7-Day Micro Results       No results found for the last 168 hours.          Most Recent TSH and T4:No lab results found.  Most Recent Hemoglobin A1c:  Recent Labs   Lab Test 09/23/24  2156   A1C 12.8*     Most Recent 6 glucoses:  Recent Labs   Lab Test  09/27/24  0729 09/27/24  0350 09/27/24  0324 09/26/24  2202 09/26/24  1714 09/26/24  1130   * 116* 104* 177* 152* 139*     Most Recent Urinalysis:No lab results found.  Most Recent ABG:No lab results found.  Most Recent ESR & CRP:No lab results found.  Most Recent Anemia Panel:  Recent Labs   Lab Test 09/26/24  0811   WBC 7.2   HGB 12.5*   HCT 36.6*   MCV 91        Most Recent CPK:No lab results found.,   Results for orders placed or performed during the hospital encounter of 09/23/24   Head CT w/o contrast    Narrative    EXAM: CT HEAD W/O CONTRAST, CTA HEAD NECK W CONTRAST  LOCATION: [facility]  DATE: 9/23/2024    INDICATION: Facial droop, multiple falls this weekend, gait ataxia.  COMPARISON: None.  CONTRAST: 67 mL Isovue-370.  TECHNIQUE: Head and neck CT angiogram with IV contrast. Noncontrast head CT followed by axial helical CT images of the head and neck vessels obtained during the arterial phase of intravenous contrast administration. Axial 2D reconstructed images and   multiplanar 3D MIP reconstructed images of the head and neck vessels were performed by the technologist. Dose reduction techniques were used. All stenosis measurements made according to NASCET criteria unless otherwise specified.    FINDINGS:   NONCONTRAST HEAD CT:   INTRACRANIAL CONTENTS: No intracranial hemorrhage, extraaxial collection, or mass effect.  No CT evidence of acute infarct. Volume loss and presumed chronic small vessel ischemia.    VISUALIZED ORBITS/SINUSES/MASTOIDS: No intraorbital abnormality. No paranasal sinus mucosal disease. No middle ear or mastoid effusion.    BONES/SOFT TISSUES: No acute abnormality.    HEAD CTA:  ANTERIOR CIRCULATION: No stenosis/occlusion, aneurysm, or high flow vascular malformation. Standard [place] anatomy.    POSTERIOR CIRCULATION: Moderate-to-marked focal narrowing of the right P2/P3 junction and moderate changes on the left. No other high-grade  stenosis, branch occlusion, or aneurysm. Balanced vertebral arteries supply a normal basilar artery.     DURAL VENOUS SINUSES: Incompletely occlusive to nearly completely occlusive thrombus in the left transverse sinus and extending into the left sigmoid sinus. Remainder of the visualized dural venous sinuses are grossly patent.    NECK CTA:  RIGHT CAROTID: No measurable stenosis or dissection.    LEFT CAROTID: No measurable stenosis or dissection.    VERTEBRAL ARTERIES: No focal stenosis or dissection. Balanced vertebral arteries.    AORTIC ARCH: Classic aortic arch anatomy with no significant stenosis at the origin of the great vessels.    NONVASCULAR STRUCTURES: Unremarkable.      Impression    IMPRESSION:   HEAD CT:  1.  No acute intracranial abnormality.    2.  Age-related and chronic ischemic changes.    HEAD CTA:   1.  Incompletely occlusive to nearly occlusive thrombus involving the left transverse and sigmoid sinuses.    2.  No proximal arterial branch occlusion or aneurysm.    3.  Narrowing of the P2/P3 junctions bilaterally.    NECK CTA:  1.  Normal neck CTA.   CTA Head Neck with Contrast    Narrative    EXAM: CT HEAD W/O CONTRAST, CTA HEAD NECK W CONTRAST  LOCATION: [facility]  DATE: 9/23/2024    INDICATION: Facial droop, multiple falls this weekend, gait ataxia.  COMPARISON: None.  CONTRAST: 67 mL Isovue-370.  TECHNIQUE: Head and neck CT angiogram with IV contrast. Noncontrast head CT followed by axial helical CT images of the head and neck vessels obtained during the arterial phase of intravenous contrast administration. Axial 2D reconstructed images and   multiplanar 3D MIP reconstructed images of the head and neck vessels were performed by the technologist. Dose reduction techniques were used. All stenosis measurements made according to NASCET criteria unless otherwise specified.    FINDINGS:   NONCONTRAST HEAD CT:   INTRACRANIAL CONTENTS: No intracranial hemorrhage,  extraaxial collection, or mass effect.  No CT evidence of acute infarct. Volume loss and presumed chronic small vessel ischemia.    VISUALIZED ORBITS/SINUSES/MASTOIDS: No intraorbital abnormality. No paranasal sinus mucosal disease. No middle ear or mastoid effusion.    BONES/SOFT TISSUES: No acute abnormality.    HEAD CTA:  ANTERIOR CIRCULATION: No stenosis/occlusion, aneurysm, or high flow vascular malformation. Standard [place] anatomy.    POSTERIOR CIRCULATION: Moderate-to-marked focal narrowing of the right P2/P3 junction and moderate changes on the left. No other high-grade stenosis, branch occlusion, or aneurysm. Balanced vertebral arteries supply a normal basilar artery.     DURAL VENOUS SINUSES: Incompletely occlusive to nearly completely occlusive thrombus in the left transverse sinus and extending into the left sigmoid sinus. Remainder of the visualized dural venous sinuses are grossly patent.    NECK CTA:  RIGHT CAROTID: No measurable stenosis or dissection.    LEFT CAROTID: No measurable stenosis or dissection.    VERTEBRAL ARTERIES: No focal stenosis or dissection. Balanced vertebral arteries.    AORTIC ARCH: Classic aortic arch anatomy with no significant stenosis at the origin of the great vessels.    NONVASCULAR STRUCTURES: Unremarkable.      Impression    IMPRESSION:   HEAD CT:  1.  No acute intracranial abnormality.    2.  Age-related and chronic ischemic changes.    HEAD CTA:   1.  Incompletely occlusive to nearly occlusive thrombus involving the left transverse and sigmoid sinuses.    2.  No proximal arterial branch occlusion or aneurysm.    3.  Narrowing of the P2/P3 junctions bilaterally.    NECK CTA:  1.  Normal neck CTA.   MR Brain w/o & w Contrast    Narrative    EXAM: MR BRAIN W/O and W CONTRAST  LOCATION: [facility]  DATE: 9/24/2024    INDICATION: L sided weakness (CVA)  COMPARISON: CTA head and neck 9/23/2024  CONTRAST: 8mL Gadavist  TECHNIQUE: Routine  multiplanar multisequence head MRI without and with intravenous contrast.    FINDINGS:  INTRACRANIAL CONTENTS: Small acute infarct in the right hemipons measuring 18 x 7 mm. Mild associated FLAIR hyperintensity. No other acute ischemia. No mass, acute hemorrhage, or extra-axial fluid collections. Mild volume loss and minimal burden presumed   chronic small vessel ischemia. Normal position of the cerebellar tonsils. No pathologic contrast enhancement.    SELLA: No abnormality accounting for technique.    OSSEOUS STRUCTURES/SOFT TISSUES: Normal marrow signal. Abnormal left transverse and sigmoid sinus flow voids corresponding to the abnormalities on CTA.    ORBITS: No abnormality accounting for technique.     SINUSES/MASTOIDS: No paranasal sinus mucosal disease. No middle ear or mastoid effusion.       Impression    IMPRESSION:  1.  Small acute infarct right hemipons. No hemorrhage or mass effect.  2.  Age-related and chronic ischemic changes.   3.  Abnormal left transverse and sigmoid sinus flow voids corresponding to the abnormality on CTA.                   US Lower Extremity Venous Duplex Left    Narrative    US LOWER EXTREMITY VENOUS DUPLEX LEFT  9/24/2024 8:37 AM    CLINICAL HISTORY/INDICATION: Asymmetric LLE swelling.    COMPARISON: None relevant    TECHNIQUE:   Grayscale, color-flow, and spectral waveform analysis were performed  of the deep veins of the left lower extremity    FINDINGS:   Nonocclusive deep vein thrombosis in the left common femoral, profunda  femoral, proximal femoral, popliteal and peroneal veins. Posterior  tibial vein is patent. Occlusive deep vein thrombosis in the left mid  and distal femoral veins. Images were obtained in the external iliac,  and DVT does not extend into the external iliac vein. Great saphenous  vein where imaged is patent without evidence of superficial thrombus.  Evaluation of the calf veins was extremely limited secondary to left  lower extremity swelling.    The  contralateral right common femoral vein demonstrates normal  compressibility, spectral waveform, color flow and augmentation.      Impression    IMPRESSION: Deep vein thrombosis in the left common femoral, profunda  femoral, femoral, popliteal and peroneal veins. DVT does not extend  into the external iliac vein.    Findings were discussed with Licha Jean RN at 8:57 AM on  9/24/2024.    CANDE WRIGHT DO         SYSTEM ID:  [id number]   CT Head w/o Contrast    Narrative    CT SCAN OF THE HEAD WITHOUT CONTRAST   9/25/2024 11:47 AM     HISTORY: Stroke.    TECHNIQUE:  Axial images of the head and coronal reformations without  IV contrast material. Radiation dose for this scan was reduced using  automated exposure control, adjustment of the mA and/or kV according  to patient size, or iterative reconstruction technique.    COMPARISON: MRI of the brain dated 9/24/2024. CT of the head  9/23/2024.    FINDINGS: Small area of asymmetric hypoattenuation involving the right  anterior paramedian aspect of the shalom (series 2 image 7), likely  representing the recent ischemic infarct seen on the MRI of 9/24/2024.  In retrospect, this also appears to have been present at least to some  degree on the head CT of 9/23/2024, although this area is partially  obscured by artifacts. No definite finding to suggest acute  intracranial hemorrhage or significant mass effect.    There is unchanged mild prominence of the size of the bilateral  lateral ventricles and the third ventricle, which may be due to ex  vacuo dilatation. There is mild generalized brain parenchymal volume  loss. Mild patchy nonspecific hypoattenuation in the cerebral white  matter, which may be due to chronic small vessel ischemic changes.    Visualized aspects of paranasal sinuses and mastoids are clear. The  calvarium appears intact.      Impression    IMPRESSION:  1. Findings in keeping with a subtle small recent ischemic infarct in  the right ventral shalom,  much better characterized on the prior MRI  exam. No acute intracranial hemorrhage or significant mass effect.  2. Brain atrophy and presumed chronic small vessel ischemic changes,  as described.    FORREST MAR MD         SYSTEM ID:  [id number]   XR Abdomen Port 1 View    Narrative    EXAM: XR ABDOMEN PORT 1 VIEW  LOCATION: [facility]  DATE: 2024    INDICATION: emesis  COMPARISON: None.      Impression    IMPRESSION: Normal bowel gas pattern. Advanced vascular calcification.   Echocardiogram Complete - For age > 60 yrs     Value    LVEF  60%    Narrative    [id number]  [id number]  [id number]  [id number]^MAGDY^DULCE     [facility]  Echocardiography Laboratory  [address] [place] [id number]     Name: PREETI PUCKETT  MRN: [id number]  : 1955  Study Date: 2024 02:17 PM  Age: 69 yrs  Gender: Male  Patient Location: [facility]  Reason For Study: Cerebrovascular Incident  Ordering Physician: DULCE CURRAN  Performed By: Samara Eldridge     BSA: 1.9 m2  Height: 68 in  Weight: 162 lb  HR: 59  BP: 181/99 mmHg  ______________________________________________________________________________  Procedure  Complete Portable Bubble Echo Adult. Optison (NDC #0928-0340) given  intravenously.  ______________________________________________________________________________  Interpretation Summary     1. The left ventricle is normal in structure, function and size. The visual  ejection fraction is estimated at 60%.  2. The right ventricle is normal in structure, function and size.  3. There is no atrial shunt seen. A contrast injection (Bubble Study) was  performed that was negative for flow across the interatrial septum.  4. No valve disease.     No previous echo for comparison.  ______________________________________________________________________________  Left Ventricle  The left ventricle is normal in structure, function and size. There is normal  left ventricular wall thickness.  The visual ejection fraction is estimated at  60%. Left ventricular diastolic function is normal. Normal left ventricular  wall motion.     Right Ventricle  The right ventricle is normal in structure, function and size.     Atria  Normal left atrial size. Right atrial size is normal. There is no atrial shunt  seen. A contrast injection (Bubble Study) was performed that was negative for  flow across the interatrial septum.     Mitral Valve  The mitral valve is normal in structure and function.     Tricuspid Valve  No tricuspid regurgitation.     Aortic Valve  The aortic valve is normal in structure and function.     Pulmonic Valve  The pulmonic valve is normal in structure and function.     Vessels  Normal ascending, transverse (arch), and descending aorta. The inferior vena  cava was normal in size with preserved respiratory variability.     Pericardium  There is no pericardial effusion.     Rhythm  Sinus rhythm was noted.  ______________________________________________________________________________  MMode/2D Measurements & Calculations     IVSd: 0.85 cm  LVIDd: 4.7 cm  LVIDs: 3.3 cm  LVPWd: 0.88 cm  FS: 30.2 %  LV mass(C)d: 135.7 grams  LV mass(C)dI: 72.6 grams/m2  Ao root diam: 3.2 cm  LA dimension: 3.3 cm  asc Aorta Diam: 3.2 cm  LA/Ao: 1.0  LVOT diam: 2.0 cm  LVOT area: 3.3 cm2  Ao root diam index Ht(cm/m): 1.9  Ao root diam index BSA (cm/m2): 1.7  Asc Ao diam index BSA (cm/m2): 1.7  Asc Ao diam index Ht(cm/m): 1.9  LA Volume (BP): 56.9 ml     LA Volume Index (BP): 30.4 ml/m2  RV Base: 2.6 cm  RWT: 0.37  TAPSE: 1.8 cm     Doppler Measurements & Calculations  MV E max earl: 72.4 cm/sec  MV A max earl: 68.6 cm/sec  MV E/A: 1.1  MV dec time: 0.21 sec  PA acc time: 0.10 sec  E/E' avg: 10.3  Lateral E/e': 6.8  Medial E/e': 13.9  RV S Earl: 10.4 cm/sec     ______________________________________________________________________________  Report approved by: Anil Mendiola 09/24/2024 03:41 PM             Discharge  Medications   Discharge Medication List as of 9/27/2024 10:45 AM        START taking these medications    Details   apixaban ANTICOAGULANT (ELIQUIS) 5 MG tablet Take 2 tablets (10 mg) by mouth 2 times daily for 6 days, THEN 1 tablet (5 mg) 2 times daily for 24 days., R-2, Transitional      atorvastatin (LIPITOR) 80 MG tablet Take 1 tablet (80 mg) by mouth every evening., Disp-90 tablet, R-0, Transitional      insulin glargine (LANTUS PEN) 100 UNIT/ML pen Inject 15 Units subcutaneously every morning (before breakfast)., Disp-15 mL, R-0, TransitionalIf Lantus is not covered by insurance, may substitute Basaglar or Semglee or other insulin glargine product per insurance preference at same dose and frequenc y.        losartan (COZAAR) 50 MG tablet Take 0.5 tablets (25 mg) by mouth daily., Transitional      metFORMIN (GLUCOPHAGE XR) 500 MG 24 hr tablet Take 1 tablet (500 mg) by mouth daily (with dinner)., Disp-90 tablet, R-0, Transitional      polyethylene glycol (MIRALAX) 17 GM/Dose powder Take 17 g by mouth daily., Transitional           STOP taking these medications       ASPIRIN 81 PO Comments:   Reason for Stopping:         metFORMIN (GLUCOPHAGE) 500 MG tablet Comments:   Reason for Stopping:             Allergies   No Known Allergies

## 2024-09-27 NOTE — PROGRESS NOTES
Initial insulin administration teaching provided by RN Giancarlo albarran to patient. Patient was able to properly describe and demonstrate proper administration technique at time of teaching. All questions answered. Additional education to be provided at discharge today.

## 2024-09-27 NOTE — DISCHARGE INSTRUCTIONS
Your risk factors for stroke or TIA (transient ischemic attack):     Your Risk Factors Your Results Goals   [] High blood pressure BP: 125/73 (09/27/24 0730) Less than 120/80   [x] Cholesterol          Total 9/23/2024: 219 mg/dL   Less than 150    Triglycerides   9/23/2024: 79 mg/dL Less than 150    LDL 9/23/2024: 148 mg/dL    Less than 70    HDL 9/23/2024: 55 mg/dL         Greater than 40 (men)  Greater than 50 (women)   [x] Diabetes                A1C 9/23/2024: 12.8 % Less than 5.7   [] Atrial fibrillation Atrial fibrillation noted on cardiac monitoring Manage per physician orders   [] Smoking/tobacco use   Tobacco Use      Smoking status: Never      Smokeless tobacco: Never   Quit smoking and tobacco   [] Overweight Body mass index is 24.74 kg/m .  Less than 25     Other risk factors include: carotid (neck) artery disease, other heart diseases, prior stroke or TIA, poor diet, lack of exercise, and excessive alcohol consumption.     [x] Written stroke educational materials given to patient including:   - Learning about BE FAST: Stroke Warning Signs and Learning about Risk Factors for Stroke (Healthwise)   - Understanding Stroke: Key Resources After a Stroke (FOD #316296)       Know the warning signs and symptoms of stroke: BE FAST     B = Balance loss   E = Eyesight changes   F = Facial droop or numbness   A = Arm or leg weakness   S = Speech difficulty, slurred speech   T = Time to call 911 for help

## 2024-09-27 NOTE — PLAN OF CARE
Shift 1230 to 1530:    Goal Outcome Evaluation:      Plan of Care Reviewed With: patient    Overall Patient Progress: improving    Outcome Evaluation: Pt arrived to unit at 1230    Admission Note    Reason for admission: Ischemic stroke  Primary team notified of pt arrival.  Admitted from: [facility]  Via: family transport  Accompanied by: friend, left after pt arrived  Belongings: Placed in reach of pt; valuables sent home with family  Admission Required Doc Completed: yes  Teaching: Orientation to unit and call light- call light within reach, use of console, meal times, when to call for the RN, and enforced importance of safety.  IV Access: none  Telemetry: no  Ht./Wt.: Weight TBD  Code Status verified on armband: Yes  2 RN Skin Assessment Completed with: Not done yet, oncoming RN aware, pt aware and willing.    Pt status:     Temp:  [97.3  F (36.3  C)] 97.3  F (36.3  C)  Pulse:  [73] 73  Resp:  [18] 18  BP: (122)/(73) 122/73  SpO2:  [98 %] 98 %    Pt A&Ox4, forgetful at times per report, denies SOB, CP, nausea, vomiting, diarrhea, constipation, numbness, tingling, and pain. Pt on easy to chew lvl7/combination regular diet, takes medications x1 at a time with thin liquids. BG checks QID, sliding scale insulin. Pt continent of B&B, LBM 9/26. Ax1 walker/GB for transfers. Visible skin intact except for bruise on RUE from previous PIV, LLE DVT per report, MD aware and following; LLE skin dusky, +2 edema. Pt has call light in reach, calls appropriately, and has no unanswered questions or concerns at end of shift. Continue POC.

## 2024-09-27 NOTE — PLAN OF CARE
"Speech Language Therapy Discharge Summary    Reason for therapy discharge:    Discharged to acute rehabilitation facility.    Progress towards therapy goal(s). See goals on Care Plan in Epic electronic health record for goal details.  Goals not met.  Barriers to achieving goals:   discharge from facility.    Therapy recommendation(s):    Continued therapy is recommended.  Rationale/Recommendations:  Dysphagia tx and speech/language eval/tx as indicated.    \"Recommend: Continue on the IDDSI level 7 easy to chew and thin liquids. 2. Upright, small single sips, check for oral residue and alternate liquids/solids. Defer speech/lang evaluation to the next level of care pending discharge timing. Patient is very motivated to return to baseline and has excellent participation.\"     *Pt not seen by discharging therapist on this date, note written based on previous treating therapist's notes and recommendations     "

## 2024-09-27 NOTE — PROGRESS NOTES
Care Management Discharge Note    Discharge Date: 09/27/2024       Discharge Disposition: Home, Outpatient Rehab (PT, OT, SLP, Cardiac or Pulmonary)    Discharge Services:      Discharge DME:      Discharge Transportation: family or friend will provide    Private pay costs discussed: Not applicable    Does the patient's insurance plan have a 3 day qualifying hospital stay waiver?  No    PAS Confirmation Code:    Patient/family educated on Medicare website which has current facility and service quality ratings:      Education Provided on the Discharge Plan:    Persons Notified of Discharge Plans: Patient, HUC, bedside nurse  Patient/Family in Agreement with the Plan: yes    Handoff Referral Completed: No, handoff not indicated or clinically appropriate    Additional Information:  Writer confirmed patients transport for today. Writer provided patient with address of Acute Rehab and phone number to call when arrived. Patient had no other questions.     MIRIAM ALBERT  [facility]  INPATIENT CARE COORDINATION

## 2024-09-27 NOTE — PLAN OF CARE
Goal Outcome Evaluation:      Plan of Care Reviewed With: patient    Overall Patient Progress: improvingOverall Patient Progress: improving    Outcome Evaluation: Patient is alert and oriented x4. Can make needs known and uses call light appropriately. Patient is continent of bowel and bladder. Skin assessment done, is intact with +2 edema to the left leg. VSS on RA. One assist with gait belt and walker.Patient on eliquis for LLE DVT. Takes medications whole with water. Last bm 9/26 but patient given senna this evening. No PIV or drains. QID blood sugar checks. Bed alarm on and call light within reach. Will continue with POC.

## 2024-09-28 ENCOUNTER — APPOINTMENT (OUTPATIENT)
Dept: SPEECH THERAPY | Facility: CLINIC | Age: 69
DRG: 057 | End: 2024-09-28
Attending: PHYSICAL MEDICINE & REHABILITATION
Payer: MEDICARE

## 2024-09-28 ENCOUNTER — APPOINTMENT (OUTPATIENT)
Dept: PHYSICAL THERAPY | Facility: CLINIC | Age: 69
DRG: 057 | End: 2024-09-28
Attending: PHYSICAL MEDICINE & REHABILITATION
Payer: MEDICARE

## 2024-09-28 ENCOUNTER — APPOINTMENT (OUTPATIENT)
Dept: OCCUPATIONAL THERAPY | Facility: CLINIC | Age: 69
DRG: 057 | End: 2024-09-28
Attending: PHYSICAL MEDICINE & REHABILITATION
Payer: MEDICARE

## 2024-09-28 LAB
ANION GAP SERPL CALCULATED.3IONS-SCNC: 12 MMOL/L (ref 7–15)
BASOPHILS # BLD AUTO: 0 10E3/UL (ref 0–0.2)
BASOPHILS NFR BLD AUTO: 0 %
BUN SERPL-MCNC: 25 MG/DL (ref 8–23)
CALCIUM SERPL-MCNC: 9.3 MG/DL (ref 8.8–10.4)
CHLORIDE SERPL-SCNC: 105 MMOL/L (ref 98–107)
CREAT SERPL-MCNC: 0.98 MG/DL (ref 0.67–1.17)
EGFRCR SERPLBLD CKD-EPI 2021: 85 ML/MIN/1.73M2
EOSINOPHIL # BLD AUTO: 0.3 10E3/UL (ref 0–0.7)
EOSINOPHIL NFR BLD AUTO: 5 %
ERYTHROCYTE [DISTWIDTH] IN BLOOD BY AUTOMATED COUNT: 11.8 % (ref 10–15)
GLUCOSE BLDC GLUCOMTR-MCNC: 107 MG/DL (ref 70–99)
GLUCOSE BLDC GLUCOMTR-MCNC: 113 MG/DL (ref 70–99)
GLUCOSE BLDC GLUCOMTR-MCNC: 114 MG/DL (ref 70–99)
GLUCOSE BLDC GLUCOMTR-MCNC: 143 MG/DL (ref 70–99)
GLUCOSE BLDC GLUCOMTR-MCNC: 158 MG/DL (ref 70–99)
GLUCOSE SERPL-MCNC: 120 MG/DL (ref 70–99)
HCO3 SERPL-SCNC: 22 MMOL/L (ref 22–29)
HCT VFR BLD AUTO: 37.8 % (ref 40–53)
HGB BLD-MCNC: 12.8 G/DL (ref 13.3–17.7)
IMM GRANULOCYTES # BLD: 0 10E3/UL
IMM GRANULOCYTES NFR BLD: 0 %
LYMPHOCYTES # BLD AUTO: 1.9 10E3/UL (ref 0.8–5.3)
LYMPHOCYTES NFR BLD AUTO: 30 %
MCH RBC QN AUTO: 31.5 PG (ref 26.5–33)
MCHC RBC AUTO-ENTMCNC: 33.9 G/DL (ref 31.5–36.5)
MCV RBC AUTO: 93 FL (ref 78–100)
MONOCYTES # BLD AUTO: 0.5 10E3/UL (ref 0–1.3)
MONOCYTES NFR BLD AUTO: 7 %
NEUTROPHILS # BLD AUTO: 3.7 10E3/UL (ref 1.6–8.3)
NEUTROPHILS NFR BLD AUTO: 58 %
NRBC # BLD AUTO: 0 10E3/UL
NRBC BLD AUTO-RTO: 0 /100
PLATELET # BLD AUTO: 211 10E3/UL (ref 150–450)
POTASSIUM SERPL-SCNC: 4.1 MMOL/L (ref 3.4–5.3)
RBC # BLD AUTO: 4.06 10E6/UL (ref 4.4–5.9)
SODIUM SERPL-SCNC: 139 MMOL/L (ref 135–145)
WBC # BLD AUTO: 6.4 10E3/UL (ref 4–11)

## 2024-09-28 PROCEDURE — 99231 SBSQ HOSP IP/OBS SF/LOW 25: CPT | Mod: GC

## 2024-09-28 PROCEDURE — 97161 PT EVAL LOW COMPLEX 20 MIN: CPT | Mod: GP | Performed by: PHYSICAL THERAPIST

## 2024-09-28 PROCEDURE — 36415 COLL VENOUS BLD VENIPUNCTURE: CPT

## 2024-09-28 PROCEDURE — 97530 THERAPEUTIC ACTIVITIES: CPT | Mod: GP | Performed by: PHYSICAL THERAPIST

## 2024-09-28 PROCEDURE — 80048 BASIC METABOLIC PNL TOTAL CA: CPT

## 2024-09-28 PROCEDURE — 85004 AUTOMATED DIFF WBC COUNT: CPT

## 2024-09-28 PROCEDURE — 97535 SELF CARE MNGMENT TRAINING: CPT | Mod: GO | Performed by: OCCUPATIONAL THERAPIST

## 2024-09-28 PROCEDURE — 97110 THERAPEUTIC EXERCISES: CPT | Mod: GP | Performed by: PHYSICAL THERAPIST

## 2024-09-28 PROCEDURE — 97165 OT EVAL LOW COMPLEX 30 MIN: CPT | Mod: GO | Performed by: OCCUPATIONAL THERAPIST

## 2024-09-28 PROCEDURE — 97116 GAIT TRAINING THERAPY: CPT | Mod: GP | Performed by: PHYSICAL THERAPIST

## 2024-09-28 PROCEDURE — 92610 EVALUATE SWALLOWING FUNCTION: CPT | Mod: GN

## 2024-09-28 PROCEDURE — 128N000003 HC R&B REHAB

## 2024-09-28 PROCEDURE — 250N000012 HC RX MED GY IP 250 OP 636 PS 637

## 2024-09-28 PROCEDURE — 250N000013 HC RX MED GY IP 250 OP 250 PS 637

## 2024-09-28 RX ADMIN — APIXABAN 10 MG: 5 TABLET, FILM COATED ORAL at 20:34

## 2024-09-28 RX ADMIN — SENNOSIDES AND DOCUSATE SODIUM 2 TABLET: 50; 8.6 TABLET ORAL at 20:34

## 2024-09-28 RX ADMIN — METFORMIN HYDROCHLORIDE 500 MG: 500 TABLET, EXTENDED RELEASE ORAL at 17:04

## 2024-09-28 RX ADMIN — INSULIN GLARGINE 15 UNITS: 100 INJECTION, SOLUTION SUBCUTANEOUS at 09:20

## 2024-09-28 RX ADMIN — INSULIN ASPART 1 UNITS: 100 INJECTION, SOLUTION INTRAVENOUS; SUBCUTANEOUS at 17:02

## 2024-09-28 RX ADMIN — LOSARTAN POTASSIUM 25 MG: 25 TABLET, FILM COATED ORAL at 08:23

## 2024-09-28 RX ADMIN — INSULIN ASPART 1 UNITS: 100 INJECTION, SOLUTION INTRAVENOUS; SUBCUTANEOUS at 12:48

## 2024-09-28 RX ADMIN — ATORVASTATIN CALCIUM 80 MG: 80 TABLET, FILM COATED ORAL at 20:34

## 2024-09-28 RX ADMIN — APIXABAN 10 MG: 5 TABLET, FILM COATED ORAL at 08:24

## 2024-09-28 ASSESSMENT — ACTIVITIES OF DAILY LIVING (ADL)
ADLS_ACUITY_SCORE: 26
ADLS_ACUITY_SCORE: 26
ADLS_ACUITY_SCORE: 25
ADLS_ACUITY_SCORE: 26
ADLS_ACUITY_SCORE: 26
ADLS_ACUITY_SCORE: 25
BADLS,_PREVIOUS_FUNCTIONAL_LEVEL: INDEPENDENT
ADLS_ACUITY_SCORE: 26
ADLS_ACUITY_SCORE: 25
ADLS_ACUITY_SCORE: 26
ADLS_ACUITY_SCORE: 25
BADLS,_PREVIOUS_FUNCTIONAL_LEVEL: INDEPENDENT
IADLS,_PREVIOUS_FUNCTIONAL_LEVEL: INDEPENDENT
ADLS_ACUITY_SCORE: 25
ADLS_ACUITY_SCORE: 25
IADLS,_PREVIOUS_FUNCTIONAL_LEVEL: INDEPENDENT
ADLS_ACUITY_SCORE: 26
PREVIOUS_RESPONSIBILITIES: MEAL PREP;HOUSEKEEPING;LAUNDRY;SHOPPING;MEDICATION MANAGEMENT;FINANCES
ADLS_ACUITY_SCORE: 26

## 2024-09-28 NOTE — PROGRESS NOTES
09/28/24 1100   Appointment Info   Signing Clinician's Name / Credentials (OT) momo menjivar, otr/l   Living Environment   People in Home sibling(s);significant other   Current Living Arrangements house   Home Accessibility stairs to enter home   Number of Stairs, Main Entrance 3   Stair Railings, Main Entrance railing on left side (ascending)   Transportation Anticipated family or friend will provide   Living Environment Comments Pt lives in a rambler with his siblings, he has a girlfriend who is over frequently as well. Pt states someone is there all the time if he needs assist. 3 VALERIE with all needs met on main level. BR set up: tub/shower, gb, shower chair; standard toilet w gb near by; sleeps in a regular bed at home   Self-Care   Usual Activity Tolerance good   Current Activity Tolerance moderate   Equipment Currently Used at Home cane, straight;grab bar, toilet;grab bar, tub/shower;shower chair;walker, standard;wheelchair, manual   Fall history within last six months yes   Number of times patient has fallen within last six months 3   Activity/Exercise/Self-Care Comment Pt reports being IND at baseline in all adls and mobility without an AD   Instrumental Activities of Daily Living (IADL)   Previous Responsibilities meal prep;housekeeping;laundry;shopping;medication management;finances   IADL Comments Pt IND in all IADLs except driving, does meds w/o pill box   Previous Level of Function/Home Environm   Bathing, Previous Functional Level independent   Grooming, Previous Functional Level independent   Dressing, Previous Functional Level independent   Eating/Feeding, Previous Functional Level independent   Toileting, Previous Functional Level independent   BADLs, Previous Functional Level independent   IADLs, Previous Functional Level independent   Bed Mobility, Previous Functional Level independent   Transfers, Previous Functional Level independent   Household Ambulation, Previous Functional Level  independent   Stairs, Previous Functional Level independent   Community Ambulation, Previous Functional Level independent   Functional Cognition, Previous Functional Level pt reports no deficits at baseline   General Information   Onset of Illness/Injury or Date of Surgery 09/23/24   Referring Physician Dr. Jb Castelan, DO   Patient/Family Therapy Goal Statement (OT) To go home when able   Additional Occupational Profile Info/Pertinent History of Current Problem 69 year old male admitted on 9/23/2024. He presents to the emergency department for evaluation of left-sided facial droop and unsteady gait over the past 3 days. 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 Left lower extremity ultrasound noted to have DVT in in Left common femoral, profunda femoral, femoral, popliteal, and peroneal veins. Started on IV heparin drip per neurology recommendation on 9/24/2024.  Pt switched to oral Eliquis. 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 Hospital course was complicated by vasovagal syncope suspected due to constipation; pt was started on miralax and lactulose.    Key findings: Alert and pleasant. Has left sided impairment with coordination and function. Has LLE edema. Charcot left foot, absent proprioception left foot.   Existing Precautions/Restrictions fall  (charcot at baseline on L foot and has brace for it that is not here)   Left Upper Extremity (Weight-bearing Status) full weight-bearing (FWB)   Right Upper Extremity (Weight-bearing Status) full weight-bearing (FWB)   Left Lower Extremity (Weight-bearing Status) full weight-bearing (FWB)   Right Lower Extremity (Weight-bearing Status) full weight-bearing (FWB)   General Observations and Info Pt is alert,  oriented, and able to direct his own care. Needs to use BR upon arrival.   Cognitive Status Examination   Cognitive Status Comments Pt is alert, oriented, and able to direct his own care; can be forgetful at times; 24/30 on Mini Ace, placing him in normal range   Cognitive Screens/Assessments   Cognitive Assessments Completed M-ACE   Mini-Addenbrooke s Cognitive Examination (M-ACE) Total Score (out of 30)  24/30   M-ACE Norms A score of 21 or less indicates suspected cognitive impairment   M-ACE Domains Assessed Cognitive Screen for Attention, Memory, Fluency,  Visuospatial Abilities   M-ACE Interpretation Pt scoring in normal range, however does appear to have some memory deficits; impaired speech/slurred speech though improving   Visual Perception   Impact of Vision Impairment on Function (Vision) wears readers at baseline; per screening no deficits in areas of smooth pursuits, fixation, accomodation, saccades, or field cut   Sensory   Sensory Comments BUE sensation intact   Range of Motion Comprehensive   Comment, General Range of Motion RUE: WNL; LUE: slight deficit with shoulder motion past 90 degrees, however pt states he has some shoulder deficits/arthritis at baseline in L   Strength Comprehensive (MMT)   Comment, General Manual Muscle Testing (MMT) Assessment RUE: 5/5; LUE: 4/5 generally   Coordination   Coordination Comments FM and GM coordination is wnl for BUE for functional ADLs   Clinical Impression   Criteria for Skilled Therapeutic Interventions Met (OT) Yes, treatment indicated   OT Diagnosis impaired adls, iadls, functional mobility   OT Problem List-Impairments impacting ADL balance;mobility   ADL comments/analysis impaired   Assessment of Occupational Performance 1-3 Performance Deficits   Planned Therapy Interventions (OT) ADL retraining;IADL retraining;balance training;E-stim;fine motor coordination training;groups;neuromuscular re-education;motor coordination training;strengthening;transfer  training;home program guidelines;progressive activity/exercise;risk factor education   Clinical Decision Making Complexity (OT) problem focused assessment/low complexity   Risk & Benefits of therapy have been explained evaluation/treatment results reviewed   Clinical Impression Comments Pt is a 68 y/o male admitted to ARU s/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 Pt also having PMH of left lower extremity DVT and was started on IV heparin drip 9/24. Pt reports at baseline having charcot on LLE and usually wears a boot/brace however it is not here. Functionally, at baseline pt is independent in all mobility and adls/iadls. Now, slightly below baseline with needing CGA with functional mobility and all basic ADLs. Per cog screening, pt scoring in normative range on M-ACE. Pt having deficits in LUE strrength and will continue to assess FM/GM coordination. Anticipate short stay of ~1 week prior to discharge to home with OP therapy.   OT Total Evaluation Time   OT Eval, Low Complexity Minutes (71767) 30   OT Goals   Therapy Frequency (OT) 6 times/week   OT Predicted Duration/Target Date for Goal Attainment 10/03/24   OT Goals Hygiene/Grooming;Upper Body Dressing;Lower Body Dressing;Upper Body Bathing;Lower Body Bathing;Bed Mobility;Transfers;Toilet Transfer/Toileting;Meal Preparation;Home Management   OT: Hygiene/Grooming independent   OT: Upper Body Dressing Independent   OT: Lower Body Dressing Independent   OT: Upper Body Bathing Modified independent   OT: Lower Body Bathing Modified independent   OT: Bed Mobility Independent   OT: Transfer Modified independent   OT: Toilet Transfer/Toileting Modified independent   OT: Meal Preparation Modified independent   OT: Home Management Modified independent   Self-Care/Home Management   Self-Care/Home Mgmt/ADL, Compensatory, Meal Prep Minutes (40704) 45   Treatment Detail/Skilled Intervention please see ggs for welcome window eval   OT Discharge Planning   OT Plan GG shower-pt can walk  to shower w walker and sit on bench; assess FM and GM using 9 hole/block and box if time, dynavision, tub tx, iadls   Total Session Time   Timed Code Treatment Minutes 45   Total Session Time (sum of timed and untimed services) 75   Post Acute Settings Only   What unit is patient on? Acute Rehab   OT - Acute Rehab Center Time   Individual Time (minutes) - OT 75   ARC Total Session Time (minutes) - OT 75   ARC Daily Total Session Time   OT ARC Daily Total Session Time 75   ARC Daily Rehab Total Minutes 75   Eating   Completely independent with self-feeding yes   Oral Hygiene   Describe performance CGA standing at sink w fww   Grooming (except oral cares)   Grooming Comment CGA standing at sink w fww to wash face and hands   Upper Body Dressing   Describe performance SBA seated in chair to shaina/doff overhead shirt   Lower Body Dressing (Pants/Undergarments)   Describe performance CGA when standing to pull up/down shorts   Lower Body Dressing putting on/taking off footwear   Describe performance SBA seated in chair using figure 4 to [facility] hospital socks   Toilet Hygiene   Describe performance CGA standing from HH toilet to complete pericare   Toilet Transfer   Describe performance CGA on/off HH toilet using fww   Chair/bed-to-chair Transfer   Describe performance CGA on/off chair using fww

## 2024-09-28 NOTE — PROGRESS NOTES
Discharge Planner Post-Acute Rehab PT:     Discharge Plan: Home, lives with brother and sister. 3 VALERIE, rail    Precautions: falls, seizures.     Current Status:  Bed Mobility: sba   Transfer: cga/Janice, fww  Gait: cga , fww 180 ft.   Stairs: cga, 2 HR x 12 (6 inch )   Balance: Pt can sit EOB Hali, needs assistive device and cga with gait.     Outcome Measures:   Maldonado-  FGA-      Assessment:  Pt is a 68 y/o male s/p stroke to R ariel shalom with L facial droop, mild L hemiparesis, impaired coordination and balance. Pt was previously I with his mobility and would walk his dog about 2 blocks PTA. ELOS - 1 week, OP PT likely at discharge.     Other Barriers to Discharge (DME, Family Training, etc):   - pt states he will have family members or his girlfriend able to be present at home when he gets home.

## 2024-09-28 NOTE — PROGRESS NOTES
[facility], [place]   Physical Medicine and Rehabilitation Daily Note           Assessment and Plan of Care:   Francisco J Felipe is a 69 year old right hand dominant male with past medical history of HTN, T2DM c/b diabetic foot ulcerations and left charcot foot, who initially presented to [facility] on 9/23 with a 3 day history of left sided facial droop, and unsteady gait. Stroke workup revealed a small acute infarct of the right hemipons. He was subsequently admitted to the  ARU on 9/27/2024 for ongoing rehabilitation. Deficits include impairments to mobility, ability to do ADLs, balance, coordination, cognition, processing, swallowing. Patient will require and benefit from PT, OT, and SLP services as well as all of the ancillary services offered in the inpatient rehab setting     --Vitals stable.   --Labs:  --BUN slightly elevated at 25 compared to 16.4 on 9/26     --Encouraged oral intake. Recheck on Monday 9/30   --Hgb/hematocrit stable  --Continue ongoing medical management.  --Continue therapies and plan of care.             Interval history:   Patient seen and examined at bedside. Per nursing report, no acute events overnight. Today, patient states that he is doing well. He had a disruptive night of sleep for his first night. Encouraged him that the nights will get more restful as he settles into rehab. He has no specific acute concerns or complaints. Answered a lot of logistical questions regarding what to expect while at ARU and the discharge planning process.  Denies fever, chills, CP, SOB, N/V, abdominal pain, new pain or weakness/numbness/tingling. Last BM 9/28    The patient will be evaluated by interdisciplinary therapy team today.           Physical Exam:   VS:   Vitals:    09/27/24 1722 09/27/24 1949 09/28/24 0210 09/28/24 0640   BP:  132/75 130/77 119/64   BP Location:  Left arm Right arm Right arm   Pulse:  73 65 68   Resp:  18 16 16   Temp:  97.3  F (36.3  C) 98  F  (36.7  C) 97.7  F (36.5  C)   TempSrc:  Oral Oral Oral   SpO2:  97% 99% 97%   Weight: 73.3 kg (161 lb 11.2 oz)      Height:         Gen: NAD, resting comfortably in bed  Heart: RRR, no murmurs  Lungs: breathing unlabored on room air, lungs CTA B  Abd: soft and non-tender  Ext: no edema in BLE, no calf tenderness  MSK/neuro: Alert, speech fluent, moves all four extremities volitionally.          Data:   Scheduled meds  Current Facility-Administered Medications   Medication Dose Route Frequency Provider Last Rate Last Admin    apixaban ANTICOAGULANT (ELIQUIS) tablet 10 mg  10 mg Oral BID Hira Vasquez MD   10 mg at 09/27/24 2005    Followed by    [START ON 10/2/2024] apixaban ANTICOAGULANT (ELIQUIS) tablet 5 mg  5 mg Oral BID Hira Vasquez MD        atorvastatin (LIPITOR) tablet 80 mg  80 mg Oral QPM Hira Vasquez MD   80 mg at 09/27/24 2005    insulin aspart (NovoLOG) injection (RAPID ACTING)  1-7 Units Subcutaneous TID AC Hira Vasquez MD   2 Units at 09/27/24 1719    insulin aspart (NovoLOG) injection (RAPID ACTING)  1-5 Units Subcutaneous At Bedtime Hira Vasquez MD        insulin glargine (LANTUS PEN) injection 15 Units  15 Units Subcutaneous QAM AC Hira Vasquez MD        losartan (COZAAR) tablet 25 mg  25 mg Oral Daily Hira Vasquez MD        metFORMIN (GLUCOPHAGE XR) 24 hr tablet 500 mg  500 mg Oral Daily with supper Hira Vasquez MD   500 mg at 09/27/24 1718    polyethylene glycol (MIRALAX) Packet 17 g  17 g Oral Daily Hira Vasquez MD        senna-docusate (SENOKOT-S/PERICOLACE) 8.6-50 MG per tablet 2 tablet  2 tablet Oral BID Hira Vasquez MD   2 tablet at 09/27/24 2005       PRN meds:  Current Facility-Administered Medications   Medication Dose Route Frequency Provider Last Rate Last Admin    acetaminophen (TYLENOL) tablet 650 mg  650 mg Oral Q4H PRN Hira Vasquez MD        benzocaine-menthol (CHLORASEPTIC) 6-10 MG lozenge 1 lozenge  1 lozenge Buccal Q1H PRN Pedro  MD Hira        calcium carbonate (TUMS) chewable tablet 1,000 mg  1,000 mg Oral TID PRN Hira Vasquez MD        glucose gel 15-30 g  15-30 g Oral Q15 Min PRN Hira Vasquez MD        Or    dextrose 50 % injection 25-50 mL  25-50 mL Intravenous Q15 Min PRN Hira Vasquez MD        Or    glucagon injection 1 mg  1 mg Subcutaneous Q15 Min PRN Hira Vasquez MD        melatonin tablet 3 mg  3 mg Oral At Bedtime PRN Hira Vasquez MD        Patient is already receiving anticoagulation with heparin, enoxaparin (LOVENOX), warfarin (COUMADIN)  or other anticoagulant medication   Does not apply Continuous PRN Hira Vasquez MD             Seen and discussed with Dr. Newby, PM&R staff physician     Dontae Bailey DO  PGY3  Physical Medicine and Rehabilitation-[facility]  Pager: [phone]

## 2024-09-28 NOTE — PROGRESS NOTES
09/28/24 1308   Appointment Info   Signing Clinician's Name / Credentials (PT) Delilah Donaldson, PT, DPT   Living Environment   People in Home sibling(s);significant other   Current Living Arrangements house   Home Accessibility stairs to enter home   Number of Stairs, Main Entrance 3   Stair Railings, Main Entrance railing on left side (ascending)   Transportation Anticipated family or friend will provide   Living Environment Comments Pt lives in a rambler with his siblings, he has a girlfriend who is over frequently as well. Pt states someone is there all the time if he needs assist. 3 VALERIE with all needs met on main level. BR set up: tub/shower, gb, shower chair; standard toilet w gb near by; sleeps in a regular bed at home   Self-Care   Usual Activity Tolerance good   Current Activity Tolerance moderate   Regular Exercise Other (see comments)   Equipment Currently Used at Home cane, straight;grab bar, toilet;grab bar, tub/shower;shower chair;walker, standard;wheelchair, manual   Fall history within last six months yes   Number of times patient has fallen within last six months 3   Activity/Exercise/Self-Care Comment Pt reports being IND at baseline in all adls and mobility without an AD.  Pt states he can walk about 2 to 2 1/2 blocks, walks dog ([email or web address]), park near house.   Previous Level of Function/Home Environm   Bathing, Previous Functional Level independent   Grooming, Previous Functional Level independent   Dressing, Previous Functional Level independent   Eating/Feeding, Previous Functional Level independent   Toileting, Previous Functional Level independent   BADLs, Previous Functional Level independent   IADLs, Previous Functional Level independent   Bed Mobility, Previous Functional Level independent   Transfers, Previous Functional Level independent   Household Ambulation, Previous Functional Level independent   Stairs, Previous Functional Level independent   Community Ambulation, Previous Functional  Level independent   Functional Cognition, Previous Functional Level pt reports no deficits at baseline   Previous Level of Function Pt amb without ad PTA.   General Information   Onset of Illness/Injury or Date of Surgery 09/23/24   Referring Physician Dr Castelan/ Hira Vasquez, resident.   Patient/Family Therapy Goals Statement (PT) Get strength better.   Pertinent History of Current Problem (include personal factors and/or comorbidities that impact the POC) 69 year old male admitted on 9/23/2024. He presents to the emergency department for evaluation of left-sided facial droop and unsteady gait over the past 3 days. 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 Left lower extremity ultrasound noted to have DVT in in Left common femoral, profunda femoral, femoral, popliteal, and peroneal veins. Started on IV heparin drip per neurology recommendation on 9/24/2024.  Pt switched to oral Eliquis. 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 Hospital course was complicated by vasovagal syncope suspected due to constipation; pt was started on miralax and lactulose.   Existing Precautions/Restrictions seizures;fall   General Observations L facial droop.   Cognition   Affect/Mental Status (Cognition) WNL   Orientation Status (Cognition) oriented x 4   Follows Commands (Cognition) WNL   Cognitive Status Comments PT: pt able to state details of recent stroke and blood clot,   Pain Assessment   Patient Currently in Pain No   Integumentary/Edema   Integumentary/Edema Comments Pt with L charcot foot, has special boot LLE.   Range of Motion (ROM)   Range of Motion ROM is WFL   ROM Comment Pt with charcot foot deformity, flat arch LLE.  Decreased toe ROM for B feet.   Strength  (Manual Muscle Testing)   Strength (Manual Muscle Testing) strength is WFL   Strength Comments L hip flexor about 4-/5, and R 4+/5, otherwise 5/5 for knee ext, 4/5 for ankle DF, 3+/5 for PF  B LEs,   Balance   Balance other (describe)   Balance Comments PT: pt able to stand without UE support with sba x 30 seconds.  Pt unable to perform SLS in standing.  Pt amb initially with fww cga for balance, when trying amb without AD, pt reaching for rail in hallway. trial of cane with cga, see below.   Sensory Examination   Sensory Perception other (describe)   Sensory Perception Comments Pt 's chart notes pt with impaired proprioception L foot with charcot foot.  Pt intact to protective sensation B feet, tested with monofilament, and intact to light touch .   Coordination   Coordination Comments mildly impaired LLe   Muscle Tone   Muscle Tone no deficits were identified   Clinical Impression   Criteria for Skilled Therapeutic Intervention Yes, treatment indicated   PT Diagnosis (PT) PT:PT with impaired mobility, decreased coordination, balance.   Influenced by the following impairments PT: Pt with impaired activity tolerance, mild L hemiparesis, decreased standing balance, mild incoordination, L charcot foot with impaired proprioception and joint deformity.   Functional limitations due to impairments Pt with difficulty with transfers, gait and stairs, and unable to ambulate community distances at this time.   Clinical Presentation (PT Evaluation Complexity) stable   Clinical Presentation Rationale clinical judgment.   Clinical Decision Making (Complexity) low complexity   Planned Therapy Interventions (PT) balance training;bed mobility training;cryotherapy;gait training;home exercise program;patient/family education;ROM (range of motion);stair training;strengthening;stretching;transfer training;progressive activity/exercise;groups;neuromuscular re-education;motor coordination training;risk factor education;home program  guidelines   Risk & Benefits of therapy have been explained evaluation/treatment results reviewed;care plan/treatment goals reviewed;current/potential barriers reviewed;risks/benefits reviewed;participants voiced agreement with care plan;participants included;patient   Clinical Impression Comments Pt is a 68 y/o male s/p stroke to R ariel shalom with L facial droop, mild L hemiparesis, impaired coordination and balance.  Pt was previously I with his mobiiity and would walk his dog about 2 blocks PTA.  ELOS - 1 week, OP PT likely at discharge.   PT Total Evaluation Time   PT Eval, Low Complexity Minutes (16630) 22   Physical Therapy Goals   PT Frequency 6x/week   PT Predicted Duration/Target Date for Goal Attainment 10/03/24   PT: Bed Mobility Independent   PT: Transfers Independent   PT: Gait Modified independent;Straight cane;Greater than 200 feet   PT: Stairs Modified independent;3 stairs;Rail on left   PT: Goal 1 Pt able to perform a car tx with cane and supervision   PT: Goal 2 Pt able to perform fall recovery tx with furniture and sba.   PT: Goal 3 Pt able to state 3 fall prevention strategies after participating in fall prevention training fo rincreased safety at home.   PT: Goal 4 Pt able to perform HEP Hali for strengthening, balance for improved function at home.   Therapeutic Procedure/Exercise   Treatment Detail/Skilled Intervention PT: seated marching, laq  x 5 with dmeo, Aps x 10 prior to amb for LE warm up, see gg for amb   Therapeutic Activity   Treatment Detail/Skilled Intervention Discussed POC, goals. and asked pt's brother (here at end of session) to bring pt's boot for charcot L foot to help support foot.  Pt states he hardly wears it because it makes walking uneven, looks strange.   Gait Training   Treatment Detail/Skilled Intervention see gg   PT Discharge Planning   PT Plan PT: try pt's boot if family brought it for L charcot foot, FGA, Maldonado, gait with cane, balance drills.   Total Session Time    Total Session Time (sum of timed and untimed services) 22   Post Acute Settings Only   What unit is patient on? Acute Rehab   PT - Acute Rehab Center Time   Individual Time (minutes) - PT 60  (eval 22, gait 15, theract 12  therex 11)   Group Time (minutes) - PT 0   Concurrent Time (minutes) - PT 0   Co-Treatment Time (minutes) - PT 0   ARC Total Session Time (minutes) - PT 60   ARC Daily Total Session Time   PT ARC Daily Total Session Time 60   ARC Daily Rehab Total Minutes 195   Bowel   Functional Performance Continent and uses toilet in bathroom   Bladder   Functional Performance Continent and uses toilet in bathroom   Lower Body Dressing putting on/taking off footwear   Describe performance sba with set up   Sit to Lying   Comment set up of rails, sba, able to scoot up on his own.   Sit to Stand   Comment PT: uses UEs, cga from reg chair.   Walk 10 Feet   Comment cga, fww   Walk 50 Feet with Two Turns   Comment PT: cga, fww, 180 ft x 2 mildly unsteady, and initially seemed to not fully see objects on L, bumping into nustep with fww. cga for balance. Pt also amb in zhang without ad, butwants rail for support, amb with cane with cga ot Janice, slow pace about 50 ft.   Walk 150 Feet   Comment PT: cga, fww, 180 ft x 2  mildly unsteady, and initially seemed to not fully see objects on L, bumping into nustep with fww. cga for balance.   Pt also amb in zhang without ad, butwants rail for support, amb with cane with cga ot Janice, slow pace about 50 ft.   Walking 10 Feet on Uneven Surfaces   Comment cga, fww   Wheel 50 Feet with Two Turns   Reason if not Attempted Activity not applicable   Wheel 50 Feet with Two Turns CARE Score 9   Wheel 150 Feet   Reason if not Attempted Activity not applicable   Wheel 150 Feet CARE Score 9   1 Step (Curb)   Comment Janice, fww, cues for coming close to step before stepping up /down (6 inch)   4 Steps   Comment PT: Pt using B rails, step over step generally, but step to at times, x 12  stairs, effortful, but able, then seated rest break, cga for balance   12 Steps   Comment PT: Pt using B rails, step over step generally, but step to at times, x 12 stairs, effortful, but able, then seated rest break, cga for balance   Picking Up Object   Comment cga, no ad. moves slowly.   Car Transfer   Comment PT: uses fww, cga

## 2024-09-28 NOTE — PLAN OF CARE
Goal Outcome Evaluation:    Overall Patient Progress: no change    Outcome Evaluation: No change in Pt progress this shift.    Pt is alert and oriented. Continent of B&B. LBM 9/28. Continue with PVRs. Ax1 walker. Denied pain, SOB, CP, and n/t. BG was 107. VSS. Pt appeared asleep during safety checks. Cares clustered. Call light within reach and bed alarms on.

## 2024-09-28 NOTE — PHARMACY-MEDICATION REGIMEN REVIEW
Pharmacy Medication Regimen Review  Francisco J Felipe is a 69 year old male who is currently in the Acute Rehab Unit.    Assessment: All medications have an appropriate indications, durations and no unnecessary use was found    Plan:   Continue current medication regimen.     Attending provider will be sent this note for review.  If there are any emergent issues noted above, pharmacist will contact provider directly by phone.      Pharmacy will periodically review the resident's medication regimen for any PRN medications not administered in > 72 hours and discontinue them. The pharmacist will discuss gradual dose reductions of psychopharmacologic medications with interdisciplinary team on a regular basis.    Please contact pharmacy if the above does not answer specific medication questions/concerns.    Background:  A pharmacist has reviewed all medications and pertinent medical history today.  Medications were reviewed for appropriate use and any irregularities found are listed with recommendations.      Current Facility-Administered Medications:     acetaminophen (TYLENOL) tablet 650 mg, 650 mg, Oral, Q4H PRN, Hira Vasquez MD    apixaban ANTICOAGULANT (ELIQUIS) tablet 10 mg, 10 mg, Oral, BID, 10 mg at 09/28/24 0824 **FOLLOWED BY** [START ON 10/2/2024] apixaban ANTICOAGULANT (ELIQUIS) tablet 5 mg, 5 mg, Oral, BID, Hira Vasquez MD    atorvastatin (LIPITOR) tablet 80 mg, 80 mg, Oral, QPM, Hira Vasquez MD, 80 mg at 09/27/24 2005    benzocaine-menthol (CHLORASEPTIC) 6-10 MG lozenge 1 lozenge, 1 lozenge, Buccal, Q1H PRN, Hira Vasquez MD    calcium carbonate (TUMS) chewable tablet 1,000 mg, 1,000 mg, Oral, TID PRN, Hira Vasquez MD    glucose gel 15-30 g, 15-30 g, Oral, Q15 Min PRN **OR** dextrose 50 % injection 25-50 mL, 25-50 mL, Intravenous, Q15 Min PRN **OR** glucagon injection 1 mg, 1 mg, Subcutaneous, Q15 Min PRN, Hira Vasquez MD    insulin aspart (NovoLOG) injection (RAPID ACTING), 1-7 Units,  Subcutaneous, TID Pedro ASHTON Marcus, MD, 1 Units at 09/28/24 1248    insulin aspart (NovoLOG) injection (RAPID ACTING), 1-5 Units, Subcutaneous, At Bedtime, Hira Vasquez MD    insulin glargine (LANTUS PEN) injection 15 Units, 15 Units, Subcutaneous, QAM Pedro ASHTON Marcus, MD, 15 Units at 09/28/24 0920    losartan (COZAAR) tablet 25 mg, 25 mg, Oral, Daily, Hira Vasquez MD, 25 mg at 09/28/24 0823    melatonin tablet 3 mg, 3 mg, Oral, At Bedtime PRN, Hira Vasquez MD    metFORMIN (GLUCOPHAGE XR) 24 hr tablet 500 mg, 500 mg, Oral, Daily with supper, Hira Vasquez MD, 500 mg at 09/27/24 1718    Patient is already receiving anticoagulation with heparin, enoxaparin (LOVENOX), warfarin (COUMADIN)  or other anticoagulant medication, , Does not apply, Continuous PRN, Hira Vasquez MD    polyethylene glycol (MIRALAX) Packet 17 g, 17 g, Oral, Daily, Hira Vasquez MD    senna-docusate (SENOKOT-S/PERICOLACE) 8.6-50 MG per tablet 2 tablet, 2 tablet, Oral, BID, Hira Vasquez MD, 2 tablet at 09/27/24 2005  No current outpatient prescriptions on file.   PMH: HTN, T2DM c/b diabetic foot ulcers and charcot foot

## 2024-09-28 NOTE — PROGRESS NOTES
"   09/28/24 0918   Appointment Info   Signing Clinician's Name / Credentials (SLP) Darwin Lynn MA, CCC-SLP   General Information   Onset of Illness/Injury or Date of Surgery 09/23/24   Referring Physician Hira Vasquez MD   Pertinent History of Current Problem Per H&P report, \"Francisco J Felipe is a 69 year old male with pmh HTN, T2DM c/b diabetic foot ulcers and charcot foot, who presented to the [facility] emergency department on 9/23 with a 3 day history of left-sided facial droop and unsteady gait. Stroke workup was initiated and MRI brain showed acute CVA of the right Robb shalom. He also was noted to have Left transverse sigmoid and sigmoid sinus occlusion, without headache. 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 He was subsequently started on IV heparin drip per neurology recommendation on 9/24/2024.  He was switched to oral Eliquis on 9/27. Hospital course was further complicated by vasovagal syncope suspected due to constipation; pt was started on miralax and lactulose.\" SLP consult recieved order for evaluation and treatment as indicated.   General Observations Pt arrived on ARU with orders for easy to chew (7) and thin liquids (0). Pt follow by acute SLP for dysphagia, please see dysphagia history below for more details.   Type of Evaluation   Type of Evaluation Swallow Evaluation   Oral Motor   Oral Musculature anomalies present   Structural Abnormalities other (see comments)  (Pt presents with slight left side facial droop)   Mucosal Quality adequate   Dentition (Oral Motor)   Comment, Dentition (Oral Motor) Pt missing 2 bottom right molars. Pt reported that he had food getting stuck in this pocket where molars are missing, and required extended chewing time to " "remove food with lingual sweep.   Dentition (Oral Motor) natural dentition;other (see comments)   Facial Symmetry (Oral Motor)   Facial Symmetry (Oral Motor) left side impairment   Comment, Facial Symmetry (Oral Motor) slight left facial droop. Does not appear to be affecting speech or swallowing to a great degree. WFL.   Lip Function (Oral Motor)   Lip Range of Motion (Oral Motor) WNL   Lip Sensitivity (Oral Motor) not tested   Lip Strength (Oral Motor) WFL   Tongue Function (Oral Motor)   Tongue Coordination/Speed (Oral Motor) WNL   Tongue ROM (Oral Motor) WNL   Comment, Tongue Function (Oral Motor) Tongue function appeared WFL   Jaw Function (Oral Motor)   Jaw Function (Oral Motor) WNL   Vocal Quality/Secretion Management (Oral Motor)   Vocal Quality (Oral Motor) WFL   Secretion Management (Oral Motor) WNL   Comment, Vocal Quality/Secretion Management (Oral Motor) No signs of drooling or difficulty managing secretions. No obvious vocal quality changes when/after swallowing.   General Swallowing Observations   Past History of Dysphagia Acute SLP clinical swallow evaluation completed 09/24, easy to chew solids and thin liquids recommended. No objective imaging of swallow during hospitalization.   Comment, General Swallowing Observations Pt demonstrated slow mastication, requiring extra time to chew and swallow both solids trialed. Pt utilizing water from a straw to help wash down \"Dry\" food.   Current Diet/Method of Nutritional Intake (General Swallowing Observations, NIS) easy to chew (level 7);thin liquids (level 0)   Swallowing Evaluation Clinical swallow evaluation   Clinical Swallow Evaluation   Feeding Assistance set up only required   Clinical Swallow Evaluation Textures Trialed thin liquids;solid foods   Clinical Swallow Eval: Thin Liquid Texture Trial   Mode of Presentation, Thin Liquids straw;self-fed   Volume of Liquid or Food Presented Small sips, sips used to help wash food down.   Oral Phase of " Swallow WFL   Diagnostic Statement Pt demonstrated no overt s/s of aspiration when drinking thin liquids during bedside.   Clinical Swallow Evaluation: Solid Food Texture Trial   Mode of Presentation self-fed  (Easy to chew (7)- White bread with peanut butter spread. Regular- Cracker)   Volume Presented single bite of each, pt reported bread was dry   Oral Phase impaired mastication;residue in oral cavity;effortful AP movement   Pharyngeal Phase impaired   Diagnostic Statement Pt required extra mastication time to chew food, and swallowed down the single bite of bread with peanut butter after 3 swallows, with the help of sipping thin liquids, and using his tongue to sweep out any stuck/pocketed food. ~3-4 minutes to swallow the majority of the bite. Laryngeal movement appeard more effortful when eating food vs drinking.   Esophageal Phase of Swallow   Patient reports or presents with symptoms of esophageal dysphagia No   Esophageal comments No s/s of difficulty with esophageal phase during bedside evaluation.   Swallowing Recommendations   Diet Consistency Recommendations easy to chew (level 7);thin liquids (level 0)   Supervision Level for Intake patient independent   Mode of Delivery Recommendations bolus size, small;slow rate of intake   Recommended Feeding/Eating Techniques (Swallow Eval) maintain upright sitting position for eating   Instrumental Assessment Recommendations instrumental evaluation not recommended at this time   Comment, Swallowing Recommendations No video swallow has been completed as of yet, SLP to monitor pt for signs of infection d/t aspiration, VFFS may be indicated if signs arise.   General Therapy Interventions   Planned Therapy Interventions Dysphagia Treatment   Dysphagia treatment Instruction of safe swallow strategies;Modified diet education   Intervention Comments Continue to assess pt with his swallow of Regular/Thin to determine if switching diet orders is indcated.   Clinical  Impression   Criteria for Skilled Therapeutic Interventions Met (SLP Eval) Yes, treatment indicated   SLP Diagnosis Mild oral/pharyngeal dysphagia.   Activity Limitations Related to Problem List (SLP) None from SLP   Risks & Benefits of therapy have been explained evaluation/treatment results reviewed;care plan/treatment goals reviewed;participants voiced agreement with care plan;participants included;patient   Clinical Impression Comments SLP: Pt sitting upright on edge of bed as SLP arrived for the evaluation. Pt was receptive to evaluation and appeared alert and oriented. Clinical swallow evaluation completed with regular and thin items white bread with Peanut butter, honey hortencia cracker, and ice water through a straw. Pt demonstrated adequate oral and pharyngeal function with the solids, however he required extended mastication time, and had a reduced rate of oral transit.  While eating, pt should be sitting upright in bed or chair for duration of eating time. Pt did not demonstrate any overt s/s of aspiration during bedside evaluation. No video swallow has been completed as of yet, SLP to monitor pt for signs of infection d/t aspiration, VFFS may be indicated if signs arise. Skilled SLP indicated to trial advanced solids, determine efficacy of a diet upgrade. Cognitive evaluation indicated, scheduled for tomorrow.   SLP Total Evaluation Time   Eval: oral/pharyngeal swallow function, clinical swallow Minutes (82242) 60   SLP Goals   Therapy Frequency (SLP Eval) 6 times/week   SLP Predicted Duration/Target Date for Goal Attainment 10/12/24   SLP Goals Swallow   SLP Discharge Planning   SLP Plan SLP: Cognitive evaluation with RBANS on 9/29, pull to note. F/u 7/0 breakfast 09/29. Trial with regular/0 on 9/30   Total Session Time   Total Session Time (sum of timed and untimed services) 60   SLP - Acute Rehab Center Time   Individual Time (minutes) - SLP 60   ARC Total Session Time (minutes) - SLP 60   ARC Daily  Total Session Time   SLP ARC Daily Total Session Time 60   ARC Daily Rehab Total Minutes 60

## 2024-09-28 NOTE — PHARMACY-ADMISSION MEDICATION HISTORY
Admission medication history completed at [facility]. Please see Pharmacist Admission Medication History note from 9/23/2024.

## 2024-09-28 NOTE — PLAN OF CARE
Goal Outcome Evaluation: Ongoing   VS: T 97.7 orally, HR 68, RR 16 /64   O2: Denies chest pain or shortness of breath. 97% on room air.    Output: Continent of bowel and bladder.    Last BM: 9/28/24   Activity: Up with assist of one, walker and gait belt.    Skin: Edema in lower extremities. Charcot foot    Pain: None endorsed    Neuro: Alert and orientated times four. Left sided weakness, left facial droop, dysphasia    Dressing: None   Diet: Easy to chew with thin liquids. /158.   LDA: None    Equipment: Walker   Plan: TBD   Additional Info: Family here this morning. Participating in cares.          Plan of Care Reviewed With: patient    Overall Patient Progress: no changeOverall Patient Progress: no change

## 2024-09-28 NOTE — PROGRESS NOTES
Discharge Planner Post-Acute Rehab SLP:     Discharge Plan: discharge to home with family, ongoing SLP TBD    Precautions: Fall    Current Status:  Hearing: Unknown  Vision: Unknown  Communication: Appears intact  Cognition: Assess on 9/29  Swallow: Easy to chew (7), Thin liquids    Assessment: Pt sitting upright on edge of bed as SLP arrived for the evaluation. Pt was receptive to evaluation and appeared alert and oriented. Clinical swallow evaluation completed with regular and thin items white bread with Peanut butter, honey hortencia cracker, and ice water through a straw. Pt demonstrated adequate oral and pharyngeal function with the solids, however he required extended mastication time, and had a reduced rate of oral transit. While eating, pt should be sitting upright in bed or chair for duration of eating time. Pt did not demonstrate any overt s/s of aspiration during bedside evaluation. No video swallow has been completed as of yet, SLP to monitor pt for signs of infection d/t aspiration, VFFS may be indicated if signs arise. Skilled SLP indicated to trial advanced solids, determine efficacy of a diet upgrade. Cognitive evaluation indicated, scheduled for tomorrow.     Other Barriers to Discharge (Family Training, etc): None

## 2024-09-28 NOTE — PLAN OF CARE
Discharge Planner Post-Acute Rehab OT:     Discharge Plan: Home with help as needed for iadls and OP therapy (lives in [place])    Precautions: Fall, Seizure    Current Status:  ADLs:  Mobility: CGA w fww  Grooming: CGA standing at sink  Dressing: UB: SBA at eob; LB: CGA when standing; SBA for socks  Bathing: TBD  Toileting: CGA on/off HH toilet and standing pericare  IADLs: IND at baseline in all but driving; family/GF can help at discharge   Vision/Cognition: M-ACE score: 24/30 indicating normal range; vision appears intact per screening; wears readers    Assessment: Pt is a 70 y/o male admitted to ARU s/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 Pt also having PMH of left lower extremity DVT and was started on IV heparin drip 9/24. Pt reports at baseline having charcot on LLE and usually wears a boot/brace however it is not here. Functionally, at baseline pt is independent in all mobility and adls/iadls. Now, slightly below baseline with needing CGA with functional mobility and all basic ADLs. Per cog screening, pt scoring in normative range on M-ACE. Pt having deficits in LUE strrength and will continue to assess FM/GM coordination. Anticipate short stay of ~1 week prior to discharge to home with OP therapy.      Other Barriers to Discharge (DME, Family Training, etc):   FT: TBD  Pt lives in a rambler with his siblings, he has a girlfriend who is over frequently as well. Pt states someone is there all the time if he needs assist. 3 VALERIE with all needs met on main level. BR set up: tub/shower, gb, shower chair; standard toilet w gb near by; sleeps in a regular bed at home

## 2024-09-29 ENCOUNTER — APPOINTMENT (OUTPATIENT)
Dept: SPEECH THERAPY | Facility: CLINIC | Age: 69
DRG: 057 | End: 2024-09-29
Attending: PHYSICAL MEDICINE & REHABILITATION
Payer: MEDICARE

## 2024-09-29 ENCOUNTER — APPOINTMENT (OUTPATIENT)
Dept: PHYSICAL THERAPY | Facility: CLINIC | Age: 69
DRG: 057 | End: 2024-09-29
Attending: PHYSICAL MEDICINE & REHABILITATION
Payer: MEDICARE

## 2024-09-29 ENCOUNTER — APPOINTMENT (OUTPATIENT)
Dept: OCCUPATIONAL THERAPY | Facility: CLINIC | Age: 69
DRG: 057 | End: 2024-09-29
Attending: PHYSICAL MEDICINE & REHABILITATION
Payer: MEDICARE

## 2024-09-29 LAB
GLUCOSE BLDC GLUCOMTR-MCNC: 102 MG/DL (ref 70–99)
GLUCOSE BLDC GLUCOMTR-MCNC: 104 MG/DL (ref 70–99)
GLUCOSE BLDC GLUCOMTR-MCNC: 123 MG/DL (ref 70–99)
GLUCOSE BLDC GLUCOMTR-MCNC: 141 MG/DL (ref 70–99)
GLUCOSE BLDC GLUCOMTR-MCNC: 187 MG/DL (ref 70–99)

## 2024-09-29 PROCEDURE — 96125 COGNITIVE TEST BY HC PRO: CPT | Mod: GN | Performed by: SPEECH-LANGUAGE PATHOLOGIST

## 2024-09-29 PROCEDURE — 128N000003 HC R&B REHAB

## 2024-09-29 PROCEDURE — 97116 GAIT TRAINING THERAPY: CPT | Mod: GP | Performed by: PHYSICAL THERAPIST

## 2024-09-29 PROCEDURE — 92526 ORAL FUNCTION THERAPY: CPT | Mod: GN | Performed by: SPEECH-LANGUAGE PATHOLOGIST

## 2024-09-29 PROCEDURE — 97750 PHYSICAL PERFORMANCE TEST: CPT | Mod: GP | Performed by: PHYSICAL THERAPIST

## 2024-09-29 PROCEDURE — 250N000013 HC RX MED GY IP 250 OP 250 PS 637

## 2024-09-29 PROCEDURE — 97535 SELF CARE MNGMENT TRAINING: CPT | Mod: GO | Performed by: OCCUPATIONAL THERAPIST

## 2024-09-29 RX ADMIN — SENNOSIDES AND DOCUSATE SODIUM 2 TABLET: 50; 8.6 TABLET ORAL at 20:03

## 2024-09-29 RX ADMIN — APIXABAN 10 MG: 5 TABLET, FILM COATED ORAL at 20:03

## 2024-09-29 RX ADMIN — INSULIN ASPART 1 UNITS: 100 INJECTION, SOLUTION INTRAVENOUS; SUBCUTANEOUS at 12:59

## 2024-09-29 RX ADMIN — INSULIN GLARGINE 15 UNITS: 100 INJECTION, SOLUTION SUBCUTANEOUS at 08:36

## 2024-09-29 RX ADMIN — LOSARTAN POTASSIUM 25 MG: 25 TABLET, FILM COATED ORAL at 08:32

## 2024-09-29 RX ADMIN — INSULIN ASPART 1 UNITS: 100 INJECTION, SOLUTION INTRAVENOUS; SUBCUTANEOUS at 17:36

## 2024-09-29 RX ADMIN — APIXABAN 10 MG: 5 TABLET, FILM COATED ORAL at 08:32

## 2024-09-29 RX ADMIN — ATORVASTATIN CALCIUM 80 MG: 80 TABLET, FILM COATED ORAL at 20:04

## 2024-09-29 RX ADMIN — METFORMIN HYDROCHLORIDE 500 MG: 500 TABLET, EXTENDED RELEASE ORAL at 17:36

## 2024-09-29 ASSESSMENT — ACTIVITIES OF DAILY LIVING (ADL)
ADLS_ACUITY_SCORE: 26
ADLS_ACUITY_SCORE: 26
ADLS_ACUITY_SCORE: 25
ADLS_ACUITY_SCORE: 26
ADLS_ACUITY_SCORE: 26
ADLS_ACUITY_SCORE: 25
ADLS_ACUITY_SCORE: 25
ADLS_ACUITY_SCORE: 26
ADLS_ACUITY_SCORE: 25
ADLS_ACUITY_SCORE: 26
ADLS_ACUITY_SCORE: 26
ADLS_ACUITY_SCORE: 25
ADLS_ACUITY_SCORE: 25
ADLS_ACUITY_SCORE: 26
ADLS_ACUITY_SCORE: 26
ADLS_ACUITY_SCORE: 25
ADLS_ACUITY_SCORE: 26
ADLS_ACUITY_SCORE: 25
ADLS_ACUITY_SCORE: 26

## 2024-09-29 NOTE — PLAN OF CARE
Goal Outcome Evaluation: Ongoing        Goal Outcome Evaluation: Ongoing   VS: T 97.5 orally, HR 87, RR 18 /74   O2: Denies chest pain or shortness of breath. 96% on room air.    Output: Continent of bowel and bladder.    Last BM: 9/29/24   Activity: Up with assist of one, walker and gait belt.    Skin: Edema in lower extremities. Charcot foot    Pain: None endorsed    Neuro: Alert and orientated times four. Left sided weakness, left facial droop, dysphasia    Dressing: None   Diet: Easy to chew with thin liquids. /141   LDA: None    Equipment: Walker   Plan: TBD   Additional Info:

## 2024-09-29 NOTE — PROGRESS NOTES
"   09/29/24 5950   Appointment Info   Signing Clinician's Name / Credentials (SLP) Natasha Fallon MS, CCC-SLP   General Information   Onset of Illness/Injury or Date of Surgery 09/23/24   Referring Physician Hira Vasquez MD   Pertinent History of Current Problem Per H&P report, \"Francisco J Felipe is a 69 year old male with pmh HTN, T2DM c/b diabetic foot ulcers and charcot foot, who presented to the [facility] emergency department on 9/23 with a 3 day history of left-sided facial droop and unsteady gait. Stroke workup was initiated and MRI brain showed acute CVA of the right Robb shlaom. He also was noted to have Left transverse sigmoid and sigmoid sinus occlusion, without headache. 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 He was subsequently started on IV heparin drip per neurology recommendation on 9/24/2024.  He was switched to oral Eliquis on 9/27. Hospital course was further complicated by vasovagal syncope suspected due to constipation; pt was started on miralax and lactulose.\" SLP consult recieved order for evaluation and treatment as indicated.   General Observations Cognitive-linguistic evaluation indicated/recommended with ARU SLP.   Type of Evaluation   Type of Evaluation Speech, Language, Cognition   Motor Speech   Speech Intelligibility (Motor Speech) conversational level;WFL   Auditory Comprehension   Follows Commands (Auditory Comprehension) 1-step command;2-step commands;3-step commands;WFL   Verbal Expression   Conversational Speech (Verbal Expression) connected speech;WFL   Cognition   Cognitive Function attention deficit;executive function deficit;memory deficit   Cognitive Status Alert, pleasant, cooperative.   Additional cognitive-linguistic evaluation " "indicated  Completed   Cognitive Status Exam Comments RBANS form A administered and interpreted, please see progress note/below for details.   Orientation Status (Cognition) person;place;situation;time   Affect/Mental Status (Cognition) WNL   Follows Commands (Cognition) WFL   Executive Function Deficit (Cognition) moderate deficit   Attention Deficit (Cognition) moderate deficit   Memory Deficit (Cognition) severe deficit;immediate recall;moderate deficit;short-term memory   General Therapy Interventions   Planned Therapy Interventions Cognitive Treatment   Cognitive treatment Internal memory strategy training;External memory strategy training;Progressive attention training   Clinical Impression   Criteria for Skilled Therapeutic Interventions Met (SLP Eval) Yes, treatment indicated   SLP Diagnosis Moderate cognitive impairment   Activity Limitations Related to Problem List (SLP) Increased assist/supervision with iADLs.   Risks & Benefits of therapy have been explained evaluation/treatment results reviewed;care plan/treatment goals reviewed;participants voiced agreement with care plan;participants included;patient   Clinical Impression Comments SLP: Motor speech, language intact. RBANS form A administered and interpreted. Pt's overall score \"extremely low\" for age range. Pt's delayed recall less impaired than immediate recall. Attention domain score lower than anticipated. Skilled SLP services indicated to train in cognitive strategies, promote cognitive skills development with therapeutic activities.   SLP Total Evaluation Time   Cognitive  Performance Testing Minutes, per hour - includes time for administering test, interp results & prep report ([id number]) 39   SLP Goals   Therapy Frequency (SLP Eval) 6 times/week   SLP Predicted Duration/Target Date for Goal Attainment 10/05/24   SLP Goals SLP Goal 1;SLP Goal 2;SLP Goal 3   SLP: Goal 1 Patient will complete moderate level attention (selective, alternating) tasks " with 85% or greater independent accuracy.   SLP: Goal 2 Patient will complete moderate complexity reasoning/problem solving tasks with 90% or greater independent accuracy.   SLP: Goal 3 With use of trained memory strategies, pt will recall functional/daily information with 90% or greater independent accuracy.   SLP Discharge Planning   SLP Plan SLP: Trial regular/0, advance solids as indicated. MARILU as treatment. Intro memory strategies. Tx easy level attention, reasoning, memory.   Total Session Time   Timed Code Treatment Minutes 45   Total Session Time (sum of timed and untimed services) 45   SLP - Acute Rehab Center Time   Individual Time (minutes) - SLP 45   Group Time (minutes) - SLP 0   Concurrent Time (minutes) - SLP 0   Co-Treatment Time (minutes) - SLP 0   ARC Total Session Time (minutes) - SLP 45   ARC Daily Total Session Time   SLP ARC Daily Total Session Time 75   ARC Daily Rehab Total Minutes 135         Repeatable Battery for the Assessment of Neuropsychological Status (RBANS) FORM    Immediate Memory Visuospatial/  Constructional Language Attention Delayed Memory Total Scale   Index Score 49 69 85 53 81 59   Percentile Rank <0.1 2 16 0.1 10 0.3     SLP:  Pt seen for administration of RBANS. Results are based on a mean of 100 and a standard deviation of +/- 15.   Interpretation:  Please see clinical impressions above.  Face to Face Administration: 35  Scoring/Interpretation: 10  Total Time: 45

## 2024-09-29 NOTE — PROGRESS NOTES
09/29/24 1400   Signing Clinician's Name / Credentials   Signing clinician's name / credentials Delilah Donaldson, PT, DPT   Functional Gait Assessment (CHRISTINE Luna, MODESTO Rojo, et al. (2004))   1. GAIT LEVEL SURFACE 1   2. CHANGE IN GAIT SPEED 2   3. GAIT WITH HORIZONTAL HEAD TURNS 2   4. GAIT WITH VERTICAL HEAD TURNS 2   5. GAIT AND PIVOT TURN 3   6. STEP OVER OBSTACLE 1   7. GAIT WITH NARROW BASE OF SUPPORT 0   8. GAIT WITH EYES CLOSED 1   9. AMBULATING BACKWARDS 2   10. STEPS 2   Total Functional Gait Assessment Score   TOTAL SCORE: (MAXIMUM SCORE 30) 16     Functional Gait Assessment (FGA): The FGA assesses postural stability during various walking tasks.     Gait assistive device used: None, needing cga to close sba      Scores of <22 /30 have been correlated with predicting falls in community-dwelling older adults according to Jeremy & Garcia 2010.   Scores of <18 /30 have been correlated with increased risk for falls in patients with Parkinsons Disease according to Abdelrahman Isaacs Zhou et al 2014.  Minimal Detectable Change for patients with acute/chronic stroke = 4.2 according to Thimaximilian & Harshadschel 2009  Minimal Detectable Change for patients with vestibular disorder = 8 according to Jeremy & Garcia 2010     Assessment (rationale for performing, application to patient s function & care plan): Performed to assess balance with gait. Scored at 16/30. Pt will benefit from continued static and dynamic gait training, coordination drills, mariel to challenge LLE.  Pt is using a fww in the room with nursing for safety.   (Minutes billed as physical performance test): 18

## 2024-09-29 NOTE — CONSULTS
Social Work: Initial Assessment with Discharge Plan    Patient Name: Francisco J Felipe  : 1955  Age: 69 year old  MRN: [id number]  Completed assessment with: Chart review and interview with patient.   Admitted to ARU: 2024    Presenting Information   Date of SW assessment: 2024  Health Care Directive: Provided education/Regular  will check if Pt. Completed blank copy.  Primary Health Care Agent: Patient/self. In absence of HCD, per  [facility] policy, pt.... would be HCD agent  Secondary Health Care Agent: [facility]  Living Situation:  Home, lives with brother and sister. Patient lives with his brother in their rambler house. There are 3 steps to enter, otherwise bed and bath rooms are on the main level.   Previous Functional Status: At baseline he is independent in his ADL/IADL's. He drives.   DME available: Unknown   Patient and family understanding of hospitalization: Appropriate and Pleasant.  Cultural/Language/Spiritual Considerations: Pt is a 69 Y.O. single male, Jainism, and English speaking.    Physical Health  Reason for admission:   69 year old male admitted on 2024. He presents to the emergency department for evaluation of left-sided facial droop and unsteady gait over the past 3 days. 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 Left lower extremity ultrasound noted to have DVT in in Left common femoral, profunda femoral, femoral, popliteal, and peroneal veins. Started on IV heparin drip per neurology recommendation on 2024.  Pt switched to oral Eliquis. 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 Hospital course was complicated by vasovagal syncope suspected due to constipation; pt was started on miralax and lactulose.       Provider Information   Primary Care Physician:No (states was going to the [facility] ([facility]) but would like a clinic closer to [facility]; would like to establish care at the [facility].)   : None reported.    Mental Health/Chemical Dependency:   Diagnosis: Pt. said, good.  Alcohol/Tobacco/Narcotis: None reported.  Support/Services in Place: Family.  Services Needed/Recommended: Pt. Did want to see a [place] and  offered. Health Psychology support while on ARU available.   Sexuality/Intimacy: Not discussed     Support System  Marital Status: Single  Family support: Siblings live with pt.   Other support available:   Areas of fulfillment/ace:     Community Resources  Current in home services: no  Previous services: None reported and declined.  Financial/Employment/Education  Employment Status: retired/[employer]  Income Source: S.S.and Pension  Education: Tech school  Financial Concerns: Discussed ARU coverage.   Insurance: MEDICARE PT A ONLY     Discharge Plan     Patient and family discharge goal: To be determined, pending progress  Provided Education on discharge plan: Evaluations and discharge recommendations pending.   Patient agreeable to discharge plan:  Pending further discussion. Evaluations and discharge recommendations pending.   Provided education and attained signature for Medicare IM and IRF Patient Rights and Privacy Information provided to patient : yes  Provided patient with [facility] Resources: yes/ made referral.  Barriers to discharge: Pt states he will have family members or his girlfriend able to be present at home when he gets home.     Discharge Recommendations   Disposition: Pt. Wants to go home  Transportation Needs: Patient, family/friends, paid transport, insurance transport (if applicable)   Name of Transportation Company and Phone: N/A    Additional comments   Discharge TBDQUINTON 8 days . Evals and  "discharge needs pending. SW will remain available and continue to follow as needs arise.     SW will continue to remain available for patient and family support, discharge planning, and access to resources.    ------------------------------------------------------------------------------------------------------------    YUMIKO Pain Assessment    Pain Effect on Sleep  Over the past 5 days, how much of the time has pain made it hard for you to sleep at night?\"    0. Does not apply - I have not had any pain or hurting in the past 5 days    Pain Interference with Therapy Activities  \"Over the past 5 days, how often have you limited your participation in rehabilitation therapy sessions due to pain?\"  0. Does not apply - I have not had any pain or hurting in the past 5 days    Pain Interference with Day-to-Day Activities  \"Over the past 5 days, how often have you limited your day-to-day activities (excluding rehabilitation therapy sessions) because of pain?\"  1. Rarely or not at all  -------------------------------------------------------------------------------------------------------------    TRANSPORTATION:    Has lack of transportation kept you from medical appointments, meetings, work, or from getting things needed for daily living?  A. Yes, it has kept me from medical appointments or from getting my medications  B. Yes, it has kept me from non-medical meetings, appointments, work or from getting things that I need  C. No  X. Patient Unable to respond  Y. Patient declines to respond  -------------------------------------------------------------------------------------------------------------  Health Literacy:   How Often do you need to have someone help you when you read instructions, pamphlets, or other written material from your doctor or pharmacy?  0.       Never  1.       Rarely  2.       Sometimes  3.       Often  4.       Always  5.       Patient declines to respond  6.       Patient unable to " respond  ------------------------------------------------------------------------------------------------------------    SOCIAL ISOLATION  How often do you feel lonely or isolated from those around you?  0.       Never  1.       Rarely  2.       Sometimes  3.       Often  4.       Always  5.       Patient declines to respond  6.       Patient unable to respond  -------------------------------------------------------------------------------------------------------------        Xiao Barney, [facility] Acute Inpatient Rehab Unit Casual  [address] [place] [address], [address] [place] [id number]  Phone: [phone]  Fax: [phone]

## 2024-09-29 NOTE — PLAN OF CARE
Goal Outcome Evaluation:      Plan of Care Reviewed With: patient    Overall Patient Progress: improvingOverall Patient Progress: improving    Outcome Evaluation: Patient is alert and oriented x 4. Denied pain, headache, dizziness, CP or SOB. A-1 walker. easy to chew/thin good appetite. BP and BG monitored. Continent of B/B last BM 9/28. Patient uses call light appropriately and able to make needs known. No Care concern at this time. Call light is with in reach alarm is on.

## 2024-09-29 NOTE — PROGRESS NOTES
09/29/24 1404   Signing Clinician's Name / Credentials   Signing clinician's name / credentials Delilah Donaldson,PT, DPT   Maldonado Balance Scale (ROSALINDA SAGASTUME, ALEXANDRIA S, KYLE DOSHI, TIMOTHY B: MEASURING BALANCE IN THE ELDERLY: VALIDATION OF AN INSTRUMENT. CAN. J. PUB. HEALTH, JULY/AUGUST SUPPLEMENT 2:S7-11, 1992.)   Sit To Stand 3   Standing Unsupported 4   Sitting Unsupported 4   Stand to Sit 4   Transfers 2   Standing with Eyes Closed 3   Standing Unsupported, Feet Together 4   Reach Forward With Outstretched Arm 2   Retrieve Object From Floor 3   Turning to Look Behind 1   Turn 360 Degrees 1   Placing Alternate Foot on Stool (4-6 inches) 0   Unsupported Tandem Stand (Demonstrate to Subject) 2   One Leg Stand 1   Total Score (A score of 45 or less has been correlated with an increased risk of falls)   Total Score (out of 56) 34     Maldonado Balance Scale (BBS) Cutoff Scores for CVA Population:    The BBS is a measure of static and dynamic standing balance that has been validated in community dwelling elderly individuals and individuals who have Parkinson's Disease, MS, and those who are s/p CVA and TBI. The test is administered without an assistive device. Scores from the Maldonado are used to determine the probability of falling based on the patient's previous history of falls and their test performance.     0-20 High risk for falling- Corresponded with w/c bound status  21-40 Medium risk for falling- Able to walk with assistance  41-56 Low risk for falling- Able to walk independently  According to [facility].  Available at http://www.strokecenter.org/.  Accessibility verified April 10, 2013.  Minimal Detectable Change = 6.5 according to Mario & Seney 2008    Assessment (rationale for performing, application to patient s function & care plan): Pt is s/p recent mid shalom stroke on R, with L charcot foot, and now with impaired balance and activity tolerance.  Pt scored in the medium risk for falls range, and  pt is using a fww with A with nursing and in therapy at this time.  Discussed results with pt, and educated about the need for an assistive device at this time.   (Minutes billed as physical performance test) 18

## 2024-09-29 NOTE — PLAN OF CARE
Goal Outcome Evaluation:      Plan of Care Reviewed With: patient    Overall Patient Progress: no change    Outcome Evaluation: Pt asking questions about meds, continues to be safe using call light and waiting for assistance.    FOCUS/GOAL  Pain management    ASSESSMENT, INTERVENTIONS AND CONTINUING PLAN FOR GOAL:  Pt Aox4, using call light and waiting for assistance. A1 w/w.  Denies pain, cough, sob, chest pain, dizziness, n/v.  Pt is cont of B&B, LBM 29th.  Diabetic with QID BG and s/s insulin. 0-7 diet, taking pills whole with water.  Working with therapies.  Nursing will continue with POC.

## 2024-09-29 NOTE — PLAN OF CARE
Goal Outcome Evaluation:      Plan of Care Reviewed With: patient    Overall Patient Progress: improvingOverall Patient Progress: improving     Overall pt had no acute issue this shift. Pt is alert and oriented x 4. Able to make needs known. Denied having chest pain, SOB, N/V, fever and chills. Pt appears to be continent of both bowel and bladder. Last bowel movement this shift. PVR =95. Transfers with assist of 1 using a walker and GB. Blood sugar @ 0200 104. No complain at this time. Will continue with POC

## 2024-09-29 NOTE — PLAN OF CARE
"Discharge Planner Post-Acute Rehab SLP:     Discharge Plan: discharge to home with family, ongoing SLP for cognition    Precautions: Fall    Current Status:  Hearing: WFL  Vision: Readers (at home, sig other brought in glasses that pt says aren't his, earpiece broken)  Communication: Motor speech, language intact  Cognition: Moderate cognitive impairment; attention, reasoning, and memory deficits  Swallow: Easy to chew (7), Thin liquids (0). Monitor for s/sx of infection, respiratory distress; if present VFSS likely inidicated.    Assessment:   Swallow: Pt seated up at edge of bed for breakfast. Pt consumed bites of easy to chew (7) brekafast and cup edge and straw sips of thin (0) with no s/sx of dysphagia, airway penetration/aspiration. No pocketing/residue with solid intake.     Cognitive Eval: Motor speech, language intact. RBANS form A administered and interpreted. Pt's overall score \"extremely low\" for age range. Pt's delayed recall less impaired than immediate recall. Attention domain score lower than anticipated. Skilled SLP services indicated to train in cognitive strategies, promote cognitive skills development with therapeutic activities.     Other Barriers to Discharge (Family Training, etc): level of assist/supervision with iADLs.  "

## 2024-09-29 NOTE — PLAN OF CARE
"Discharge Planner Post-Acute Rehab OT:     Discharge Plan: Home with family IADL support and OP therapy    Precautions: Fall, Seizure    Current Status:  ADLs:  Mobility: SBA/CGA FWW.  Grooming: SBA standing.  Dressing: UB Set up. LB CGA. Feet Set up.  Bathing: CGA extended tub bench.  Toileting: SBA/CGA transfer and hygiene/clothing management with FWW and grab bar.   IADLs: IND at baseline in all but driving; family/GF can help at discharge   Vision/Cognition: M-ACE 24/30 (<21/30 impaired); vision appears intact per screening; wears readers    Assessment: Completion of shower with ADL routine. Pt completes ambulatory ADLs using FWW, at times needing cues to initiate use of walker with mobility. Mild incoordination and weakness in LUE.     Other Barriers to Discharge (DME, Family Training, etc):   Caregiver support: Lives with siblings that can \"provide support as needed\". Recommend family call/update prior to discharge regarding recommendations.  Home set up: 3 VALERIE and all needs on main level.  Equipment: Shower chair, bathroom grab bars.  "

## 2024-09-30 ENCOUNTER — APPOINTMENT (OUTPATIENT)
Dept: PHYSICAL THERAPY | Facility: CLINIC | Age: 69
DRG: 057 | End: 2024-09-30
Attending: PHYSICAL MEDICINE & REHABILITATION
Payer: MEDICARE

## 2024-09-30 ENCOUNTER — APPOINTMENT (OUTPATIENT)
Dept: OCCUPATIONAL THERAPY | Facility: CLINIC | Age: 69
DRG: 057 | End: 2024-09-30
Attending: PHYSICAL MEDICINE & REHABILITATION
Payer: MEDICARE

## 2024-09-30 ENCOUNTER — APPOINTMENT (OUTPATIENT)
Dept: SPEECH THERAPY | Facility: CLINIC | Age: 69
DRG: 057 | End: 2024-09-30
Attending: PHYSICAL MEDICINE & REHABILITATION
Payer: MEDICARE

## 2024-09-30 LAB
ANION GAP SERPL CALCULATED.3IONS-SCNC: 12 MMOL/L (ref 7–15)
BASOPHILS # BLD AUTO: 0 10E3/UL (ref 0–0.2)
BASOPHILS NFR BLD AUTO: 1 %
BUN SERPL-MCNC: 19.3 MG/DL (ref 8–23)
CALCIUM SERPL-MCNC: 9.2 MG/DL (ref 8.8–10.4)
CHLORIDE SERPL-SCNC: 107 MMOL/L (ref 98–107)
CREAT SERPL-MCNC: 0.94 MG/DL (ref 0.67–1.17)
EGFRCR SERPLBLD CKD-EPI 2021: 89 ML/MIN/1.73M2
EOSINOPHIL # BLD AUTO: 0.6 10E3/UL (ref 0–0.7)
EOSINOPHIL NFR BLD AUTO: 8 %
ERYTHROCYTE [DISTWIDTH] IN BLOOD BY AUTOMATED COUNT: 11.7 % (ref 10–15)
GLUCOSE BLDC GLUCOMTR-MCNC: 122 MG/DL (ref 70–99)
GLUCOSE BLDC GLUCOMTR-MCNC: 150 MG/DL (ref 70–99)
GLUCOSE BLDC GLUCOMTR-MCNC: 163 MG/DL (ref 70–99)
GLUCOSE BLDC GLUCOMTR-MCNC: 172 MG/DL (ref 70–99)
GLUCOSE BLDC GLUCOMTR-MCNC: 84 MG/DL (ref 70–99)
GLUCOSE SERPL-MCNC: 103 MG/DL (ref 70–99)
HCO3 SERPL-SCNC: 19 MMOL/L (ref 22–29)
HCT VFR BLD AUTO: 36.7 % (ref 40–53)
HGB BLD-MCNC: 12 G/DL (ref 13.3–17.7)
IMM GRANULOCYTES # BLD: 0 10E3/UL
IMM GRANULOCYTES NFR BLD: 0 %
LYMPHOCYTES # BLD AUTO: 2.9 10E3/UL (ref 0.8–5.3)
LYMPHOCYTES NFR BLD AUTO: 37 %
MCH RBC QN AUTO: 31 PG (ref 26.5–33)
MCHC RBC AUTO-ENTMCNC: 32.7 G/DL (ref 31.5–36.5)
MCV RBC AUTO: 95 FL (ref 78–100)
MONOCYTES # BLD AUTO: 0.5 10E3/UL (ref 0–1.3)
MONOCYTES NFR BLD AUTO: 6 %
NEUTROPHILS # BLD AUTO: 3.7 10E3/UL (ref 1.6–8.3)
NEUTROPHILS NFR BLD AUTO: 48 %
NRBC # BLD AUTO: 0 10E3/UL
NRBC BLD AUTO-RTO: 0 /100
PLATELET # BLD AUTO: 208 10E3/UL (ref 150–450)
POTASSIUM SERPL-SCNC: 4.2 MMOL/L (ref 3.4–5.3)
RBC # BLD AUTO: 3.87 10E6/UL (ref 4.4–5.9)
SODIUM SERPL-SCNC: 138 MMOL/L (ref 135–145)
WBC # BLD AUTO: 7.7 10E3/UL (ref 4–11)

## 2024-09-30 PROCEDURE — 97535 SELF CARE MNGMENT TRAINING: CPT | Mod: GO | Performed by: STUDENT IN AN ORGANIZED HEALTH CARE EDUCATION/TRAINING PROGRAM

## 2024-09-30 PROCEDURE — 85025 COMPLETE CBC W/AUTO DIFF WBC: CPT

## 2024-09-30 PROCEDURE — 99232 SBSQ HOSP IP/OBS MODERATE 35: CPT | Mod: GC

## 2024-09-30 PROCEDURE — 97110 THERAPEUTIC EXERCISES: CPT | Mod: GP | Performed by: PHYSICAL THERAPIST

## 2024-09-30 PROCEDURE — 97116 GAIT TRAINING THERAPY: CPT | Mod: GP | Performed by: PHYSICAL THERAPIST

## 2024-09-30 PROCEDURE — 36415 COLL VENOUS BLD VENIPUNCTURE: CPT

## 2024-09-30 PROCEDURE — 97129 THER IVNTJ 1ST 15 MIN: CPT | Mod: GN

## 2024-09-30 PROCEDURE — 92526 ORAL FUNCTION THERAPY: CPT | Mod: GN

## 2024-09-30 PROCEDURE — 250N000013 HC RX MED GY IP 250 OP 250 PS 637

## 2024-09-30 PROCEDURE — 128N000003 HC R&B REHAB

## 2024-09-30 PROCEDURE — 80048 BASIC METABOLIC PNL TOTAL CA: CPT

## 2024-09-30 PROCEDURE — 97130 THER IVNTJ EA ADDL 15 MIN: CPT | Mod: GN

## 2024-09-30 RX ORDER — AMOXICILLIN 250 MG
2 CAPSULE ORAL 2 TIMES DAILY PRN
Status: DISCONTINUED | OUTPATIENT
Start: 2024-09-30 | End: 2024-10-03 | Stop reason: HOSPADM

## 2024-09-30 RX ORDER — POLYETHYLENE GLYCOL 3350 17 G/17G
17 POWDER, FOR SOLUTION ORAL DAILY PRN
Status: DISCONTINUED | OUTPATIENT
Start: 2024-09-30 | End: 2024-10-03 | Stop reason: HOSPADM

## 2024-09-30 RX ADMIN — INSULIN ASPART 1 UNITS: 100 INJECTION, SOLUTION INTRAVENOUS; SUBCUTANEOUS at 12:59

## 2024-09-30 RX ADMIN — APIXABAN 10 MG: 5 TABLET, FILM COATED ORAL at 21:16

## 2024-09-30 RX ADMIN — METFORMIN HYDROCHLORIDE 500 MG: 500 TABLET, EXTENDED RELEASE ORAL at 18:07

## 2024-09-30 RX ADMIN — INSULIN ASPART 1 UNITS: 100 INJECTION, SOLUTION INTRAVENOUS; SUBCUTANEOUS at 18:07

## 2024-09-30 RX ADMIN — ATORVASTATIN CALCIUM 80 MG: 80 TABLET, FILM COATED ORAL at 21:16

## 2024-09-30 RX ADMIN — INSULIN GLARGINE 15 UNITS: 100 INJECTION, SOLUTION SUBCUTANEOUS at 08:39

## 2024-09-30 RX ADMIN — SENNOSIDES AND DOCUSATE SODIUM 2 TABLET: 50; 8.6 TABLET ORAL at 08:41

## 2024-09-30 RX ADMIN — APIXABAN 10 MG: 5 TABLET, FILM COATED ORAL at 08:41

## 2024-09-30 RX ADMIN — LOSARTAN POTASSIUM 25 MG: 25 TABLET, FILM COATED ORAL at 08:41

## 2024-09-30 ASSESSMENT — ACTIVITIES OF DAILY LIVING (ADL)
ADLS_ACUITY_SCORE: 26

## 2024-09-30 NOTE — PLAN OF CARE
Goal Outcome Evaluation:    Overall Patient Progress: no change    Outcome Evaluation: No change in Pt progress this shift.    Pt is alert and oriented. Continent of B&B. LBM 9/30. Ax1 walker. Denied pain, SOB, CP, and n/t. BG was 84. Call light within reach and bed alarms on. Pt is able to make needs known. Appeared asleep during safety checks. Will continue with POC.

## 2024-09-30 NOTE — PLAN OF CARE
"Discharge Planner Post-Acute Rehab OT:     Discharge Plan: Home with family IADL support and OP therapy    Precautions: Fall, Seizure    Current Status:  ADLs:  Mobility: SBA/CGA FWW.  Grooming: SBA standing.  Dressing: UB Set up. LB SBA Feet Set up.  Bathing: CGA extended tub bench.  Toileting: SBA/CGA transfer and hygiene/clothing management with FWW and grab bar.   IADLs: IND at baseline in all but driving; family/GF can help at discharge   Vision/Cognition: M-ACE 24/30 (<21/30 impaired); vision appears intact per screening; wears readers    Assessment: ADL improving. Pt completed kitchen task with walker at SBA level.     Other Barriers to Discharge (DME, Family Training, etc):   Caregiver support: Lives with siblings that can \"provide support as needed\". Recommend family call/update prior to discharge regarding recommendations.  Home set up: 3 VALERIE and all needs on main level.  Equipment: Shower chair, bathroom grab bars.  "

## 2024-09-30 NOTE — PROGRESS NOTES
[facility]   Acute Rehabilitation Unit  Daily Progress Note    INTERVAL HISTORY  Notes and labs reviewed over past 24 hours. No acute overnight events reported. Francisco J reports that he has being doing well since our last meeting on 9/27 and has no acute concerns today. He has been happy with his progress through PT/OT and states that he feels his major deficit at this point is balance/coordination. He denies any pain, chest pain, headaches, shortness of breath, or nausea/vomiting. He did have an episode of loose stools yesterday. He otherwise states that he has been having daily Bms.     We discussed how he will be discharging on insulin, and how lifestyle modifications would be necessary for secondary stroke prevention. He reports that he will limit sweets and will stop drinking pop. He is interested in meeting with a diabetes educator.     ROS: 10 point ROS was assessed and was negative unless otherwise stated in HPI    Functionally,  With PT: 9/29  Current Status:  Bed Mobility: sba   Transfer: cga/Janice, fww  Gait: cga , fww 180 ft.   Stairs: cga, 2 HR x 12 (6 inch )   Balance: Pt can sit EOB Hali, needs assistive device and cga with gait.   Outcome Measures:   Maldonado- 9/29 = 34/56  FGA- 9/29 = 16/30     With OT: 9/30  Current Status:  ADLs:  Mobility: SBA/CGA FWW.  Grooming: SBA standing.  Dressing: UB Set up. LB SBA Feet Set up.  Bathing: CGA extended tub bench.  Toileting: SBA/CGA transfer and hygiene/clothing management with FWW and grab bar.   IADLs: IND at baseline in all but driving; family/GF can help at discharge   Vision/Cognition: M-ACE 24/30 (<21/30 impaired); vision appears intact per screening; wears readers    With SLP:  9/30  Current Status:  Hearing: WFL  Vision: Readers (at home, sig other brought in glasses that pt says aren't his, earpiece broken)  Communication: Motor speech, language intact  Cognition: Moderate cognitive impairment; attention, reasoning, and  memory deficits  Swallow: Easy to chew (7), Thin liquids (0). Monitor for s/sx of infection, respiratory distress; if present VFSS likely inidicated.    MEDICATIONS  Scheduled meds  Current Facility-Administered Medications   Medication Dose Route Frequency Provider Last Rate Last Admin    apixaban ANTICOAGULANT (ELIQUIS) tablet 10 mg  10 mg Oral BID Hira Vasquez MD   10 mg at 09/29/24 2003    Followed by    [START ON 10/2/2024] apixaban ANTICOAGULANT (ELIQUIS) tablet 5 mg  5 mg Oral BID Hira Vasquez MD        atorvastatin (LIPITOR) tablet 80 mg  80 mg Oral QPM Hira Vasquez MD   80 mg at 09/29/24 2004    insulin aspart (NovoLOG) injection (RAPID ACTING)  1-7 Units Subcutaneous TID AC Hira Vasquez MD   1 Units at 09/29/24 1736    insulin aspart (NovoLOG) injection (RAPID ACTING)  1-5 Units Subcutaneous At Bedtime Hira Vasquez MD        insulin glargine (LANTUS PEN) injection 15 Units  15 Units Subcutaneous QAM AC Hira Vasquez MD   15 Units at 09/29/24 0836    losartan (COZAAR) tablet 25 mg  25 mg Oral Daily Hira Vasquez MD   25 mg at 09/29/24 0832    metFORMIN (GLUCOPHAGE XR) 24 hr tablet 500 mg  500 mg Oral Daily with supper Hira Vasquez MD   500 mg at 09/29/24 1736    polyethylene glycol (MIRALAX) Packet 17 g  17 g Oral Daily Hira Vasquez MD        senna-docusate (SENOKOT-S/PERICOLACE) 8.6-50 MG per tablet 2 tablet  2 tablet Oral BID Hira Vasquez MD   2 tablet at 09/29/24 2003       PRN meds:  Current Facility-Administered Medications   Medication Dose Route Frequency Provider Last Rate Last Admin    acetaminophen (TYLENOL) tablet 650 mg  650 mg Oral Q4H PRN Hira Vasquez MD        benzocaine-menthol (CHLORASEPTIC) 6-10 MG lozenge 1 lozenge  1 lozenge Buccal Q1H PRN Hira Vasquez MD        calcium carbonate (TUMS) chewable tablet 1,000 mg  1,000 mg Oral TID PRN Hira Vasquez MD        glucose gel 15-30 g  15-30 g Oral Q15 Min PRN Hira Vasquez MD        Or     "dextrose 50 % injection 25-50 mL  25-50 mL Intravenous Q15 Min PRN Hira Vasquez MD        Or    glucagon injection 1 mg  1 mg Subcutaneous Q15 Min PRN Hira Vasquez MD        melatonin tablet 3 mg  3 mg Oral At Bedtime PRN Hira Vasquez MD        Patient is already receiving anticoagulation with heparin, enoxaparin (LOVENOX), warfarin (COUMADIN)  or other anticoagulant medication   Does not apply Continuous PRN Hira Vasquez MD             PHYSICAL EXAM  /69 (BP Location: Right arm)   Pulse 83   Temp 97.2  F (36.2  C) (Oral)   Resp 18   Ht 1.727 m (5' 8\")   Wt 73.3 kg (161 lb 8 oz)   SpO2 98%   BMI 24.56 kg/m    General: in no acute distress, conversational and alert, resting comfortably in bed  HEENT: atraumatic, nares clear, conjunctiva white  Pulmonary: on room air, lungs clear to auscultation bilaterally  Cardiovascular: RRR, no murmur auscultated, well-perfused peripherally  Abdominal: soft, non-tender to palpation, non-distended  Extremities: warm peripherally, no edema in BLEs  Skin: warm, dry, without erythema, ecchymosis, or rash noted  MSK: no BLE edema noted, calves non-tender to palpation  Neuro: conjugate gaze, speech non-dysarthric, bilateral strength , elbow flexion, and elbow extension 5/5      LABS  Results for orders placed or performed during the hospital encounter of 09/27/24 (from the past 24 hour(s))   Glucose by meter   Result Value Ref Range    GLUCOSE BY METER POCT 102 (H) 70 - 99 mg/dL   Glucose by meter   Result Value Ref Range    GLUCOSE BY METER POCT 141 (H) 70 - 99 mg/dL   Glucose by meter   Result Value Ref Range    GLUCOSE BY METER POCT 187 (H) 70 - 99 mg/dL   Glucose by meter   Result Value Ref Range    GLUCOSE BY METER POCT 123 (H) 70 - 99 mg/dL   Glucose by meter   Result Value Ref Range    GLUCOSE BY METER POCT 84 70 - 99 mg/dL   CBC with Platelets & Differential    Narrative    The following orders were created for panel order CBC with Platelets & " Differential.  Procedure                               Abnormality         Status                     ---------                               -----------         ------                     CBC with platelets and d...[[id number]]  Abnormal            Final result                 Please view results for these tests on the individual orders.   Basic metabolic panel   Result Value Ref Range    Sodium 138 135 - 145 mmol/L    Potassium 4.2 3.4 - 5.3 mmol/L    Chloride 107 98 - 107 mmol/L    Carbon Dioxide (CO2) 19 (L) 22 - 29 mmol/L    Anion Gap 12 7 - 15 mmol/L    Urea Nitrogen 19.3 8.0 - 23.0 mg/dL    Creatinine 0.94 0.67 - 1.17 mg/dL    GFR Estimate 89 >60 mL/min/1.73m2    Calcium 9.2 8.8 - 10.4 mg/dL    Glucose 103 (H) 70 - 99 mg/dL   CBC with platelets and differential   Result Value Ref Range    WBC Count 7.7 4.0 - 11.0 10e3/uL    RBC Count 3.87 (L) 4.40 - 5.90 10e6/uL    Hemoglobin 12.0 (L) 13.3 - 17.7 g/dL    Hematocrit 36.7 (L) 40.0 - 53.0 %    MCV 95 78 - 100 fL    MCH 31.0 26.5 - 33.0 pg    MCHC 32.7 31.5 - 36.5 g/dL    RDW 11.7 10.0 - 15.0 %    Platelet Count 208 150 - 450 10e3/uL    % Neutrophils 48 %    % Lymphocytes 37 %    % Monocytes 6 %    % Eosinophils 8 %    % Basophils 1 %    % Immature Granulocytes 0 %    NRBCs per 100 WBC 0 <1 /100    Absolute Neutrophils 3.7 1.6 - 8.3 10e3/uL    Absolute Lymphocytes 2.9 0.8 - 5.3 10e3/uL    Absolute Monocytes 0.5 0.0 - 1.3 10e3/uL    Absolute Eosinophils 0.6 0.0 - 0.7 10e3/uL    Absolute Basophils 0.0 0.0 - 0.2 10e3/uL    Absolute Immature Granulocytes 0.0 <=0.4 10e3/uL    Absolute NRBCs 0.0 10e3/uL       ASSESSMENT AND PLAN    Francisco J Felipe is a 69 year old right hand dominant male with past medical history of HTN, T2DM c/b diabetic foot ulcerations and left charcot foot, who initially presented to [facility] on 9/23 with a 3 day history of left sided facial droop, and unsteady gait. Stroke workup revealed a small acute infarct of the right hemipons. He was  subsequently admitted to the  ARU on 9/27/2024 for ongoing rehabilitation. Deficits include impairments to mobility, ability to do ADLs, balance, coordination, cognition, processing, swallowing. Patient will require and benefit from PT, OT, and SLP services as well as all of the ancillary services offered in the inpatient rehab setting.       Updates Today:  - Constipation: Resolved. He has been having daily bowel movements since admission. Due to episode of loose stools, will change miralax and senna/docusate from scheduled to PRN administration.   - T2DM: Diabetes educator consult placed.    Admission to acute inpatient rehab: 9/27/2024   Impairment group code: Stroke Ischemic 01.1 (L) Body Involvement (R) Brain; Acute ischemic stroke of right pontine area due to small-vessel occlusion and right frontal area due to ESUS      PT, OT and SLP 60 minutes of each on a daily basis, 6x a week,  in addition to rehab nursing and close management of physiatrist.     Impairment of ADL's:  OT for 60 min daily, 6x a week to work on upper and lower body self care, dressing, toileting, bathing, energy conservation techniques with use of ADs as needed   Impairment of mobility:   PT for 60 min daily, 6x a week  to work on gait exercises, strengthening, endurance buildup, transfers with use of walker as needed   Impairment of cognition/language/swallow:   SLP for 60 min daily, 6x a week  for cognitive evaluation and treatment strategies for higher level cognitive deficits and memory impairment   Rehab RN to administer medication, patient education on medication taking, VS monitoring, bowel regimen, glucose monitoring and wound care/surgical wound dressing changes and monitoring.     Medical Conditions  # Acute Ischemic Stroke of the Right Robb shalom, ESUS (9/20/24)  # Dysphagia  # Hypertension  # Hyperlipidemia  Onset of symptoms 9/20/2024 with gait instability and facial droop. Stroke workup revealed acute infarction of the right  ariel-shalom. TTE on 9/24 did not demonstrate PFO or identifiable source of embolism. A1c=12.8%, Cholesterol 219, , Triglycerides 79. (9/23). Telemetry during acute stay did not identify atrial fibrillation. On admission his strength is overall well maintained. Left-sided coordination is mildly impaired. Dysphagia was noted at OSH.   - PT/OT/SLP  - Continue Lipitor 80 mg p.o. daily. Goal < 70.  - Continue losartan 25mg QD. Will likely increase in following days with BP goal < 130/80 mmhg.   - DAPT: Loaded with plavix on 9/24. Continue plavix 75 mg QD x3 months. ASA 81mg QD indefinitely.   - Ziopatch sent to home. Should apply after discharge.   - Outpatient stroke neurology follow up in 6-8 weeks.      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 No headache, no seizure history. 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  -Seizure precautions     # Common Femoral DVT, Left Lower Extremity (9/24/24)  # Deep Femoral DVT, Left Lower Extremity (9/24/24)  # Popliteal DVT, Left Lower Extremity (9/24/24)  # Peroneal Vein DVT, Left Lower Extremity (9/24/24)  Patient was found to have left lower extremity DVT on 9/24/2024.  Started on low-dose heparin drip at OSH prior to transitioning to eliquis. Ongoing LLE edema.   - Continue apixaban 5mg BID     # Constipation  # Vasovagal syncope  Patient experienced an episode of vasovagal syncope while having a bowel movement at OSH on 9/25. Symptoms included  bradycardia (HR=27 bpm), nausea, and vomiting.  and diaphoresis. Twelve-lead EKG was done and showed normal sinus rhythm with no acute changes. He reports that his last good bowel movement was 1 week PTA. Several small BM's during acute hospitalization.   - Continue miralax 17 g daily PRN  - Senna/docusate 2 tabs BID PRN  - Bisacodyl suppository QD  PRN       # Type 2 Diabetes Mellitus, Uncontrolled   # Charcot Foot, Left  Prior to acute hospitalization, patient had been taking metformin 500mg roughly every 3 days. A1C found to be 12.8% on 9/23/24.  -Continue metformin  mg p.o. QD  - Continue Lantus 15 units subQ qAM.  - Medium Dose sliding scale insulin  - Diabetes educator consultation placed. Appreciate involvement in patient's care.   - BG checks TID + AC  - Orthotics order placed for left diabetic shoe.   - Hypoglycemia protocol      # Charcot joint of left lower extremity:  CROW boot has been recommended by podiatry during ambulation.  Can be utilized if patient is able to have this brought from home  -Fall precautions     # Adjustment to disability:  Will hold on ordering rehab psychology at this time, will monitor mood over course of rehab stay     FEN: Soft, easy to chew. Thin Liquids  DVT Prophylaxis: Apixaban 5mg BID  GI Prophylaxis:  None  Code: Full, Confirmed on admission  Disposition: TBD, to discuss at interdiscipinary rounds   ELOS/Discharge Date:  10/5/24.  Rehab prognosis:  Good   Follow up Appointments on Discharge:   Outpatient PCP.   Outpatient PM&R.   Outpatient therapies.   Stroke Neurology (6-8 weeks)    Seen and discussed with Dr. Castelan, PM&R staff physician     --   Hira Vasquez MD  [facility] PM&R PGY-2  Pager: [phone]

## 2024-09-30 NOTE — CONSULTS
SPIRITUAL HEALTH SERVICES Consult Note  I met with pt this afternoon. He shared that he is doing well emotionally and spiritually today. He feels well supported from family and friends and is hopeful to be able to get home soon.     No other spiritual care needs at this time.       Filiberto Nova M.Div.  Associate

## 2024-09-30 NOTE — PLAN OF CARE
Goal Outcome Evaluation:    Patient is alert and oriented.  Patient transfers A1 walker  Patient continent of bowel and bladder. Last bowel movement yesterday.   Vital signs this shift B/P: 107/63, T: 97.2, P: 89, R: 16   Patient is able to make needs known, uses the call light appropriately. Continue with the plan of care.

## 2024-09-30 NOTE — PROGRESS NOTES
"Individualized Overall Plan Of Care (IOPOC)      Rehab diagnosis/Impairment Group Code: Stroke ischemic 01.1 (l) body involvement (r) brain; acute ischemic stroke of right pontine area due to small-vessel occlusion and right frontal area due to esus  Ischemic stroke (h)     Expected functional outcome: reach a level of mod I     Clinical Impression Comments: L hemiparesis post CVA     Mobility: Pt is a 68 y/o male s/p stroke to R ariel shalom with L facial droop, mild L hemiparesis, impaired coordination and balance.  Pt was previously I with his mobiiity and would walk his dog about 2 blocks PTA.  ELOS - 1 week, OP PT likely at discharge.    ADL: Pt is a 68 y/o male admitted to ARU s/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 Pt also having PMH of left lower extremity DVT and was started on IV heparin drip 9/24. Pt reports at baseline having charcot on LLE and usually wears a boot/brace however it is not here. Functionally, at baseline pt is independent in all mobility and adls/iadls. Now, slightly below baseline with needing CGA with functional mobility and all basic ADLs. Per cog screening, pt scoring in normative range on M-ACE. Pt having deficits in LUE strrength and will continue to assess FM/GM coordination. Anticipate short stay of ~1 week prior to discharge to home with OP therapy.    Communication/Cognition/Swallow: SLP: Motor speech, language intact. RBANS form A administered and interpreted. Pt's overall score \"extremely low\" for age range. Pt's delayed recall less impaired than immediate recall. Attention domain score lower than anticipated. Skilled SLP services indicated to train in cognitive strategies, promote cognitive skills development with therapeutic activities.     Intensity of therapy:   PT 60 minutes, 6x/week, for 10 days   OT 60 minutes, 6 times/week, for 10 days   SLP 60 minutes, 6 times/week, for 10 days      Education stroke  Neuropsychology Testing: No        Medical Prognosis: good       Physician " summary statement: L hemiparesis post CVA, goal is to reach a level of mod I     Discharge destination: prior home  Discharge rehabilitation needs: outpatient, PT, OT, and SLP      Estimated length of stay: 12 days       Rehabilitation Physician Wilfred Castelan DO

## 2024-09-30 NOTE — PROGRESS NOTES
Discharge Planner Post-Acute Rehab PT:     Discharge Plan: Home, lives with brother and sister. 3 [address], rail. Likely OP PT at [facility] .    Precautions: falls, seizures.     Current Status:  Bed Mobility: sba   Transfer: SBA/CGA, FWW in room, STC in therapy  Gait: up to 500 ft, STC, SBA/CGA.   Stairs: cga, 2 HR x 12 (6 inch )   Balance: intact steady state standing, needs UE support for walking/transfers.     Outcome Measures:   Maldonado- 9/29 = 34/56  FGA- 9/29 = 16/30       Assessment: Focused session on normalizing gait pattern through reciprocal movements and improving postural control with reduction in UE support tranitioning from FWW to STC in therapy.     Other Barriers to Discharge (DME, Family Training, etc):   - pt states he will have family members or his girlfriend able to be present at home when he gets home.

## 2024-09-30 NOTE — PLAN OF CARE
Discharge Planner Post-Acute Rehab SLP:     Discharge Plan: discharge to home with family, ongoing SLP for cognition    Precautions: Fall    Current Status:  Hearing: WFL  Vision: Readers (at home, sig other brought in glasses that pt says aren't his, earpiece broken)  Communication: Motor speech, language intact  Cognition: Moderate cognitive impairment; attention, reasoning, and memory deficits  Swallow: Regular, Thin liquids (0). Monitor for s/sx of infection, respiratory distress; if present VFSS likely inidicated.    Assessment:  Pt participated in MARILU as task. subtests completed and scores are as follows: counting money 90%IND, 100% min cues; solving daily math 50%IND, 100% min-mod cues; initiated medication label task, will finish next session. Noting impaired organization, working memory, sequencing. Likely vision impacting reading with some items as pt glasses not present. Encouraged pt to tell family to bring in as able. Benefited from use of calculator and repeition and break down of material to arrive at solution     PM: Pt seen with meal trial regular texture, thin (0) liquid. Pt with prolonged mastication, delayed AP transit, minimal oral residuals in R buccal cavity. residuals clearing mostly IND wtih liquid wash or continuation of meal. Pt educated on strategies for oral clearance. appropriate rate of intake and no fatigue or s/sx aspiration present across entire meal. recommend diet advancement to regular texture.     Other Barriers to Discharge (Family Training, etc): level of assist/supervision with iADLs.

## 2024-09-30 NOTE — CONSULTS
Consulted to run a test claim for Glargine, Rapid Insulin Analog, Glucose Meter/Supplies, & CGM.    Patient is currently uninsured and has no pharmacy benefits. The following are the out of pocket prices for these meds/products:    Glucose Meter/Supplies:  Glucocard Vital Meter kit - $0  Glucocard Vital strips #100 - $15  TechLITE lancets #100 - $10  Accuchek Guide Meter/Kit - $17  Accu-chek Guide strips #100 - $47  Accu-chek Softclix lancets #100 - $14  CGMs:  Freestyle Ozzie Rowlett - $265  Freestyle Ozzie Sensors - $265/4 weeks  Dexcom Transmitter - $494  Dexcom - $490  Dexcom Sensors - $491/mo  Glargine:  Insulin Glargine-YFGN pens - $187/box  Basaglar pens - $421/box  Semglee pens - $520/box  Lantus pens - $563/box  Rapid Analog:  Insulin Lispro pens - $200/box  Admelog pens - $248/box  Insulin Aspart pens - $345/box  Humalog pens - $679/box  Lyumjev pens - $679/box  Novolog pens - $715/box  Fiasp pens - $715/box      [place] Insulin Safety [place] Voucher for one free fill per calendar year is available for any of the above medications.       If patient does not enroll in pharmacy benefits, there are several outpatient programs to allow patient to acquire affordable insulin:    [place] Insulin Value Program:   https://www.insulinaffordability.com/  $35/mo copay card program for commercial and cash paying pts   Includes:   Basaglar   Humalog   Insulin Lispro   Humulin   Lyumjev     NovoNordisk myinsulinRx:   https://www.novocare.com/diabetes/help-with-costs/help-with-insulin-costs/myinsulinrx.html  $35/mo copay card program for cash paying pts   Includes:   Novolog   Fiasp   Insulin Aspart   Levemir   Tresiba   Novolin     Sanofi ValYOU:   https://www.[employer]arr[employer]/sanofidiabetes-savings-program   $35/mo copay card program for cash paying pts   Includes:  Admelog   Apidra   Lantus   Toujeo     All of the above insulins are also eligible for one free fill per calendar year at the pharmacy via the [place]  Insulin Safety Net Program.      Please feel free to contact me with any other test claims, prior authorizations, or insurance questions regarding outpatient medications.     Thanks!      Edie Larson [facility]  Discharge Pharmacy Liaison  [facility]charge [facility]  Pronouns: She/Her/Hers    Securely message with TapIn.tv, Epic Secure Chat, or [employer]  Phone: [phone]  Fax: [phone]  [email or web address]

## 2024-10-01 ENCOUNTER — APPOINTMENT (OUTPATIENT)
Dept: PHYSICAL THERAPY | Facility: CLINIC | Age: 69
DRG: 057 | End: 2024-10-01
Attending: PHYSICAL MEDICINE & REHABILITATION
Payer: MEDICARE

## 2024-10-01 ENCOUNTER — APPOINTMENT (OUTPATIENT)
Dept: SPEECH THERAPY | Facility: CLINIC | Age: 69
DRG: 057 | End: 2024-10-01
Attending: PHYSICAL MEDICINE & REHABILITATION
Payer: MEDICARE

## 2024-10-01 ENCOUNTER — APPOINTMENT (OUTPATIENT)
Dept: OCCUPATIONAL THERAPY | Facility: CLINIC | Age: 69
DRG: 057 | End: 2024-10-01
Attending: PHYSICAL MEDICINE & REHABILITATION
Payer: MEDICARE

## 2024-10-01 LAB
GLUCOSE BLDC GLUCOMTR-MCNC: 140 MG/DL (ref 70–99)
GLUCOSE BLDC GLUCOMTR-MCNC: 142 MG/DL (ref 70–99)
GLUCOSE BLDC GLUCOMTR-MCNC: 146 MG/DL (ref 70–99)
GLUCOSE BLDC GLUCOMTR-MCNC: 152 MG/DL (ref 70–99)
GLUCOSE BLDC GLUCOMTR-MCNC: 176 MG/DL (ref 70–99)

## 2024-10-01 PROCEDURE — 97535 SELF CARE MNGMENT TRAINING: CPT | Mod: GO

## 2024-10-01 PROCEDURE — 97129 THER IVNTJ 1ST 15 MIN: CPT | Mod: GN

## 2024-10-01 PROCEDURE — 99232 SBSQ HOSP IP/OBS MODERATE 35: CPT | Mod: GC

## 2024-10-01 PROCEDURE — 97530 THERAPEUTIC ACTIVITIES: CPT | Mod: GP

## 2024-10-01 PROCEDURE — 250N000013 HC RX MED GY IP 250 OP 250 PS 637

## 2024-10-01 PROCEDURE — 92526 ORAL FUNCTION THERAPY: CPT | Mod: GN

## 2024-10-01 PROCEDURE — 97110 THERAPEUTIC EXERCISES: CPT | Mod: GO

## 2024-10-01 PROCEDURE — 97130 THER IVNTJ EA ADDL 15 MIN: CPT | Mod: GN

## 2024-10-01 PROCEDURE — 128N000003 HC R&B REHAB

## 2024-10-01 RX ADMIN — METFORMIN HYDROCHLORIDE 500 MG: 500 TABLET, EXTENDED RELEASE ORAL at 17:29

## 2024-10-01 RX ADMIN — INSULIN ASPART 1 UNITS: 100 INJECTION, SOLUTION INTRAVENOUS; SUBCUTANEOUS at 11:41

## 2024-10-01 RX ADMIN — APIXABAN 10 MG: 5 TABLET, FILM COATED ORAL at 21:07

## 2024-10-01 RX ADMIN — INSULIN ASPART 1 UNITS: 100 INJECTION, SOLUTION INTRAVENOUS; SUBCUTANEOUS at 09:54

## 2024-10-01 RX ADMIN — LOSARTAN POTASSIUM 25 MG: 25 TABLET, FILM COATED ORAL at 08:52

## 2024-10-01 RX ADMIN — INSULIN GLARGINE 15 UNITS: 100 INJECTION, SOLUTION SUBCUTANEOUS at 09:54

## 2024-10-01 RX ADMIN — APIXABAN 10 MG: 5 TABLET, FILM COATED ORAL at 08:52

## 2024-10-01 RX ADMIN — ATORVASTATIN CALCIUM 80 MG: 80 TABLET, FILM COATED ORAL at 21:07

## 2024-10-01 ASSESSMENT — ACTIVITIES OF DAILY LIVING (ADL)
ADLS_ACUITY_SCORE: 26

## 2024-10-01 NOTE — PLAN OF CARE
[id number]    Goal Outcome Evaluation:      Plan of Care Reviewed With: patient    Overall Patient Progress: no changeOverall Patient Progress: no change    Pt is alert & oriented  Regular diet, take medications whole  Continent of bowel & bladder  Last BM 9/30/24  Denies pain or shortness of breath  Assist of 1/ walker  HS blood sugar 172 mg/dl  Bed alarm on, call light within reach. Continue with POC

## 2024-10-01 NOTE — PROGRESS NOTES
[facility]   Acute Rehabilitation Unit  Daily Progress Note    INTERVAL HISTORY  Notes and labs reviewed over past 24 hours. No acute overnight events reported.  He reports continued to feel well, feels that his balance/coordination are improving.  We did discuss 2 episodes of loose stools that have occurred over the last 24 hours.  His bowel medication is being held at this time.    Additionally discussed the diabetes education consultation with the patient.  He states that someone can meet with him yesterday, but he told them that he was only on metformin and not insulin.  I reaffirmed with him that he would be discharging on insulin, as his A1c would likely not decreased with metformin alone.  Patient was understanding and agreeable to meeting with diabetes educator again.  No other concerns today.      ROS: 10 point ROS was assessed and was negative unless otherwise stated in HPI    Functionally,  With PT: 9/30  Current Status:  Bed Mobility: sba   Transfer: SBA/CGA, FWW in room, STC in therapy  Gait: up to 500 ft, STC, SBA/CGA.   Stairs: cga, 2 HR x 12 (6 inch )   Balance: intact steady state standing, needs UE support for walking/transfers.   Outcome Measures:   Maldonado- 9/29 = 34/56  FGA- 9/29 = 16/30     With OT: 10/1  Current Status:  ADLs:  Mobility: SBA/CGA FWW.  Grooming: SBA standing.  Dressing: UB Set up. LB SBA Feet Set up.  Bathing: CGA extended tub bench.  Toileting: SBA/CGA transfer and hygiene/clothing management with FWW and grab bar.   IADLs: IND at baseline in all but driving; family/GF can help at discharge   Vision/Cognition: M-ACE 24/30 (<21/30 impaired); vision appears intact per screening; wears readers    With SLP:  10/1  Current Status:  Hearing: WFL  Vision: Readers (at home, sig other brought in glasses that pt says aren't his, earpiece broken)  Communication: Motor speech, language intact  Cognition: Moderate cognitive impairment; attention,  reasoning, and memory deficits  Swallow: Regular, Thin liquids (0). Oral clearance strategies. Monitor for s/sx of infection, respiratory distress; if present VFSS likely inidicated.    MEDICATIONS  Scheduled meds  Current Facility-Administered Medications   Medication Dose Route Frequency Provider Last Rate Last Admin    apixaban ANTICOAGULANT (ELIQUIS) tablet 10 mg  10 mg Oral BID Hira Vasquez MD   10 mg at 09/30/24 2116    Followed by    [START ON 10/2/2024] apixaban ANTICOAGULANT (ELIQUIS) tablet 5 mg  5 mg Oral BID Hira Vasquez MD        atorvastatin (LIPITOR) tablet 80 mg  80 mg Oral QPM Hira Vasquez MD   80 mg at 09/30/24 2116    insulin aspart (NovoLOG) injection (RAPID ACTING)  1-7 Units Subcutaneous TID AC Hira Vasquez MD   1 Units at 09/30/24 1807    insulin aspart (NovoLOG) injection (RAPID ACTING)  1-5 Units Subcutaneous At Bedtime Hira Vasquez MD        insulin glargine (LANTUS PEN) injection 15 Units  15 Units Subcutaneous QAM AC Hira Vasquez MD   15 Units at 09/30/24 0839    losartan (COZAAR) tablet 25 mg  25 mg Oral Daily Hira Vasquez MD   25 mg at 09/30/24 0841    metFORMIN (GLUCOPHAGE XR) 24 hr tablet 500 mg  500 mg Oral Daily with supper Hira Vasquez MD   500 mg at 09/30/24 1807       PRN meds:  Current Facility-Administered Medications   Medication Dose Route Frequency Provider Last Rate Last Admin    acetaminophen (TYLENOL) tablet 650 mg  650 mg Oral Q4H PRN Hira Vasquez MD        benzocaine-menthol (CHLORASEPTIC) 6-10 MG lozenge 1 lozenge  1 lozenge Buccal Q1H PRN Hira Vasquez MD        calcium carbonate (TUMS) chewable tablet 1,000 mg  1,000 mg Oral TID PRN Hira Vasquez MD        glucose gel 15-30 g  15-30 g Oral Q15 Min PRN Hira Vasquez MD        Or    dextrose 50 % injection 25-50 mL  25-50 mL Intravenous Q15 Min PRN Hira Vasquez MD        Or    glucagon injection 1 mg  1 mg Subcutaneous Q15 Min PRN Hira Vasquez, MD        melatonin  "tablet 3 mg  3 mg Oral At Bedtime PRN Hira Vasquez MD        Patient is already receiving anticoagulation with heparin, enoxaparin (LOVENOX), warfarin (COUMADIN)  or other anticoagulant medication   Does not apply Continuous PRN Hira Vasquez MD        polyethylene glycol (MIRALAX) Packet 17 g  17 g Oral Daily PRN Hira Vasquez MD        senna-docusate (SENOKOT-S/PERICOLACE) 8.6-50 MG per tablet 2 tablet  2 tablet Oral BID PRN Hira Vasquez MD             PHYSICAL EXAM  /76 (BP Location: Right arm)   Pulse 81   Temp 98.7  F (37.1  C) (Oral)   Resp 17   Ht 1.727 m (5' 8\")   Wt 73.3 kg (161 lb 8 oz)   SpO2 97%   BMI 24.56 kg/m    General: in no acute distress, conversational and alert, resting comfortably in bed  HEENT: atraumatic, nares clear, conjunctiva white  Pulmonary: on room air, lungs clear to auscultation bilaterally  Cardiovascular: RRR, no murmur auscultated, well-perfused peripherally  Abdominal: soft, non-tender to palpation, non-distended  Extremities: warm peripherally, no edema in BLEs  Skin: warm, dry, without erythema, ecchymosis, or rash noted  MSK: no BLE edema noted, calves non-tender to palpation  Neuro: conjugate gaze, speech non-dysarthric, bilateral strength , elbow flexion, and elbow extension 5/5      LABS  Results for orders placed or performed during the hospital encounter of 09/27/24 (from the past 24 hour(s))   Glucose by meter   Result Value Ref Range    GLUCOSE BY METER POCT 122 (H) 70 - 99 mg/dL   Glucose by meter   Result Value Ref Range    GLUCOSE BY METER POCT 150 (H) 70 - 99 mg/dL   Glucose by meter   Result Value Ref Range    GLUCOSE BY METER POCT 163 (H) 70 - 99 mg/dL   Glucose by meter   Result Value Ref Range    GLUCOSE BY METER POCT 172 (H) 70 - 99 mg/dL   Glucose by meter   Result Value Ref Range    GLUCOSE BY METER POCT 140 (H) 70 - 99 mg/dL       ASSESSMENT AND PLAN    Francisco J Felipe is a 69 year old right hand dominant male with past medical " history of HTN, T2DM c/b diabetic foot ulcerations and left charcot foot, who initially presented to [facility] on 9/23 with a 3 day history of left sided facial droop, and unsteady gait. Stroke workup revealed a small acute infarct of the right hemipons. He was subsequently admitted to the  ARU on 9/27/2024 for ongoing rehabilitation. Deficits include impairments to mobility, ability to do ADLs, balance, coordination, cognition, processing, swallowing. Patient will require and benefit from PT, OT, and SLP services as well as all of the ancillary services offered in the inpatient rehab setting.       Updates Today:  - T2DM: Discussed management and plan to discharge on insulin.    - Bowel Incontinence: 2x episodes of loose stools. Will continue holding bowel medications for now.     Admission to acute inpatient rehab: 9/27/2024   Impairment group code: Stroke Ischemic 01.1 (L) Body Involvement (R) Brain; Acute ischemic stroke of right pontine area due to small-vessel occlusion and right frontal area due to ESUS      PT, OT and SLP 60 minutes of each on a daily basis, 6x a week,  in addition to rehab nursing and close management of physiatrist.     Impairment of ADL's:  OT for 60 min daily, 6x a week to work on upper and lower body self care, dressing, toileting, bathing, energy conservation techniques with use of ADs as needed   Impairment of mobility:   PT for 60 min daily, 6x a week  to work on gait exercises, strengthening, endurance buildup, transfers with use of walker as needed   Impairment of cognition/language/swallow:   SLP for 60 min daily, 6x a week  for cognitive evaluation and treatment strategies for higher level cognitive deficits and memory impairment   Rehab RN to administer medication, patient education on medication taking, VS monitoring, bowel regimen, glucose monitoring and wound care/surgical wound dressing changes and monitoring.     Medical Conditions  # Acute Ischemic Stroke of the Right  Robb shalom, ESUS (9/20/24)  # Dysphagia  # Hypertension  # Hyperlipidemia  Onset of symptoms 9/20/2024 with gait instability and facial droop. Stroke workup revealed acute infarction of the right robb-shalom. TTE on 9/24 did not demonstrate PFO or identifiable source of embolism. A1c=12.8%, Cholesterol 219, , Triglycerides 79. (9/23). Telemetry during acute stay did not identify atrial fibrillation. On admission his strength is overall well maintained. Left-sided coordination is mildly impaired. Dysphagia was noted at OSH.   - PT/OT/SLP  - Continue Lipitor 80 mg p.o. daily. Goal < 70.  - Continue losartan 25mg QD. Will likely increase in following days with BP goal < 130/80 mmhg.   - DAPT: Loaded with plavix on 9/24. Continue plavix 75 mg QD x3 months. ASA 81mg QD indefinitely.   - Ziopatch sent to home. Should apply after discharge.   - Outpatient stroke neurology follow up in 6-8 weeks.      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 No headache, no seizure history. 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  -Seizure precautions     # Common Femoral DVT, Left Lower Extremity (9/24/24)  # Deep Femoral DVT, Left Lower Extremity (9/24/24)  # Popliteal DVT, Left Lower Extremity (9/24/24)  # Peroneal Vein DVT, Left Lower Extremity (9/24/24)  Patient was found to have left lower extremity DVT on 9/24/2024.  Started on low-dose heparin drip at OSH prior to transitioning to eliquis. Ongoing LLE edema.   - Continue apixaban 5mg BID     # Constipation  # Vasovagal syncope  Patient experienced an episode of vasovagal syncope while having a bowel movement at OSH on 9/25. Symptoms included  bradycardia (HR=27 bpm), nausea, and vomiting.  and diaphoresis. Twelve-lead EKG was done and showed normal sinus rhythm with no acute changes. He reports  that his last good bowel movement was 1 week PTA. Several small BM's during acute hospitalization.   - Continue miralax 17 g daily PRN  - Senna/docusate 2 tabs BID PRN  - Bisacodyl suppository QD PRN       # Type 2 Diabetes Mellitus, Uncontrolled   # Charcot Foot, Left  Prior to acute hospitalization, patient had been taking metformin 500mg roughly every 3 days. A1C found to be 12.8% on 9/23/24.  - Continue metformin  mg p.o. QD  - Continue Lantus 15 units subQ qAM.  - Medium Dose sliding scale insulin  - BG checks TID + AC  - Orthotics order placed for left diabetic shoe.   - Hypoglycemia protocol   - Diabetes educator consultation placed. Appreciate involvement in patient's care. In order to decrease cost burdon of his new diabetes regimen, on discharge the patient will need the following prescribed:  Glucocard Vital Meter kit - $0   Glucocard Vital strips #100 - $15   TechLITE lancets #100 - $10   Sharps Container   Alcohol Wipes   B-D 4 mm gregory pen needles   Insulin Glargine-YFGN pens - $187/box   Insulin Lispro pens - $200/box     # Charcot joint of left lower extremity:  CROW boot has been recommended by podiatry during ambulation.  Can be utilized if patient is able to have this brought from home  -Fall precautions     # Adjustment to disability:  Will hold on ordering rehab psychology at this time, will monitor mood over course of rehab stay     FEN: Soft, easy to chew. Thin Liquids  DVT Prophylaxis: Apixaban 5mg BID  GI Prophylaxis:  None  Code: Full, Confirmed on admission  Disposition: TBD, to discuss at interdiscipinary rounds   ELOS/Discharge Date:  10/5/24.  Rehab prognosis:  Good   Follow up Appointments on Discharge:   Outpatient PCP.   Outpatient PM&R.   Outpatient therapies.   Stroke Neurology (6-8 weeks)    Seen and discussed with Dr. Castelan, PM&R staff physician     --   Hira Vasquez MD  [facility] PM&R PGY-2  Pager: [phone]

## 2024-10-01 NOTE — PLAN OF CARE
OT: Post rounds call made to pt's SO Radha. Gave Radha update on progress and also discussed recommendation for increased assist with all IADL including meals, laundry, meds and finances. Pt will follow up with OP at [facility]. Radha already has a tub  bench and is able to pick him up Thursday morning.

## 2024-10-01 NOTE — PROGRESS NOTES
Discharge Planner Post-Acute Rehab PT:     Discharge Plan: Home, lives with brother and sister. 3 VALERIE, rail. Likely OP PT at [facility] .    Precautions: falls, seizures.     Current Status:  Bed Mobility: SBA   Transfer: SBA with WW  Gait: SBA with WW  Stairs: SBA with B rails,     Outcome Measures:   Maldonado- 9/29 = 34/56  FGA- 9/29 = 16/30     Assessment: Grossly SBA with WW for all mobility. Appears stable with gait and stairs, even with positive distraction and cog-motor dual tasking.    Other Barriers to Discharge (DME, Family Training, etc):   Family and

## 2024-10-01 NOTE — PLAN OF CARE
Goal Outcome Evaluation:      Plan of Care Reviewed With: patient    Overall Patient Progress: no change    Outcome Evaluation: Pt asking questions about meds, continues to be safe using call light and waiting for assistance.    FOCUS/GOAL  Pain management     ASSESSMENT, INTERVENTIONS AND CONTINUING PLAN FOR GOAL:  Pt Aox4, using call light and waiting for assistance. A1 w/w.  Denies pain, cough, sob, chest pain, dizziness, n/v.  Pt is cont of B&B, LBM 30th.  Diabetic with QID BG and s/s insulin. Reg/thin diet, taking pills whole with water.  Working with therapies.  Nursing will continue with POC.

## 2024-10-01 NOTE — PLAN OF CARE
"Discharge Planner Post-Acute Rehab OT:     Discharge Plan: Home with family IADL support and OP therapy    Precautions: Fall, Seizure    Current Status:  ADLs:  Mobility: SBA/CGA FWW.  Grooming: SBA standing.  Dressing: UB Set up. LB SBA Feet Set up.  Bathing: CGA extended tub bench.  Toileting: SBA/CGA transfer and hygiene/clothing management with FWW and grab bar.   IADLs: IND at baseline in all but driving; family/GF can help at discharge   Vision/Cognition: M-ACE 24/30 (<21/30 impaired); vision appears intact per screening; wears readers    Assessment: Maybe discharge to gfs, unsure at this time. Tub/shower tf with ETB, SUP. Progressing well with ADLs and activity skye.     Other Barriers to Discharge (DME, Family Training, etc):   Caregiver support: Lives with siblings that can \"provide support as needed\". Recommend family call/update prior to discharge regarding recommendations.  Home set up: 3 VALERIE and all needs on main level.  Equipment: Shower chair, bathroom grab bars.  "

## 2024-10-01 NOTE — PROGRESS NOTES
"LYNSEY met with patient and completed IMM and IRF. Targeting discharge date of 10/03 (Thursday). Patient shared family will provide transportation between 11:00am-12:00PM on Thursday. Patient is very grateful and likes the help and support he has been receiving from [facility].   LYNSEY encouraged patient to signed up for Medicare Part D for prescription drug coverage.for future needs.Patient would be going home with Radha to [place]. He shared Radha would be his primary caregiver. Radha [phone]. Radha is patient's partner. No questions or concerns at the moment.      IRF-YUMIKO Pain Assessment    Pain Effect on Sleep  \"Over the past 5 days, how much of the time has pain made it hard for you to sleep at night?\"    1. Rarely or not at all     Pain Interference with Therapy Activities  \"Over the past 5 days, how often have you limited your participation in rehabilitation therapy sessions due to pain?\"   1. Rarely or not at all    Pain Interference with Day-to-Day Activities  \"Over the past 5 days, how often have you limited your day-to-day activities (excluding rehabilitation therapy sessions) because of pain?\"   1. Rarely or not at all        LUIS Matos  [facility], Acute Inpatient Rehab Unit   [facility] [address] [place] [address]  Phone: [phone]  Fax: [phone]          "

## 2024-10-01 NOTE — PLAN OF CARE
Acute Rehab Care Conference/Team Rounds      Type: Team Rounds    Present: Pedro Santoro Resident,  Emerita Murray PT, Sona Farooq OT, Maria E Dey SLP, Elizabeth Desai , Natasha Restrepo RD, Venice DONOVAN RN, Matteo Marx V Patient.       Discharge Barriers/Treatment/Education    Rehab Diagnosis: post CVA     Active Medical Co-morbidities/Prognosis:     Patient Active Problem List   Diagnosis    Intracranial atherosclerosis    Cerebral venous sinus thrombosis    Posterior circulation stroke (H)    Uncontrolled type 2 diabetes mellitus with hyperglycemia (H)    Ischemic stroke (H)         Safety: Patient is alert and oriented, calls to make needs known.     Pain: No pain or discomfort reported overnight. No PRN given/requested.    Medications, Skin, Tubes/Lines: Takes pills whole. No skin issues per nursing report.     Swallowing/Nutrition: Regular, Thin liquids (0). Monitor for s/sx of infection, respiratory distress; if present VFSS likely inidicated.     Bowel/Bladder: Continent of bowel and bladder. Last BM 10/01.    Psychosocial: Home, lives with brother and sister. Patient lives with his brother in their rambler house. There are 3 steps to enter, otherwise bed and bath rooms are on the main level.     ADLs/IADLs: Pt is SBA with walker for ADL, need to progress pt to mod I prior to discharge and make recommendation for bathroom DME. Recommend assist with IADL as needed and to follow up with OP OT.    Mobility:   mod I is goal     Cognition/Language: Moderate cognitive impairment; attention, reasoning, and memory deficits. Noting some difficulties with visual attention. Will benefit from IADL assist and ongoing SLP    Community Re-Entry: reach a level of mod I     Transportation: will need assistance     Decision maker: self    Plan of Care and goals reviewed and updated.    Discharge Plan/Recommendations    Fall Precautions: continue    Patient/Family input to goals: yes      Estimated length of stay: 12 days     Overall plan for the patient: reach a level of mod I       Utilization Review and Continued Stay Justification    Medical Necessity Criteria:    For any criteria that is not met, please document reason and plan for discharge, transfer, or modification of plan of care to address.    Requires intensive rehabilitation program to treat functional deficits?: Yes    Requires 3x per week or greater involvement of rehabilitation physician to oversee rehabilitation program?: Yes    Requires rehabilitation nursing interventions?: Yes    Patient is making functional progress?: Yes    There is a potential for additional functional progress? Yes    Patient is participating in therapy 3 hours per day a minimum of 5 days per week or 15 hours per week in 7 day period?:Yes    Has discharge needs that require coordinated discharge planning approach?:Yes      Barriers/Concerns related to meeting medical necessity criteria:  None     Team Plan to Address Concern:  As needed      Final Physician Sign off    Statement of Approval:     I agree with all the recommendations detailed in this document.    Wilfred Castelan, DO      Patient Goals    Social Work Goals: Confirm discharge recommendations with therapy, coordinate safe discharge plan and remain available to support and assist as needed.     OT Predicted Duration/Target Date for Goal Attainment: 10/03/24  Therapy Frequency (OT): 6 times/week  OT: Hygiene/Grooming: independent  OT: Upper Body Dressing: Independent  OT: Lower Body Dressing: Independent  OT: Upper Body Bathing: Modified independent  OT: Lower Body Bathing: Modified independent  OT: Bed Mobility: Independent  OT: Transfer: Modified independent  OT: Toilet Transfer/Toileting: Modified independent  OT: Meal Preparation: Modified independent  OT: Home Management: Modified independent     PT Predicted Duration/Target Date for Goal Attainment: 10/03/24  PT Frequency: 6x/week  PT:  Bed Mobility: Independent  PT: Transfers: Independent  PT: Gait: Modified independent, Straight cane, Greater than 200 feet  PT: Stairs: Modified independent, 3 stairs, Rail on left        PT: Goal 1: Pt able to perform a car tx with cane and supervision  PT: Goal 2: Pt able to perform fall recovery tx with furniture and sba.  PT: Goal 3: Pt able to state 3 fall prevention strategies after participating in fall prevention training fo rincreased safety at home.  PT: Goal 4: Pt able to perform HEP Hali for strengthening, balance for improved function at home.                SLP Predicted Duration/Target Date for Goal Attainment: 10/05/24  Therapy Frequency (SLP Eval): 6 times/week  SLP: Safely tolerate diet without signs/symptoms of aspiration: Regular diet, Thin liquids  SLP: Goal 1: Patient will complete moderate level attention (selective, alternating) tasks with 85% or greater independent accuracy.  SLP: Goal 2: Patient will complete moderate complexity reasoning/problem solving tasks with 90% or greater independent accuracy.  SLP: Goal 3: With use of trained memory strategies, pt will recall functional/daily information with 90% or greater independent accuracy.        Nursing Goals  Bowel and Bladder care  Fall prevention   Medication Education  Skin Care protection

## 2024-10-01 NOTE — PLAN OF CARE
Discharge Planner Post-Acute Rehab SLP:     Discharge Plan: discharge to home with family, ongoing SLP for cognition    Precautions: Fall    Current Status:  Hearing: WFL  Vision: Readers (at home, sig other brought in glasses that pt says aren't his, earpiece broken)  Communication: Motor speech, language intact  Cognition: Moderate cognitive impairment; attention, reasoning, and memory deficits  Swallow: Regular, Thin liquids (0). Oral clearance strategies. Monitor for s/sx of infection, respiratory distress; if present VFSS likely inidicated.    Assessment:   Completed remaining subtest of MARILU, reading medication labels with 90% IND, 100% min cues. Pt engaged in task, organizing items within field given prompts. Introduced to strategies to aid organization and accuracy. Noting impaired alternating attention, reaosning, and some reading comprehension. Benefited from cues to reduce field to improve visual attention. Required overall min-mod cues to complete with 100% accuracy      PM: Pt seen with meal regular texture, thin (0) liquid. Pt able to recall previously introduced strategies for oral clearance and safety with intake. Pt with prolonged mastication, delayed AP transit, mod oral residuals. Noting distractability across meal as often talking with bolus in oral cavity. When redirected to focus on bolus, pt able to clear with extra time and onset of strategies. Pt aware of residuals and clearing following extra time. despite this, no overt s/sx aspiration across entire meal. Recommend continue current diet and use of clearance strategies. may passively continue to monitor safety with solids throughout remaining stay on ARU     Other Barriers to Discharge (Family Training, etc): level of assist/supervision with iADLs.

## 2024-10-02 ENCOUNTER — APPOINTMENT (OUTPATIENT)
Dept: PHYSICAL THERAPY | Facility: CLINIC | Age: 69
DRG: 057 | End: 2024-10-02
Attending: PHYSICAL MEDICINE & REHABILITATION
Payer: MEDICARE

## 2024-10-02 ENCOUNTER — DOCUMENTATION ONLY (OUTPATIENT)
Dept: REHABILITATION | Facility: CLINIC | Age: 69
End: 2024-10-02

## 2024-10-02 ENCOUNTER — APPOINTMENT (OUTPATIENT)
Dept: SPEECH THERAPY | Facility: CLINIC | Age: 69
DRG: 057 | End: 2024-10-02
Attending: PHYSICAL MEDICINE & REHABILITATION
Payer: MEDICARE

## 2024-10-02 ENCOUNTER — APPOINTMENT (OUTPATIENT)
Dept: OCCUPATIONAL THERAPY | Facility: CLINIC | Age: 69
DRG: 057 | End: 2024-10-02
Attending: PHYSICAL MEDICINE & REHABILITATION
Payer: MEDICARE

## 2024-10-02 LAB
GLUCOSE BLDC GLUCOMTR-MCNC: 133 MG/DL (ref 70–99)
GLUCOSE BLDC GLUCOMTR-MCNC: 153 MG/DL (ref 70–99)
GLUCOSE BLDC GLUCOMTR-MCNC: 156 MG/DL (ref 70–99)
GLUCOSE BLDC GLUCOMTR-MCNC: 167 MG/DL (ref 70–99)

## 2024-10-02 PROCEDURE — 97530 THERAPEUTIC ACTIVITIES: CPT | Mod: GP

## 2024-10-02 PROCEDURE — 128N000003 HC R&B REHAB

## 2024-10-02 PROCEDURE — 99232 SBSQ HOSP IP/OBS MODERATE 35: CPT | Performed by: PHYSICAL MEDICINE & REHABILITATION

## 2024-10-02 PROCEDURE — 250N000013 HC RX MED GY IP 250 OP 250 PS 637

## 2024-10-02 PROCEDURE — 97535 SELF CARE MNGMENT TRAINING: CPT | Mod: GO

## 2024-10-02 PROCEDURE — 97112 NEUROMUSCULAR REEDUCATION: CPT | Mod: GP

## 2024-10-02 PROCEDURE — 97130 THER IVNTJ EA ADDL 15 MIN: CPT | Mod: GN | Performed by: SPEECH-LANGUAGE PATHOLOGIST

## 2024-10-02 PROCEDURE — 97129 THER IVNTJ 1ST 15 MIN: CPT | Mod: GN | Performed by: SPEECH-LANGUAGE PATHOLOGIST

## 2024-10-02 RX ORDER — INSULIN GLARGINE-YFGN 100 [IU]/ML
15 INJECTION, SOLUTION SUBCUTANEOUS EVERY MORNING
Qty: 15 ML | Refills: 0 | Status: SHIPPED | OUTPATIENT
Start: 2024-10-02

## 2024-10-02 RX ORDER — ATORVASTATIN CALCIUM 80 MG/1
80 TABLET, FILM COATED ORAL EVERY EVENING
Qty: 90 TABLET | Refills: 0 | Status: SHIPPED | OUTPATIENT
Start: 2024-10-02

## 2024-10-02 RX ORDER — METFORMIN HCL 500 MG
500 TABLET, EXTENDED RELEASE 24 HR ORAL
Qty: 30 TABLET | Refills: 0 | Status: SHIPPED | OUTPATIENT
Start: 2024-10-02

## 2024-10-02 RX ORDER — LOSARTAN POTASSIUM 25 MG/1
25 TABLET ORAL DAILY
Qty: 30 TABLET | Refills: 0 | Status: SHIPPED | OUTPATIENT
Start: 2024-10-03

## 2024-10-02 RX ORDER — POLYETHYLENE GLYCOL 3350 17 G/17G
17 POWDER, FOR SOLUTION ORAL DAILY
Qty: 510 G | Refills: 0 | Status: SHIPPED | OUTPATIENT
Start: 2024-10-02

## 2024-10-02 RX ADMIN — LOSARTAN POTASSIUM 25 MG: 25 TABLET, FILM COATED ORAL at 08:02

## 2024-10-02 RX ADMIN — METFORMIN HYDROCHLORIDE 500 MG: 500 TABLET, EXTENDED RELEASE ORAL at 17:39

## 2024-10-02 RX ADMIN — INSULIN GLARGINE 15 UNITS: 100 INJECTION, SOLUTION SUBCUTANEOUS at 08:02

## 2024-10-02 RX ADMIN — APIXABAN 5 MG: 5 TABLET, FILM COATED ORAL at 08:02

## 2024-10-02 RX ADMIN — APIXABAN 5 MG: 5 TABLET, FILM COATED ORAL at 21:10

## 2024-10-02 RX ADMIN — ATORVASTATIN CALCIUM 80 MG: 80 TABLET, FILM COATED ORAL at 21:10

## 2024-10-02 RX ADMIN — INSULIN ASPART 1 UNITS: 100 INJECTION, SOLUTION INTRAVENOUS; SUBCUTANEOUS at 17:38

## 2024-10-02 ASSESSMENT — ACTIVITIES OF DAILY LIVING (ADL)
ADLS_ACUITY_SCORE: 26

## 2024-10-02 NOTE — PLAN OF CARE
Goal Outcome Evaluation:      Plan of Care Reviewed With: patient    Overall Patient Progress: no change    Outcome Evaluation: Pt asking questions about meds, continues to be safe using call light and waiting for assistance.    FOCUS/GOAL  Pain management     ASSESSMENT, INTERVENTIONS AND CONTINUING PLAN FOR GOAL:  Pt Aox4, using call light and waiting for assistance. Upgraded to YAAKOV in room with walker today.  Denies pain, cough, sob, chest pain, dizziness, n/v.  Pt is cont of B&B, LBM 10/1.  Diabetic with QID BG and s/s insulin. Reg/thin diet, taking pills whole with water.  Working with therapies.  Nursing will continue with POC.

## 2024-10-02 NOTE — PROGRESS NOTES
FREE FILL VOUCHER OBTAINED    Medication: ELIQUIS 5 MG PO TABS  Sponsor: [employer]  Member ID: [id number]  BIN: [id number]  PCN: [id number]  Group: [id number]  Expected Copay: $ 0  Copay card Monthly Max Amount: $ 614.73  Copay card Annual Amount: $ 614.73        Edie Larson CP  Discharge Pharmacy Liaison  [facility]  Pronouns: She/Her/Hers    Securely message with Tink, Epic Secure Chat, or Flexiant  Phone: [phone]  Fax: [phone]  [email or web address]

## 2024-10-02 NOTE — PROGRESS NOTES
URGENT NEEDS VOUCHER OBTAINED    Medication: SEMGLEE (YFGN) 100 UNIT/ML SC SOPN  Sponsor: Devon  Member ID: [id number]  BIN: [id number]  PCN: [id number]  Group: [id number]  Expected Copay: $ 0  Copay card Monthly Max Amount: $ 520.34  Copay card Annual Amount: $ 520.34  Comments: Uninsured. [place] Urgent Needs voucher at discharge pharmacy for one free fill. Can only use an Urgent Needs [place]cher once per year per insulin.        Edie Larson CPhT  Discharge Pharmacy Liaison  [facility]  Pronouns: She/Her/Hers    Securely message with [employer], Epic Secure Chat, or [employer]  Phone: [phone]  Fax: [phone]  [email or web address]

## 2024-10-02 NOTE — PLAN OF CARE
Goal Outcome Evaluation:      Plan of Care Reviewed With: patient    Overall Patient Progress: no change    Patient slept throughout the night. Independent with bed mobility. No pain or discomfort reported. No PRN given/requested. Comfortable in bed. No new concerns voiced thus far. Fall precautions maintained, call light in reach, safety checks completed.    Continue with POC.

## 2024-10-02 NOTE — PROGRESS NOTES
URGENT NEEDS VOUCHER OBTAINED    Medication: INSULIN LISPRO (1 UNIT DIAL) 100 UNIT/ML SC SOPN  Sponsor: Gamaliel  Member ID: [id number]  BIN: [id number]  PCN: CONSTANZA  Group: [id number]  Expected Copay: $ 0  Copay card Monthly Max Amount: $ 200.16  Copay card Annual Amount: $ 200.16  Comments: Uninsured. [place] Urgent Needs voucher at discharge pharmacy for one free fill. Can only use an Urgent Needs [place] voucher once per year per insulin.        Edie Larson CPhT  Discharge Pharmacy Liaison  [facility] Discharge Pharmacy  Pronouns: She/Her/Hers    Securely message with [employer], Epic Secure Chat, or [employer]  Phone: [phone]  Fax: [phone]  [email or web address]

## 2024-10-02 NOTE — PLAN OF CARE
Discharge Planner Post-Acute Rehab SLP:     Discharge Plan: discharge to home with family, ongoing SLP for cognition    Precautions: Fall    Current Status:  Hearing: WFL  Vision: Readers (at home, sig other brought in glasses that pt says aren't his, earpiece broken)  Communication: Motor speech, language intact  Cognition: Moderate cognitive impairment; attention, reasoning, and memory deficits  Swallow: Regular, Thin liquids (0). Oral clearance strategies. Monitor for s/sx of infection, respiratory distress; if present VFSS likely inidicated.    Assessment: Instructed pt in calendar reading task of MARILU, pt completed with 90% independent accuracy, increased to 100% with max cues from SLP. Instructed pt in reading directions task of MARIUL, 2x questions not given; pt completed with 75% independent accuracy, with mild to max cues 100%.     Other Barriers to Discharge (Family Training, etc): level of assist/supervision with iADLs.

## 2024-10-02 NOTE — PROGRESS NOTES
$35/MO CASH-PAY COPAY CARD OBTAINED    Medication: NOVOLOG FLEXPEN 100 UNIT/ML SC SOPN (also good for: Insulin Aspart, Levemir, Tresiba, Novolin, & Insulin Degludec)  Sponsor: [employer]  Member ID: [id number]  BIN: [id number]  PCN: [id number]  Group: [id number]  Expected Copay: $ 35  Copay card Monthly Max Amount: $ 134.67  Copay card Annual Amount: $ 1,616.04  Comments: Uninsured. Set up $35/mo cash pay copay card.             Edie Larson CPhT  Discharge Pharmacy Liaison  [facility] Discharge Pharmacy  Pronouns: She/Her/Hers    Securely message with [employer], Epic Secure Chat, or [employer]  Phone: [phone]  Fax: [phone]  [email or web address]

## 2024-10-02 NOTE — PROGRESS NOTES
$35/MO CASH-PAY COPAY CARD OBTAINED    Medication: INSULIN LISPRO (1 UNIT DIAL) 100 UNIT/ML SC SOPN (also good for: Humalog, Basaglar, Humulin, & Lyumjev)  Sponsor: Mary/Blanquita  Member ID: [id number]  BIN: [id number]  PCN: CONSTANZA  Group: [id number]  Expected Copay: $ 35  Copay card Monthly Max Amount: $ 200.16  Copay card Annual Amount: $ 2,401.92  Comments: Uninsured. Set up $35/mo cash pay copay card.        Edie Larson Anahi  Discharge Pharmacy Liaison  [facility] Discharge Pharmacy  Pronouns: She/Her/Hers    Securely message with [employer], Epic Secure Chat, or [email or web address]  Phone: [phone]  Fax: [phone]  [email or web address]

## 2024-10-02 NOTE — PROGRESS NOTES
FREE METER VOUCHER OBTAINED    Medication: ACCU-CHEK GUIDE ME W/DEVICE KIT  Sponsor: Shawn/[employer]  Member ID: [id number]  BIN: [id number]  PCN: [id number]  Group: [id number]  Expected Copay: $ 0  Copay card Monthly Max Amount: $ 39.29  Copay card Annual Amount: $ 39.29  Comments: Uninsured. Voucher for free Accu-chek Guide Me glucometer.      Edie Larson [facility]  Discharge Pharmacy Liaison  [facility] Discharge Pharmacy  Pronouns: She/Her/Hers    Securely message with [employer], Epic Secure Chat, or [employer]  Phone: [phone]  Fax: [phone]  [email or web address]

## 2024-10-02 NOTE — DISCHARGE INSTRUCTIONS
Long-Acting Insulin Plan:  - 15u insulin lantus each morning.     Short Acting Insulin Plan:  - Continue Novalog with following schedule:  Do Not give Correction Insulin if Pre-Meal BG less than 140.   For Pre-Meal  - 189 give 1 unit.   For Pre-Meal  - 239 give 2 units.   For Pre-Meal  - 289 give 3 units.   For Pre-Meal  - 339 give 4 units.   For Pre-Meal - 389 give 5 units.   For Pre-Meal -439 give 6 units   For Pre-Meal BG greater than or equal to 440 give 7 units.   To be given with prandial insulin, and based on pre-meal blood glucose. Administering insulin within 5 minutes of the start of the meal is ideal. Administer insulin no more than 30 minutes after the start of the meal       Follow up appointments    PCP  You are scheduled to see EVANS Washburn on 10/11/2024 at 8:30am.    Address [address] [place] [address], [address]   [facility] [address]   Phone  [phone]     PM&R  You are scheduled to see Dr. Chinchlila on 11/25/2024 at 8:00am.    Address [address] [place] [address] [address] [facility] [address]   Phone  [phone]     Neurology -You will be contacted if an earlier appointment becomes available  You are scheduled to see Dr. Ortega on 1/31/2025 at 8:30am.    Address [address] [place] [address], [address]   [facility] [address]   Phone  [phone]        Continue glucose checks before meals and before bedtime.   - Continue Metformin  mg p.o. QD  - Continue Lantus 15 units subQ qAM.  - Continue Novolog sliding scale 3 times daily with meals and at bedtime with following doses:    For mealtime:  Do Not give Correction Insulin if Pre-Meal BG less than 140.   For Pre-Meal  - 189 give 1 unit.   For Pre-Meal  - 239 give 2 units.   For Pre-Meal  - 289 give 3 units.   For Pre-Meal  - 339 give 4 units.   For Pre-Meal - 389 give 5 units.   For Pre-Meal -439 give 6 units   For Pre-Meal BG greater than or equal to 440 give 7 units.   To be  given with prandial insulin, and based on pre-meal blood glucose. Administering insulin within 5 minutes of the start of the meal is ideal. Administer insulin no more than 30 minutes after the start of the meal     At bedtime:  MEDIUM INSULIN RESISTANCE DOSING     Do Not give Bedtime Correction Insulin if BG less than  200.   For  - 249 give 1 units.   For  - 299 give 2 units.   For  - 349 give 3 units.   For  -399 give 4 units.   For BG greater than or equal to 400 give 5 units.   Notify provider if glucose greater than or equal to 350 mg/dL after administration of correction dose.

## 2024-10-02 NOTE — PLAN OF CARE
Goal Outcome Evaluation:      Plan of Care Reviewed With: patient    Overall Patient Progress: improving    Patient is alert and oriented x4. Mod I ambulates in the room. Takes medications whole, regular diet, thin liquids. Calls appropriately and can make needs known. Continent of bowel and bladder. Last bm 10/01/2024. Blood sugar check on sliding scale. Bed alarm on for safety. Call light within reach. Will continue with POC.

## 2024-10-02 NOTE — PLAN OF CARE
Goal Outcome Evaluation:      Plan of Care Reviewed With: patient    Overall Patient Progress: no changeOverall Patient Progress: no change    Outcome Evaluation: Pt is alert and oriented, call light appropriate. Denies CP. sob, dizziness or lightheadedness. On regular diet, takes pills whole with water. He is continent of both bladder and bowel His last BM was this morning. HS blood suagr is 176, no coverage needed. Remains on seizure precautions. Bed alarm on and call light in reach.

## 2024-10-02 NOTE — PROGRESS NOTES
Physical Therapy Discharge Summary    Reason for therapy discharge:    Discharged to home with outpatient therapy.    Progress towards therapy goal(s). See goals on Care Plan in [facility] electronic health record for goal details.  Goals met    Therapy recommendation(s):    Continued therapy is recommended.  Rationale/Recommendations:  ..    Discharge Plan: Home with OP PT    Precautions: seizures    Current Status: MOD I in room with FWW  Bed Mobility: IND  Transfer: MOD I with WW  Gait: MOD I with WW  Stairs: MOD I with B rails    Outcome Measures:   Maldonado - 9/29 = 34/56  FGA - 9/29 = 16/30     Assessment: Pt is now safe to discharge to home with family IADL assist. He has ongoing balance and endurance deficit below baseline, and will follow up at [facility] for ongoing OP PT follow-up.

## 2024-10-02 NOTE — PROGRESS NOTES
"Speech Language Therapy Discharge Summary    Reason for therapy discharge:    Discharged to home with outpatient therapy.    Progress towards therapy goal(s). See goals on Care Plan in [facility] electronic health record for goal details.  Goals partially met.  Barriers to achieving goals:   discharge from facility.    Therapy recommendation(s):    Continued therapy is recommended.  Rationale/Recommendations:  Ongoing SLP for cognition.    Status 10/02:  Hearing: WFL  Vision: Readers   Communication: Motor speech, language intact  Cognition: Moderate cognitive impairment; attention, reasoning, and memory deficits  Swallow: Regular, Thin liquids (0). Oral clearance strategies. Monitor for s/sx of infection, respiratory distress; if present VFSS likely indicated Pt with no s/sx of infection, respiratory issues while at ARU.    RBANS 09/29  Repeatable Battery for the Assessment of Neuropsychological Status (RBANS) FORM A    Immediate Memory Visuospatial/  Constructional Language Attention Delayed Memory Total Scale   Index Score 49 69 85 53 81 59   Percentile Rank <0.1 2 16 0.1 10 0.3      Clinical impressions: \"RBANS form A administered and interpreted. Pt's overall score \"extremely low\" for age range. Pt's delayed recall less impaired than immediate recall. Attention domain score lower than anticipated. Skilled SLP services indicated to train in cognitive strategies, promote cognitive skills development with therapeutic activities.\"     "

## 2024-10-02 NOTE — DISCHARGE SUMMARY
[facility]   Acute Rehabilitation Unit  Discharge summary     Date of Admission: 9/27/2024  Date of Discharge: 10/3/2024  Disposition: Home  Primary Care Physician: No Ref-Primary, Physician  Attending physician: Wilfred Castelan DO  Other significant physician provider(s): Hira Vasquez MD      DISCHARGE DIAGNOSIS    Admission to acute inpatient rehab: 9/27/2024   Impairment group code: Stroke Ischemic 01.1 (L) Body Involvement (R) Brain; Acute ischemic stroke of right pontine area due to small-vessel occlusion and right frontal area due to ESUS     PT, OT and SLP 60 minutes of each on a daily basis, 6x a week,  in addition to rehab nursing and close management of physiatrist.     Impairment of ADL's:  OT for 60 min daily, 6x a week to work on upper and lower body self care, dressing, toileting, bathing, energy conservation techniques with use of ADs as needed   Impairment of mobility:   PT for 60 min daily, 6x a week  to work on gait exercises, strengthening, endurance buildup, transfers with use of walker as needed   Impairment of cognition/language/swallow:   SLP for 60 min daily, 6x a week  for cognitive evaluation and treatment strategies for higher level cognitive deficits and memory impairment   Rehab RN to administer medication, patient education on medication taking, VS monitoring, bowel regimen, glucose monitoring and wound care/surgical wound dressing changes and monitoring.       # Acute Ischemic Stroke of the Right Robb shalom, ESUS (9/20/24)  # Dysphagia  # Hypertension  # Hyperlipidemia  # Left transverse sigmoid and sigmoid sinus occlusion:   # Common Femoral DVT, Left Lower Extremity (9/24/24)  # Deep Femoral DVT, Left Lower Extremity (9/24/24)  # Popliteal DVT, Left Lower Extremity (9/24/24)  # Peroneal Vein DVT, Left Lower Extremity (9/24/24)  # Constipation  # Vasovagal syncope  # Type 2 Diabetes Mellitus, Uncontrolled   # Charcot Foot, Left    BRIEF  SUMMARY  Francisco J Felipe is a 69 year old male with pmh HTN, T2DM c/b diabetic foot ulcers and charcot foot, who [facility] to [facility] department on 9/23 with a 3 day history of left-sided facial droop and unsteady gait. Stroke workup was initiated and MRI brain showed acute CVA of the right Robb shalom. He also was noted to have Left transverse sigmoid and sigmoid sinus occlusion, without headache. 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 He was subsequently started on IV heparin drip per neurology recommendation on 9/24/2024.  He was switched to oral Eliquis on 9/27. Hospital course was further complicated by vasovagal syncope suspected due to constipation; pt was started on miralax and lactulose. He was admitted to the [facility] acute rehabilitation unit on 9/27/24.    REHABILITATION COURSE  Patient progressed well through physical therapy, Occupational Therapy and speech-language pathology appointments during his acute rehabilitation stay.  He did not have any factors that significantly limited his participation in therapy appointments.  He additionally received diabetes education due to his rising A1c to 12.8%, and need for consistent insulin usage.  At time of discharge, Mr. Felipe was fit to return to his previous living situation with assistance in IADLs from family members.  He was tolerating a regular diet with thin liquids.    MEDICAL COURSE    # Acute Ischemic Stroke of the Right Robb shalom, ESUS (9/20/24)  # Dysphagia  # Hypertension  # Hyperlipidemia  Onset of symptoms 9/20/2024 with gait instability and facial droop. Stroke workup revealed acute infarction of the right robb-shalom. TTE on 9/24 did not demonstrate PFO or identifiable source of embolism.  A1c=12.8%, Cholesterol 219, , Triglycerides 79. (9/23). Telemetry during acute stay did not identify atrial fibrillation. On admission his strength was overall well maintained. Left-sided coordination was mildly impaired. Dysphagia was noted at OSH but he tolerated a regular diet with thin liquids during his rehab stay   - Outpatient PT/OT/SLP  - Continue Lipitor 80 mg p.o. daily. Goal LDL < 70.  - Continue losartan 25mg QD. Was not increased during ARU stay due to BP being within range.   - DAPT: Loaded with plavix on 9/24. Continue plavix 75 mg QD x3 months. ASA 81mg QD indefinitely.   - Ziopatch sent to home. Should apply after discharge.   - Outpatient stroke neurology follow up in 6-8 weeks.        # Type 2 Diabetes Mellitus, Uncontrolled   # Charcot Foot, Left  Prior to acute hospitalization, patient had been taking metformin 500mg roughly every 3 days. A1C found to be 12.8% on 9/23/24 and patient was subsequently started on insulin. Blood glucose was well controlled during the acute hospitalization as well as during his rehabilitation stay.   - Continue glucose checks before meals and before bedtime.   - Continue Metformin  mg p.o. QD  - Continue Lantus 15 units subQ qAM.  - Continue Novalog with following schedule:  Do Not give Correction Insulin if Pre-Meal BG less than 140.   For Pre-Meal  - 189 give 1 unit.   For Pre-Meal  - 239 give 2 units.   For Pre-Meal  - 289 give 3 units.   For Pre-Meal  - 339 give 4 units.   For Pre-Meal - 389 give 5 units.   For Pre-Meal -439 give 6 units   For Pre-Meal BG greater than or equal to 440 give 7 units.   To be given with prandial insulin, and based on pre-meal blood glucose. Administering insulin within 5 minutes of the start of the meal is ideal. Administer insulin no more than 30 minutes after the start of the meal     - Based off of patient's insurance status, he was discharged with a 1 month supply of the following:   Glucocard Vital Meter kit - $0, Glucocard Vital strips #100 - $15,    , TechLITE lancets #100 - $10, Sharps Container, Alcohol Wipes, B-D 4 mm gregory pen needles, Insulin Glargine-YFGN pens - $187/box, and Insulin Lispro pens - $200/box on discharge.   - Further T2DM management per PCP during appointment on 11/11.     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 No headache, no seizure history. 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     # Common Femoral DVT, Left Lower Extremity (9/24/24)  # Deep Femoral DVT, Left Lower Extremity (9/24/24)  # Popliteal DVT, Left Lower Extremity (9/24/24)  # Peroneal Vein DVT, Left Lower Extremity (9/24/24)  Patient was found to have left lower extremity DVT on 9/24/2024.  Started on low-dose heparin drip at OSH prior to transitioning to eliquis. Ongoing LLE edema.   - Continue apixaban 5mg BID until 12/24/24. Discontinuation at that point will be determined by PCP.      # Constipation  # Vasovagal syncope  Patient experienced an episode of vasovagal syncope while having a bowel movement at OSH on 9/25. Symptoms included  bradycardia (HR=27 bpm), nausea, and vomiting.  and diaphoresis. Twelve-lead EKG was done and showed normal sinus rhythm with no acute changes. During his stay in the ARU, he intermittently had loose stools. No additional episodes of constipation.   - Continue miralax 17 g daily PRN         DISCHARGE MEDICATIONS  Current Discharge Medication List        START taking these medications    Details   blood glucose (NO BRAND SPECIFIED) lancets standard Use to test blood sugar 4 (before meals and before bedtime) times daily or as directed.  Qty: 100 Lancet, Refills: 0    Associated Diagnoses: Uncontrolled type 2 diabetes mellitus with hyperglycemia (H); Ischemic stroke (H); Hyperlipidemia LDL goal <70;  Posterior circulation stroke (H); Cerebral venous sinus thrombosis; Intracranial atherosclerosis      blood glucose (NO BRAND SPECIFIED) test strip Use to test blood sugar 4 times daily (Before meals and before bedtime) or as directed.  Qty: 100 strip, Refills: 0    Associated Diagnoses: Uncontrolled type 2 diabetes mellitus with hyperglycemia (H); Ischemic stroke (H); Hyperlipidemia LDL goal <70; Posterior circulation stroke (H); Cerebral venous sinus thrombosis; Intracranial atherosclerosis      blood glucose monitoring (NO BRAND SPECIFIED) meter device kit Use to test blood sugar 2 times daily or as directed.  Qty: 1 kit, Refills: 0    Associated Diagnoses: Uncontrolled type 2 diabetes mellitus with hyperglycemia (H); Ischemic stroke (H); Hyperlipidemia LDL goal <70; Posterior circulation stroke (H); Cerebral venous sinus thrombosis; Intracranial atherosclerosis      !! insulin aspart (NOVOLOG PEN) 100 UNIT/ML pen Inject 1-7 Units subcutaneously 3 times daily (before meals).  Qty: 15 mL, Refills: 0    Associated Diagnoses: Uncontrolled type 2 diabetes mellitus with hyperglycemia (H)      !! insulin aspart (NOVOLOG PEN) 100 UNIT/ML pen Inject 1-5 Units subcutaneously at bedtime.  Qty: 15 mL, Refills: 0    Associated Diagnoses: Uncontrolled type 2 diabetes mellitus with hyperglycemia (H)      Insulin Glargine-yfgn 100 UNIT/ML SOPN Inject 15 Units subcutaneously every morning.  Qty: 15 mL, Refills: 0    Associated Diagnoses: Uncontrolled type 2 diabetes mellitus with hyperglycemia (H); Ischemic stroke (H); Hyperlipidemia LDL goal <70; Posterior circulation stroke (H); Cerebral venous sinus thrombosis; Intracranial atherosclerosis      insulin pen needle (32G X 4 MM) 32G X 4 MM miscellaneous Use 2 pen needles daily or as directed.  Qty: 100 each, Refills: 0    Associated Diagnoses: Uncontrolled type 2 diabetes mellitus with hyperglycemia (H); Ischemic stroke (H); Hyperlipidemia LDL goal <70; Posterior circulation  stroke (H); Cerebral venous sinus thrombosis; Intracranial atherosclerosis       !! - Potential duplicate medications found. Please discuss with provider.        CONTINUE these medications which have CHANGED    Details   apixaban ANTICOAGULANT (ELIQUIS) 5 MG tablet Take 1 tablet (5 mg) by mouth 2 times daily.  Qty: 60 tablet, Refills: 0    Associated Diagnoses: Uncontrolled type 2 diabetes mellitus with hyperglycemia (H); Ischemic stroke (H); Hyperlipidemia LDL goal <70; Posterior circulation stroke (H); Cerebral venous sinus thrombosis; Intracranial atherosclerosis      atorvastatin (LIPITOR) 80 MG tablet Take 1 tablet (80 mg) by mouth every evening.  Qty: 90 tablet, Refills: 0    Associated Diagnoses: Hyperlipidemia LDL goal <70      losartan (COZAAR) 25 MG tablet Take 1 tablet (25 mg) by mouth daily.  Qty: 30 tablet, Refills: 0    Associated Diagnoses: Uncontrolled type 2 diabetes mellitus with hyperglycemia (H); Ischemic stroke (H); Hyperlipidemia LDL goal <70; Posterior circulation stroke (H); Cerebral venous sinus thrombosis; Intracranial atherosclerosis      metFORMIN (GLUCOPHAGE XR) 500 MG 24 hr tablet Take 1 tablet (500 mg) by mouth daily (with dinner).  Qty: 30 tablet, Refills: 0    Associated Diagnoses: Uncontrolled type 2 diabetes mellitus with hyperglycemia (H); Ischemic stroke (H); Hyperlipidemia LDL goal <70; Posterior circulation stroke (H); Cerebral venous sinus thrombosis; Intracranial atherosclerosis      polyethylene glycol (MIRALAX) 17 GM/Dose powder Take 17 g by mouth daily.  Qty: 510 g, Refills: 0    Associated Diagnoses: Uncontrolled type 2 diabetes mellitus with hyperglycemia (H); Ischemic stroke (H); Hyperlipidemia LDL goal <70; Posterior circulation stroke (H); Cerebral venous sinus thrombosis; Intracranial atherosclerosis           STOP taking these medications       insulin glargine (LANTUS PEN) 100 UNIT/ML pen Comments:   Reason for Stopping:                 DISCHARGE INSTRUCTIONS AND  FOLLOW UP  Discharge Procedure Orders   ALCOHOL WIPES PER BOX     Adult Neurology  Referral   Standing Status: Future   Referral Priority: Routine: Next available opening Referral Type: Consultation   Number of Visits Requested: 1     Physical Therapy  Referral   Standing Status: Future   Referral Priority: Routine Referral Type: Rehab Therapy Physical Therapy   Number of Visits Requested: 1     Occupational Therapy  Referral   Standing Status: Future   Referral Priority: Routine Referral Type: Occupational Therapy   Number of Visits Requested: 1     Speech Therapy  Referral   Standing Status: Future   Referral Priority: Routine Referral Type: Therapeutic Services   Number of Visits Requested: 1     Reason for your hospital stay   Order Comments: You were admitted to acute rehabilitation after an ischemic stroke of your right ariel-shalom. You were additionally started on insulin as your hemoglobin A1c was found to be 12.8%.     Activity   Order Comments: Your activity upon discharge: Pt is now safe to discharge to home with family IADL assist. He has ongoing balance and endurance deficit below baseline, and will follow up at [facility] for ongoing OP PT follow-up.     Order Specific Question Answer Comments   Is discharge order? Yes      Follow Up ([facility])   Order Comments: Follow up with PCP for transition back home after acute rehabilitation stay. Follow up with stroke neurology, PM&R, and outpatient PT/OT/SLP at [facility]    Appointments on [place] and/or [facility] (with [facility] or [facility] provider or service). Call [phone] if you haven't heard regarding these appointments within 7 days of discharge.     Miscellaneous DME Supply Order (Use only if a more specific DME order does not already exist)     Order Specific Question Answer Comments   PATIENT INSTRUCTIONS: Please contact your preferred vendor or insurance provider for assistance in obtaining the ordered  medical equipment item.    Start Date: 10/2/2024    DME Item Needed: Sharps container    Length of Need: indefinitely    DME Item Quantity: 1      Diet   Order Comments: Follow this diet upon discharge: Current Diet:Orders Placed This Encounter      Room Service      Combination Diet Regular Diet; Regular Diet; Thin Liquids (level 0)     Order Specific Question Answer Comments   Is discharge order? Yes           PHYSICAL EXAMINATION    Most recent Vital Signs:   Vitals:    09/30/24 1543 10/01/24 0641 10/02/24 0800 10/02/24 1554   BP: 107/63 132/76 130/72 115/72   BP Location: Left arm Right arm Right arm Right arm   Patient Position:   Sitting    Pulse: 89 81 77 72   Resp: 16 17 16 16   Temp:  98.7  F (37.1  C) 98.4  F (36.9  C) 98  F (36.7  C)   TempSrc: Oral Oral Oral Oral   SpO2: 100% 97% 97% 98%   Weight:       Height:           General: in no acute distress, conversational and alert, sitting in bed post therapy   HEENT: atraumatic, nares clear, conjunctiva white  Pulmonary: on room air, symmetrical chest rise  Cardiovascular: RRR, well-perfused peripherally  Abdominal: soft, non-tender to palpation, non-distended  Extremities: warm peripherally, no edema in BLEs  Skin: warm, dry, without erythema, ecchymosis, or rash noted  MSK: no BLE edema noted, calves non-tender to palpation  Neuro: conjugate gaze, speech non-dysarthric, bilateral strength , elbow flexion, and elbow extension 5/5     Discharge summary was forwarded to No Ref-Primary, Physician (PCP) at the time of discharge, so as to bridge from hospital to outpatient care.     It was our pleasure to care for Francisco J Felipe during this hospitalization. Please do not hesitate to contact me should there be questions regarding the hospital course or discharge plan.        --   Hira Vasquez MD  [facility] PM&R PGY-2

## 2024-10-02 NOTE — PROGRESS NOTES
[facility]   Acute Rehabilitation Unit  Daily Progress Note    INTERVAL HISTORY  Notes and labs reviewed over past 24 hours. No acute overnight events reported.  Continues to do remarkably well with CVA recovery.  On target to discharge home tomorrow.  Updated to Mod I in room with wheeled walker.  Denies chest pain, shortness of breath, no fever or chills.     ROS: 10 point ROS was assessed and was negative unless otherwise stated in HPI    Functional  PT:  Current Status:  Bed Mobility: SBA   Transfer: SBA with WW  Gait: SBA with WW  Stairs: SBA with B rails,      Outcome Measures:   [place]on[place]- 9/29 = 34/56  FGA- 9/29 = 16/30     MEDICATIONS  Scheduled meds  Current Facility-Administered Medications   Medication Dose Route Frequency Provider Last Rate Last Admin    apixaban ANTICOAGULANT (ELIQUIS) tablet 5 mg  5 mg Oral BID Hira Vasquez MD   5 mg at 10/02/24 0802    atorvastatin (LIPITOR) tablet 80 mg  80 mg Oral QPM Hira Vasquez MD   80 mg at 10/01/24 2107    insulin aspart (NovoLOG) injection (RAPID ACTING)  1-7 Units Subcutaneous TID AC Hira Vasquez MD   1 Units at 10/01/24 1141    insulin aspart (NovoLOG) injection (RAPID ACTING)  1-5 Units Subcutaneous At Bedtime Hira Vasquez MD        insulin glargine (LANTUS PEN) injection 15 Units  15 Units Subcutaneous QAM AC Hira Vasquez MD   15 Units at 10/02/24 0802    losartan (COZAAR) tablet 25 mg  25 mg Oral Daily Hira Vasquez MD   25 mg at 10/02/24 0802    metFORMIN (GLUCOPHAGE XR) 24 hr tablet 500 mg  500 mg Oral Daily with supper Hira Vasquez MD   500 mg at 10/01/24 1729       PRN meds:  Current Facility-Administered Medications   Medication Dose Route Frequency Provider Last Rate Last Admin    acetaminophen (TYLENOL) tablet 650 mg  650 mg Oral Q4H PRN Hira Vasquez MD        benzocaine-menthol (CHLORASEPTIC) 6-10 MG lozenge 1 lozenge  1 lozenge Buccal Q1H PRN Hira Vasquez MD        calcium  "carbonate (TUMS) chewable tablet 1,000 mg  1,000 mg Oral TID PRN Hira Vasquez MD        glucose gel 15-30 g  15-30 g Oral Q15 Min PRN Hira Vasquez MD        Or    dextrose 50 % injection 25-50 mL  25-50 mL Intravenous Q15 Min PRN Hira Vasquez MD        Or    glucagon injection 1 mg  1 mg Subcutaneous Q15 Min PRN Hira Vasquez MD        melatonin tablet 3 mg  3 mg Oral At Bedtime PRN Hira Vasquez MD        Patient is already receiving anticoagulation with heparin, enoxaparin (LOVENOX), warfarin (COUMADIN)  or other anticoagulant medication   Does not apply Continuous PRN Hira Vasquez MD        polyethylene glycol (MIRALAX) Packet 17 g  17 g Oral Daily PRN Hira Vasquez MD        senna-docusate (SENOKOT-S/PERICOLACE) 8.6-50 MG per tablet 2 tablet  2 tablet Oral BID PRN Hira Vasquez MD             PHYSICAL EXAM  /72 (BP Location: Right arm, Patient Position: Sitting)   Pulse 77   Temp 98.4  F (36.9  C) (Oral)   Resp 16   Ht 1.727 m (5' 8\")   Wt 73.3 kg (161 lb 8 oz)   SpO2 97%   BMI 24.56 kg/m    General: in no acute distress, conversational and alert, sitting in bed post therapy   HEENT: atraumatic, nares clear, conjunctiva white  Pulmonary: on room air, symmetrical chest rise  Cardiovascular: RRR, well-perfused peripherally  Abdominal: soft, non-tender to palpation, non-distended  Extremities: warm peripherally, no edema in BLEs  Skin: warm, dry, without erythema, ecchymosis, or rash noted  MSK: no BLE edema noted, calves non-tender to palpation  Neuro: conjugate gaze, speech non-dysarthric, bilateral strength , elbow flexion, and elbow extension 5/5      LABS  Results for orders placed or performed during the hospital encounter of 09/27/24 (from the past 24 hour(s))   Glucose by meter   Result Value Ref Range    GLUCOSE BY METER POCT 142 (H) 70 - 99 mg/dL   Glucose by meter   Result Value Ref Range    GLUCOSE BY METER POCT 146 (H) 70 - 99 mg/dL   Glucose by meter "   Result Value Ref Range    GLUCOSE BY METER POCT 176 (H) 70 - 99 mg/dL   Glucose by meter   Result Value Ref Range    GLUCOSE BY METER POCT 156 (H) 70 - 99 mg/dL   Glucose by meter   Result Value Ref Range    GLUCOSE BY METER POCT 133 (H) 70 - 99 mg/dL       ASSESSMENT AND PLAN    Francisco J Felipe is a 69 year old right hand dominant male with past medical history of HTN, T2DM c/b diabetic foot ulcerations and left charcot foot, who initially presented to [facility] on 9/23 with a 3 day history of left sided facial droop, and unsteady gait. Stroke workup revealed a small acute infarct of the right hemipons. He was subsequently admitted to the  ARU on 9/27/2024 for ongoing rehabilitation. Deficits include impairments to mobility, ability to do ADLs, balance, coordination, cognition, processing, swallowing. Patient will require and benefit from PT, OT, and SLP services as well as all of the ancillary services offered in the inpatient rehab setting.       .     Admission to acute inpatient rehab: 9/27/2024   Impairment group code: Stroke Ischemic 01.1 (L) Body Involvement (R) Brain; Acute ischemic stroke of right pontine area due to small-vessel occlusion and right frontal area due to ESUS      PT, OT and SLP 60 minutes of each on a daily basis, 6x a week,  in addition to rehab nursing and close management of physiatrist.     Impairment of ADL's:  OT for 60 min daily, 6x a week to work on upper and lower body self care, dressing, toileting, bathing, energy conservation techniques with use of ADs as needed   Impairment of mobility:   PT for 60 min daily, 6x a week  to work on gait exercises, strengthening, endurance buildup, transfers with use of walker as needed   Impairment of cognition/language/swallow:   SLP for 60 min daily, 6x a week  for cognitive evaluation and treatment strategies for higher level cognitive deficits and memory impairment   Rehab RN to administer medication, patient education on medication  taking, VS monitoring, bowel regimen, glucose monitoring and wound care/surgical wound dressing changes and monitoring.     Medical Conditions  # Acute Ischemic Stroke of the Right Robb shalom, ESUS (9/20/24)  # Dysphagia  # Hypertension  # Hyperlipidemia  Onset of symptoms 9/20/2024 with gait instability and facial droop. Stroke workup revealed acute infarction of the right robb-shalom. TTE on 9/24 did not demonstrate PFO or identifiable source of embolism. A1c=12.8%, Cholesterol 219, , Triglycerides 79. (9/23). Telemetry during acute stay did not identify atrial fibrillation. On admission his strength is overall well maintained. Left-sided coordination is mildly impaired. Dysphagia was noted at OSH.   - PT/OT/SLP  - Continue Lipitor 80 mg p.o. daily. Goal < 70.  - Continue losartan 25mg QD. Will likely increase in following days with BP goal < 130/80 mmhg.   - DAPT: Loaded with plavix on 9/24. Continue plavix 75 mg QD x3 months. ASA 81mg QD indefinitely.   - Ziopatch sent to home. Should apply after discharge.   - Outpatient stroke neurology follow up in 6-8 weeks.      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 No headache, no seizure history. 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  -Seizure precautions     # Common Femoral DVT, Left Lower Extremity (9/24/24)  # Deep Femoral DVT, Left Lower Extremity (9/24/24)  # Popliteal DVT, Left Lower Extremity (9/24/24)  # Peroneal Vein DVT, Left Lower Extremity (9/24/24)  Patient was found to have left lower extremity DVT on 9/24/2024.  Started on low-dose heparin drip at OSH prior to transitioning to eliquis. Ongoing LLE edema.   - Continue apixaban 5mg BID     # Constipation  # Vasovagal syncope  Patient experienced an episode of vasovagal syncope while having a bowel movement at OSH on  9/25. Symptoms included  bradycardia (HR=27 bpm), nausea, and vomiting.  and diaphoresis. Twelve-lead EKG was done and showed normal sinus rhythm with no acute changes. He reports that his last good bowel movement was 1 week PTA. Several small BM's during acute hospitalization.   - Continue miralax 17 g daily PRN  - Senna/docusate 2 tabs BID PRN  - Bisacodyl suppository QD PRN       # Type 2 Diabetes Mellitus, Uncontrolled   # Charcot Foot, Left  Prior to acute hospitalization, patient had been taking metformin 500mg roughly every 3 days. A1C found to be 12.8% on 9/23/24.  - Continue metformin  mg p.o. QD  - Continue Lantus 15 units subQ qAM.  - Medium Dose sliding scale insulin  - BG checks TID + AC  - Orthotics order placed for left diabetic shoe.   - Hypoglycemia protocol   - Diabetes educator consultation placed. Appreciate involvement in patient's care. In order to decrease cost burdon of his new diabetes regimen, on discharge the patient will need the following prescribed:  Glucocard Vital Meter kit - $0   Glucocard Vital strips #100 - $15   TechLITE lancets #100 - $10   Sharps Container   Alcohol Wipes   B-D 4 mm gregory pen needles   Insulin Glargine-YFGN pens - $187/box   Insulin Lispro pens - $200/box     # Charcot joint of left lower extremity:  CROW boot has been recommended by podiatry during ambulation.  Can be utilized if patient is able to have this brought from home  -Fall precautions     # Adjustment to disability:  Will hold on ordering rehab psychology at this time, will monitor mood over course of rehab stay     FEN: Soft, easy to chew. Thin Liquids  DVT Prophylaxis: Apixaban 5mg BID  GI Prophylaxis:  None  Code: Full, Confirmed on admission  Disposition: home with outpatient therapy   ELOS/Discharge Date:  10/3/24.  Rehab prognosis:  Good   Follow up Appointments on Discharge:   Outpatient PCP.   Outpatient PM&R.   Outpatient therapies.   Stroke Neurology (6-8 weeks)        Wilfred AGUILAR  DO Annelise      I spent a total of 25 minutes face to face and coordinating care of Francisco J Felipe. Over 50% of my time on the unit was spent counseling the patient and /or coordinating care regarding post CVA.

## 2024-10-02 NOTE — PLAN OF CARE
"Discharge Planner Post-Acute Rehab OT:     Discharge Plan: Home with family IADL support and OP therapy    Precautions: Fall, Seizure    Current Status:  ADLs:  Mobility: Mod I with FWW.  Grooming: Mod I standing.  Dressing: UB Mod I. LB CGA standing, Mod I sitting to thread Feet Mod I figure-four.  Bathing: SUP standing with Gbs, minimal use of ETB.  Toileting: Mod I with FWW  IADLs: IND at baseline in all but driving; family/GF can help at discharge   Vision/Cognition: M-ACE 24/30 (<21/30 impaired); vision appears intact per screening; wears readers    Assessment: Shower assessment completed, stood 95% of shower. Would require less assist/SUP if Pt used shower seat or sat to get dressed.    Other Barriers to Discharge (DME, Family Training, etc):   Caregiver support: Lives with siblings that can \"provide support as needed\". Recommend family call/update prior to discharge regarding recommendations.  Home set up: 3 VALERIE and all needs on main level.  Equipment: Shower chair, bathroom grab bars.  "

## 2024-10-02 NOTE — PROGRESS NOTES
URGENT NEEDS VOUCHER OBTAINED    Medication: NOVOLOG FLEXPEN 100 UNIT/ML SC SOPN  Sponsor: [employer]Manuel [employer]isk  Member ID: [id number]  BIN: [id number]  PCN: [id number]  Group: [id number]  Expected Copay: $ 0  Copay card Monthly Max Amount: $ 134.67  Copay card Annual Amount: $ 134.67  Comments: Un[place]. [place] Urgent Needs [place]cher at discharge pharmacy for one free fill. Can only use an Urgent Needs [place] once per year per insulin.            Edie Larson CPhT  Discharge Pharmacy Liaison  [facility] [place] Discharge Pharmacy  Pronouns: She/Her/Hers    Securely message with Jade Magnet, Epic Secure Chat, or Ziios  Phone: [phone]  Fax: [phone]  [email or web address]

## 2024-10-03 VITALS
WEIGHT: 161.5 LBS | TEMPERATURE: 98 F | RESPIRATION RATE: 16 BRPM | SYSTOLIC BLOOD PRESSURE: 151 MMHG | HEART RATE: 83 BPM | OXYGEN SATURATION: 99 % | BODY MASS INDEX: 24.48 KG/M2 | DIASTOLIC BLOOD PRESSURE: 84 MMHG | HEIGHT: 68 IN

## 2024-10-03 LAB
ANION GAP SERPL CALCULATED.3IONS-SCNC: 8 MMOL/L (ref 7–15)
BASOPHILS # BLD AUTO: 0 10E3/UL (ref 0–0.2)
BASOPHILS NFR BLD AUTO: 1 %
BUN SERPL-MCNC: 20.6 MG/DL (ref 8–23)
CALCIUM SERPL-MCNC: 9.1 MG/DL (ref 8.8–10.4)
CHLORIDE SERPL-SCNC: 107 MMOL/L (ref 98–107)
CREAT SERPL-MCNC: 0.92 MG/DL (ref 0.67–1.17)
EGFRCR SERPLBLD CKD-EPI 2021: >90 ML/MIN/1.73M2
EOSINOPHIL # BLD AUTO: 0.4 10E3/UL (ref 0–0.7)
EOSINOPHIL NFR BLD AUTO: 5 %
ERYTHROCYTE [DISTWIDTH] IN BLOOD BY AUTOMATED COUNT: 11.6 % (ref 10–15)
GLUCOSE BLDC GLUCOMTR-MCNC: 113 MG/DL (ref 70–99)
GLUCOSE BLDC GLUCOMTR-MCNC: 85 MG/DL (ref 70–99)
GLUCOSE SERPL-MCNC: 115 MG/DL (ref 70–99)
HCO3 SERPL-SCNC: 22 MMOL/L (ref 22–29)
HCT VFR BLD AUTO: 34.5 % (ref 40–53)
HGB BLD-MCNC: 11.6 G/DL (ref 13.3–17.7)
IMM GRANULOCYTES # BLD: 0 10E3/UL
IMM GRANULOCYTES NFR BLD: 0 %
LYMPHOCYTES # BLD AUTO: 2.5 10E3/UL (ref 0.8–5.3)
LYMPHOCYTES NFR BLD AUTO: 34 %
MCH RBC QN AUTO: 31.1 PG (ref 26.5–33)
MCHC RBC AUTO-ENTMCNC: 33.6 G/DL (ref 31.5–36.5)
MCV RBC AUTO: 93 FL (ref 78–100)
MONOCYTES # BLD AUTO: 0.5 10E3/UL (ref 0–1.3)
MONOCYTES NFR BLD AUTO: 7 %
NEUTROPHILS # BLD AUTO: 3.9 10E3/UL (ref 1.6–8.3)
NEUTROPHILS NFR BLD AUTO: 53 %
NRBC # BLD AUTO: 0 10E3/UL
NRBC BLD AUTO-RTO: 0 /100
PLATELET # BLD AUTO: 218 10E3/UL (ref 150–450)
POTASSIUM SERPL-SCNC: 3.6 MMOL/L (ref 3.4–5.3)
RBC # BLD AUTO: 3.73 10E6/UL (ref 4.4–5.9)
SODIUM SERPL-SCNC: 137 MMOL/L (ref 135–145)
WBC # BLD AUTO: 7.4 10E3/UL (ref 4–11)

## 2024-10-03 PROCEDURE — 36415 COLL VENOUS BLD VENIPUNCTURE: CPT

## 2024-10-03 PROCEDURE — 85025 COMPLETE CBC W/AUTO DIFF WBC: CPT

## 2024-10-03 PROCEDURE — 80048 BASIC METABOLIC PNL TOTAL CA: CPT

## 2024-10-03 PROCEDURE — 250N000013 HC RX MED GY IP 250 OP 250 PS 637

## 2024-10-03 RX ADMIN — APIXABAN 5 MG: 5 TABLET, FILM COATED ORAL at 07:47

## 2024-10-03 RX ADMIN — LOSARTAN POTASSIUM 25 MG: 25 TABLET, FILM COATED ORAL at 07:47

## 2024-10-03 RX ADMIN — INSULIN GLARGINE 15 UNITS: 100 INJECTION, SOLUTION SUBCUTANEOUS at 07:39

## 2024-10-03 ASSESSMENT — ACTIVITIES OF DAILY LIVING (ADL)
ADLS_ACUITY_SCORE: 26

## 2024-10-03 NOTE — PROGRESS NOTES
Discharge Plan     Discharge Date: 10/03/2024  Discharge Disposition: Home        Discharge Services:  OP THERAPY     ORDERS AND DISCHARGE SUMMARY WILL NEED TO BE FAXED TO      Discharge Supplies: All DME supplied by PT/OT       Discharge Transportation: 11:00am-12:00PM ; family will provide.      Elizabeth Desai [facility], Acute Inpatient Rehab Unit   [address] [place] [address], [address] [place] [address]  Phone: [phone]  Fax: [phone]

## 2024-10-03 NOTE — PLAN OF CARE
Goal Outcome Evaluation:      Plan of Care Reviewed With: patient    Overall Patient Progress: improving    Outcome Evaluation: Pt discharging to home today with significant other.    FOCUS/GOAL  Discharge planning    ASSESSMENT, INTERVENTIONS AND CONTINUING PLAN FOR GOAL:  Writer went through all discharge instructions with pt and significant other.  Pt and significant other went through diabetes education before leaving with educator.  Sent with all personal items and medications.  Wheeled down to family vehicle supervised in and left at 1145.

## 2024-10-03 NOTE — PLAN OF CARE
Goal Outcome Evaluation:    Overall Patient Progress: no change    Outcome Evaluation: No change in Pt progress this shift.    Pt is alert and oriented. Continent of B&B. LBM 10/1. Mod I with walker. Denied pain, SOB, CP, and n/t. BG was 85 - offered snacks to maintain BG but Pt refused. Call light within reach. Pt appeared asleep during safety checks. Planning for discharge 10/3. Will continue with POC.

## 2024-10-03 NOTE — CONSULTS
"Diabetes CNS/Educator Consult    Francisco J Felipe is a 69 year old male with past medical history of HTN, T2DM c/b diabetic foot ulcerations and left charcot foot, who initially presented to [facility] on 9/23 with a 3 day history of left sided facial droop, and unsteady gait. Stroke workup revealed a small acute infarct of the right hemipons. He was subsequently admitted to the  ARU on 9/27/2024 for ongoing rehabilitation.     Diabetes Educator consulted for new insulin at discharge. Francisco J has T2DM managed with PTA metformin. A1c was 12.8% on 9/23/24.     Met with Francisco J and partner (Radha) at bedside for education. Francisco J will be managing diabetes cares at home mostly independently, but will have support from Radha. Radha explained that Francisco J was checking his BGs and managing his diabetes in the past, but stopped doing that and started having more sugar food/drink options in his diet. Francisco J has also taken insulin in the past, but he says it was discontinued by a provider. Francisco J and Radha said they are both motivated to take better care of his diabetes after this stroke.    Diet: Room Service  Combination Diet Regular Diet; Regular Diet; Thin Liquids (level 0)  Diet       Barriers to diabetes management: uninsured    Diabetes Education Provided:  Discussed BG goals and A1c. Discussed risks with uncontrolled diabetes. Provided \"Understanding Diabetes\" handout.   Reviewed insulin action, dose, onset, and duration. Francisco J has taken insulin in the past and remembers how to use the pens. Declined further review. Provided \"4 mm pen needle - tips for good injection practice\" handout.   Discussed frequency and dosing of insulin administration.  Frequency of testing discussed. They declined review of meter as they have used it in the past.  Francisco J described proper hypoglycemia recognition and treatment.  Reviewed calling provider for consistently elevated BGs and/or any lows.   Discussed following up with PCP on 10/11/24.    Diabetes Plan " Reviewed with Patient:  Supplies:  Accu Chek Guide glucose meter - $0 with pharmacy voucher  Accu Chek Guide test strips  Accu Chek Soft Clix lancets  Sharps container  Alcohol wipes  B-D 4 mm gregory pen needles  Insulin glargine-YFGN (Semglee) pens - $0 *  Insulin aspart (Novolog) flexpens - $0 *  Metformin (Glucophage XR)    *[place] Urgent Needs voucher at discharge pharmacy for one free fill. Can only use an Urgent Needs [place] voucher once per year per insulin.     Blood glucose monitoring:   Three times daily before meals, and at bedtime.  Blood glucose (BG) goal:      Glucose Control Regimen:  Metformin (Glucophage XR) Take 500 mg daily with dinner.    Insulin glargine (Semglee): take 15 units once daily at 8:00 AM. Take at same time each day.     Insulin aspart (Novolog): take correction dose (see scales below) if BG is 140 or higher before meals, and if  or higher at bedtime. You may take this dose even if you skip a meal. Do not take this correction dose more frequently than every 4 hours.   Pre-Meal Correction Scale   Do Not give Correction Insulin if Pre-Meal BG less than 140.   For Pre-Meal  - 189 give 1 unit.   For Pre-Meal  - 239 give 2 units.   For Pre-Meal  - 289 give 3 units.   For Pre-Meal  - 339 give 4 units.   For Pre-Meal  - 389 give 5 units.   For Pre-Meal  - 439 give 6 units   For Pre-Meal BG greater than or equal to 440 give 7 units.     Bedtime Correction Scale   Do Not give Correction Insulin if BG less than 200.   For  - 249 give 1 unit.   For  - 299 give 2 units.   For  - 349 give 3 units.   For  - 399 give 4 units.   For BG greater than or equal to 400 give 5 units.     Hypoglycemia (Low Blood Glucose) Management:  If blood glucose is 51 to 70:   Eat or drink 15 grams of carbohydrate. Examples:   1/2 cup (4 ounces) apple juice or regular soda pop, or   1 cup (8 ounces) milk, or   15  skittles, or   3 to 4 glucose tablets.   Re-check your blood glucose in 15 minutes.   Repeat these steps every 15 minutes until your blood glucose is above 80.       If blood glucose is 50 or less:   Eat or drink 30 grams of carbohydrate. Examples:   1 cup (8 ounces) apple juice or regular soda pop, or   2 cups (16 ounces) milk, or   1 banana, or   6 to 8 glucose tablets.   Re-check your blood glucose in 15 minutes.   Repeat these steps every 15 minutes until your blood glucose is above 80.     Outpatient Follow-Up:   Follow-up with your Primary Care Provider on 10/11/24 at 8:30 AM. Follow up sooner if your BG is consistently greater than 180 mg/dL or if you have any BGs below 70 mg/dL.     Your target A1c value is less than 7%. Your most recent A1c is 12.8%.     Diabetes education completed. Francisco J and partner were engaged and explained that much of this is not new to them as Francisco J has had diabetes for a while and partner has experience with caring for family members with diabetes. Both stated that plan is manageable. All questions answered. Discussed with charge RN. Bedside RN aware.      PARESH Ragsdale, SUSANNE  Diabetes Educator  Pager: [phone]  Office: [phone]  Securely message with [employer]

## 2024-10-11 ENCOUNTER — ORDERS ONLY (AUTO-RELEASED) (OUTPATIENT)
Dept: FAMILY MEDICINE | Facility: CLINIC | Age: 69
End: 2024-10-11

## 2024-10-11 ENCOUNTER — OFFICE VISIT (OUTPATIENT)
Dept: FAMILY MEDICINE | Facility: CLINIC | Age: 69
End: 2024-10-11

## 2024-10-11 VITALS
BODY MASS INDEX: 26.1 KG/M2 | SYSTOLIC BLOOD PRESSURE: 110 MMHG | RESPIRATION RATE: 16 BRPM | TEMPERATURE: 97.7 F | HEART RATE: 83 BPM | OXYGEN SATURATION: 95 % | DIASTOLIC BLOOD PRESSURE: 65 MMHG | HEIGHT: 68 IN | WEIGHT: 172.2 LBS

## 2024-10-11 DIAGNOSIS — E11.65 UNCONTROLLED TYPE 2 DIABETES MELLITUS WITH HYPERGLYCEMIA (H): ICD-10-CM

## 2024-10-11 DIAGNOSIS — I63.50 POSTERIOR CIRCULATION STROKE (H): ICD-10-CM

## 2024-10-11 DIAGNOSIS — Z12.11 SCREEN FOR COLON CANCER: ICD-10-CM

## 2024-10-11 DIAGNOSIS — E78.5 HYPERLIPIDEMIA LDL GOAL <70: ICD-10-CM

## 2024-10-11 DIAGNOSIS — G08 CEREBRAL VENOUS SINUS THROMBOSIS: ICD-10-CM

## 2024-10-11 DIAGNOSIS — L97.512 TOE ULCER, RIGHT, WITH FAT LAYER EXPOSED (H): ICD-10-CM

## 2024-10-11 DIAGNOSIS — I63.9 ISCHEMIC STROKE (H): ICD-10-CM

## 2024-10-11 DIAGNOSIS — I67.2 INTRACRANIAL ATHEROSCLEROSIS: ICD-10-CM

## 2024-10-11 DIAGNOSIS — Z11.59 NEED FOR HEPATITIS C SCREENING TEST: ICD-10-CM

## 2024-10-11 DIAGNOSIS — Z09 HOSPITAL DISCHARGE FOLLOW-UP: Primary | ICD-10-CM

## 2024-10-11 DIAGNOSIS — E11.65 POORLY CONTROLLED TYPE 2 DIABETES MELLITUS WITH NEUROPATHY (H): ICD-10-CM

## 2024-10-11 DIAGNOSIS — E11.40 POORLY CONTROLLED TYPE 2 DIABETES MELLITUS WITH NEUROPATHY (H): ICD-10-CM

## 2024-10-11 DIAGNOSIS — I82.4Y9 DEEP VEIN THROMBOSIS (DVT) OF PROXIMAL LOWER EXTREMITY, UNSPECIFIED CHRONICITY, UNSPECIFIED LATERALITY (H): ICD-10-CM

## 2024-10-11 LAB
ALBUMIN SERPL BCG-MCNC: 4.4 G/DL (ref 3.5–5.2)
ALP SERPL-CCNC: 83 U/L (ref 40–150)
ALT SERPL W P-5'-P-CCNC: 19 U/L (ref 0–70)
ANION GAP SERPL CALCULATED.3IONS-SCNC: 12 MMOL/L (ref 7–15)
AST SERPL W P-5'-P-CCNC: 23 U/L (ref 0–45)
BASOPHILS # BLD AUTO: 0 10E3/UL (ref 0–0.2)
BASOPHILS NFR BLD AUTO: 0 %
BILIRUB SERPL-MCNC: 0.4 MG/DL
BUN SERPL-MCNC: 19 MG/DL (ref 8–23)
CALCIUM SERPL-MCNC: 9.4 MG/DL (ref 8.8–10.4)
CHLORIDE SERPL-SCNC: 105 MMOL/L (ref 98–107)
CREAT SERPL-MCNC: 0.91 MG/DL (ref 0.67–1.17)
CREAT UR-MCNC: 141 MG/DL
EGFRCR SERPLBLD CKD-EPI 2021: >90 ML/MIN/1.73M2
EOSINOPHIL # BLD AUTO: 0.3 10E3/UL (ref 0–0.7)
EOSINOPHIL NFR BLD AUTO: 4 %
ERYTHROCYTE [DISTWIDTH] IN BLOOD BY AUTOMATED COUNT: 11.5 % (ref 10–15)
GLUCOSE SERPL-MCNC: 161 MG/DL (ref 70–99)
HCO3 SERPL-SCNC: 22 MMOL/L (ref 22–29)
HCT VFR BLD AUTO: 44.8 % (ref 40–53)
HGB BLD-MCNC: 14.9 G/DL (ref 13.3–17.7)
IMM GRANULOCYTES # BLD: 0 10E3/UL
IMM GRANULOCYTES NFR BLD: 0 %
LYMPHOCYTES # BLD AUTO: 1.6 10E3/UL (ref 0.8–5.3)
LYMPHOCYTES NFR BLD AUTO: 24 %
MCH RBC QN AUTO: 30.4 PG (ref 26.5–33)
MCHC RBC AUTO-ENTMCNC: 33.3 G/DL (ref 31.5–36.5)
MCV RBC AUTO: 91 FL (ref 78–100)
MICROALBUMIN UR-MCNC: 258 MG/L
MICROALBUMIN/CREAT UR: 182.98 MG/G CR (ref 0–17)
MONOCYTES # BLD AUTO: 0.4 10E3/UL (ref 0–1.3)
MONOCYTES NFR BLD AUTO: 6 %
NEUTROPHILS # BLD AUTO: 4.6 10E3/UL (ref 1.6–8.3)
NEUTROPHILS NFR BLD AUTO: 66 %
PLATELET # BLD AUTO: 175 10E3/UL (ref 150–450)
POTASSIUM SERPL-SCNC: 4.3 MMOL/L (ref 3.4–5.3)
PROT SERPL-MCNC: 7.4 G/DL (ref 6.4–8.3)
RBC # BLD AUTO: 4.9 10E6/UL (ref 4.4–5.9)
SODIUM SERPL-SCNC: 139 MMOL/L (ref 135–145)
WBC # BLD AUTO: 6.9 10E3/UL (ref 4–11)

## 2024-10-11 PROCEDURE — 82043 UR ALBUMIN QUANTITATIVE: CPT | Performed by: PHYSICIAN ASSISTANT

## 2024-10-11 PROCEDURE — 80053 COMPREHEN METABOLIC PANEL: CPT | Performed by: PHYSICIAN ASSISTANT

## 2024-10-11 PROCEDURE — 99207 PR FOOT EXAM NO CHARGE: CPT | Performed by: PHYSICIAN ASSISTANT

## 2024-10-11 PROCEDURE — 85025 COMPLETE CBC W/AUTO DIFF WBC: CPT | Performed by: PHYSICIAN ASSISTANT

## 2024-10-11 PROCEDURE — 36415 COLL VENOUS BLD VENIPUNCTURE: CPT | Performed by: PHYSICIAN ASSISTANT

## 2024-10-11 PROCEDURE — 82570 ASSAY OF URINE CREATININE: CPT | Performed by: PHYSICIAN ASSISTANT

## 2024-10-11 PROCEDURE — 99204 OFFICE O/P NEW MOD 45 MIN: CPT | Performed by: PHYSICIAN ASSISTANT

## 2024-10-11 RX ORDER — METFORMIN HYDROCHLORIDE 500 MG/1
500 TABLET, EXTENDED RELEASE ORAL
Qty: 90 TABLET | Refills: 1 | Status: SHIPPED | OUTPATIENT
Start: 2024-10-11 | End: 2024-11-06

## 2024-10-11 RX ORDER — LOSARTAN POTASSIUM 25 MG/1
25 TABLET ORAL DAILY
Qty: 90 TABLET | Refills: 1 | Status: SHIPPED | OUTPATIENT
Start: 2024-10-11 | End: 2024-11-06

## 2024-10-11 RX ORDER — INSULIN GLARGINE-YFGN 100 [IU]/ML
15 INJECTION, SOLUTION SUBCUTANEOUS EVERY MORNING
Qty: 15 ML | Refills: 0 | Status: SHIPPED | OUTPATIENT
Start: 2024-10-11

## 2024-10-11 RX ORDER — ATORVASTATIN CALCIUM 80 MG/1
80 TABLET, FILM COATED ORAL EVERY EVENING
Qty: 90 TABLET | Refills: 0 | Status: SHIPPED | OUTPATIENT
Start: 2024-10-11 | End: 2024-11-06

## 2024-10-11 ASSESSMENT — PAIN SCALES - GENERAL: PAINLEVEL: NO PAIN (0)

## 2024-10-11 NOTE — PROGRESS NOTES
Assessment & Plan     Hospital discharge follow-up  Recovering well post hospital and rehab discharge.  Ordered at home PT/speech/OT as well as nursing to continue care to continue to assess needs.  Has good support from family and friends.  Plan for follow-up in 2 months.  Sooner as needed.  See below for further workup.  - CBC with platelets and differential  - Comprehensive metabolic panel (BMP + Alb, Alk Phos, ALT, AST, Total. Bili, TP)  - Albumin Random Urine Quantitative with Creat Ratio  - FOOT EXAM  - Home Care Referral    Ischemic stroke (H)  Cerebral venous sinus thrombosis  Posterior circulation stroke (H)  Upcoming neurology follow-up.  Continue Eliquis.  Blood pressure well-controlled.  - Home Care Referral  - apixaban ANTICOAGULANT (ELIQUIS) 5 MG tablet  Dispense: 60 tablet; Refill: 0  - Insulin Glargine-yfgn 100 UNIT/ML SOPN  Dispense: 15 mL; Refill: 0  - losartan (COZAAR) 25 MG tablet  Dispense: 90 tablet; Refill: 1  - metFORMIN (GLUCOPHAGE XR) 500 MG 24 hr tablet  Dispense: 90 tablet; Refill: 1    Deep vein thrombosis (DVT) of proximal lower extremity, unspecified chronicity, unspecified laterality (H)  Continue Eliquis until December.  Will decide at that time whether we need to continue this or not.    Poorly controlled type 2 diabetes mellitus with neuropathy (H)  Toe ulcer, right, with fat layer exposed (H)  Uncontrolled type 2 diabetes mellitus with hyperglycemia (H)  Continue healthy diet and exercise.  Reviewed diabetic regimen.  Plan for follow-up in 2 months to reassess A1c can continue to titrate metformin.  May benefit from additional oral therapy.    - Albumin Random Urine Quantitative with Creat Ratio  - FOOT EXAM  - Adult Eye [facility] Referral  - Albumin Random Urine Quantitative with Creat Ratio    Screen for colon cancer  Reviewed need for colon cancer screening.  Amenable to Cologuard.  - COLOGUARD([employer])    Need for hepatitis C screening test  Hep C screening  "ordered    Hyperlipidemia LDL goal <70  Continue statin  - atorvastatin (LIPITOR) 80 MG tablet  Dispense: 90 tablet; Refill: 0      MED REC REQUIRED  Post Medication Reconciliation Status: discharge medications reconciled, continue medications without change  BMI  Estimated body mass index is 26.18 kg/m  as calculated from the following:    Height as of this encounter: 1.727 m (5' 8\").    Weight as of this encounter: 78.1 kg (172 lb 3.2 oz).   Weight management plan: Discussed healthy diet and exercise guidelines        Cynthia Marx is a 69 year old, presenting for the following health issues:  Hospital F/U    Here today for hospital discharge follow-up accompanied by Radha who he is currently staying with.     -Admitted 9/23/2024 until 9/27/2024 for acute ischemic stroke, DVT, uncontrolled diabetes.  Spent time in rehab.  Now back home.  No current services being provided.  Believes he would benefit from further PT/OT/speech.  Requesting refills on his medications.    -Seemingly blood sugars have improved since returning home.  Taking insulin as well as metformin without issue.  Last A1c 12.8%.  Prior to arrival to the emergency department was taking 500 mg of metformin roughly every 3 days.    -Continues on Eliquis until 12/24/2024 for DVT.  Will discuss further need for anticoagulation at that time.    -Ongoing issues with constipation.  Did suffer a vasovagal episode while in the hospital.    -Has never had colon cancer screening.  No family history.    Hospital Follow-up Visit:    Hospital/Nursing Home/IP Rehab Facility: [facility]  Date of Admission: 9/27/24  Date of Discharge: 10/3/24  Reason(s) for Admission: # Acute Ischemic Stroke of the Right Robb shalom, ESUS (9/20/24)  # Dysphagia  # Hypertension  # Hyperlipidemia  # Left transverse sigmoid and sigmoid sinus occlusion:   # Common Femoral DVT, Left Lower Extremity (9/24/24)  # Deep Femoral DVT, Left Lower " "Extremity (9/24/24)  # Popliteal DVT, Left Lower Extremity (9/24/24)  # Peroneal Vein DVT, Left Lower Extremity (9/24/24)  # Constipation  # Vasovagal syncope  # Type 2 Diabetes Mellitus, Uncontrolled   # Charcot Foot, Left    Problems taking medications regularly:  None  Medication changes since discharge: None  Problems adhering to non-medication therapy:  None    Summary of hospitalization:  [facility] discharge summary reviewed  Diagnostic Tests/Treatments reviewed.  Follow up needed: none  Other Healthcare Providers Involved in Patient s Care:         None  Update since discharge: improved.         Plan of care communicated with patient and family                 Review of Systems  Constitutional, neuro, ENT, endocrine, pulmonary, cardiac, gastrointestinal, genitourinary, musculoskeletal, integument and psychiatric systems are negative, except as otherwise noted.      Objective    /65   Pulse 83   Temp 97.7  F (36.5  C) (Temporal)   Resp 16   Ht 1.727 m (5' 8\")   Wt 78.1 kg (172 lb 3.2 oz)   SpO2 95%   BMI 26.18 kg/m    Body mass index is 26.18 kg/m .  Physical Exam   GENERAL: alert and no distress  EYES: Eyes grossly normal to inspection, PERRL and conjunctivae and sclerae normal  NECK: no adenopathy, no asymmetry, masses, or scars  RESP: lungs clear to auscultation - no rales, rhonchi or wheezes  CV: regular rate and rhythm, normal S1 S2, no S3 or S4, no murmur, click or rub, no peripheral edema  ABDOMEN: soft, nontender  MS: no gross musculoskeletal defects noted, no edema  SKIN: no suspicious lesions or rashes  NEURO: Normal strength and tone, mentation intact and speech normal  PSYCH: mentation appears normal, affect normal/bright      The likelihood of other entities in the differential is insufficient to justify any further testing for them at this time. This was explained to the patient. The patient was advised that persistent or worsening symptoms would require further " evaluation. Patient advised to call the office and if unable to reach to go to the emergency room if they develop any new or worsening symptoms. Expressed understanding and agreement with above stated plan.       Signed Electronically by: Wilfred Rodriguez PA-C

## 2024-10-11 NOTE — PATIENT INSTRUCTIONS
-Continue Eliquis until December  -Refilled medication  -Home care for PT/OT/Speech/nursing.  -Eye doctor for diabetic eye exam   -Metamucil for bowels, could consider adding Miralax if needed.

## 2024-10-11 NOTE — LETTER
October 14, 2024      Francisco J Felipe  [address] [facility] [address]        Dear ,    Improving blood counts. No signs of anemia.     -Your comprehensive metabolic panel which includes tests of liver function (ALT, AST, total bilirubin, alkaline phosphatase), kidney function (creatinine, BUN), electrolytes (sodium, potassium, calcium), and blood sugar (glucose) returned stable for you.     Let me know if you have any questions or concerns,       Resulted Orders   Comprehensive metabolic panel (BMP + Alb, Alk Phos, ALT, AST, Total. Bili, TP)   Result Value Ref Range    Sodium 139 135 - 145 mmol/L    Potassium 4.3 3.4 - 5.3 mmol/L    Carbon Dioxide (CO2) 22 22 - 29 mmol/L    Anion Gap 12 7 - 15 mmol/L    Urea Nitrogen 19.0 8.0 - 23.0 mg/dL    Creatinine 0.91 0.67 - 1.17 mg/dL    GFR Estimate >90 >60 mL/min/1.73m2      Comment:      eGFR calculated using 2021 CKD-EPI equation.    Calcium 9.4 8.8 - 10.4 mg/dL      Comment:      Reference intervals for this test were updated on 7/16/2024 to reflect our healthy population more accurately. There may be differences in the flagging of prior results with similar values performed with this method. Those prior results can be interpreted in the context of the updated reference intervals.    Chloride 105 98 - 107 mmol/L    Glucose 161 (H) 70 - 99 mg/dL    Alkaline Phosphatase 83 40 - 150 U/L    AST 23 0 - 45 U/L    ALT 19 0 - 70 U/L    Protein Total 7.4 6.4 - 8.3 g/dL    Albumin 4.4 3.5 - 5.2 g/dL    Bilirubin Total 0.4 <=1.2 mg/dL   Albumin Random Urine Quantitative with Creat Ratio   Result Value Ref Range    Creatinine Urine mg/dL 141.0 mg/dL      Comment:      The reference ranges have not been established in urine creatinine. The results should be integrated into the clinical context for interpretation.    Albumin Urine mg/L 258.0 mg/L      Comment:      The reference ranges have not been established in urine albumin. The results should be integrated into the  clinical context for interpretation.    Albumin Urine mg/g Cr 182.98 (H) 0.00 - 17.00 mg/g Cr      Comment:      Microalbuminuria is defined as an albumin:creatinine ratio of 17 to 299 for males and 25 to 299 for females. A ratio of albumin:creatinine of 300 or higher is indicative of overt proteinuria.  Due to biologic variability, positive results should be confirmed by a second, first-morning random or 24-hour timed urine specimen. If there is discrepancy, a third specimen is recommended. When 2 out of 3 results are in the microalbuminuria range, this is evidence for incipient nephropathy and warrants increased efforts at glucose control, blood pressure control, and institution of therapy with an angiotensin-converting-enzyme (ACE) inhibitor (if the patient can tolerate it).     CBC with platelets and differential   Result Value Ref Range    WBC Count 6.9 4.0 - 11.0 10e3/uL    RBC Count 4.90 4.40 - 5.90 10e6/uL    Hemoglobin 14.9 13.3 - 17.7 g/dL    Hematocrit 44.8 40.0 - 53.0 %    MCV 91 78 - 100 fL    MCH 30.4 26.5 - 33.0 pg    MCHC 33.3 31.5 - 36.5 g/dL    RDW 11.5 10.0 - 15.0 %    Platelet Count 175 150 - 450 10e3/uL    % Neutrophils 66 %    % Lymphocytes 24 %    % Monocytes 6 %    % Eosinophils 4 %    % Basophils 0 %    % Immature Granulocytes 0 %    Absolute Neutrophils 4.6 1.6 - 8.3 10e3/uL    Absolute Lymphocytes 1.6 0.8 - 5.3 10e3/uL    Absolute Monocytes 0.4 0.0 - 1.3 10e3/uL    Absolute Eosinophils 0.3 0.0 - 0.7 10e3/uL    Absolute Basophils 0.0 0.0 - 0.2 10e3/uL    Absolute Immature Granulocytes 0.0 <=0.4 10e3/uL       If you have any questions or concerns, please call the clinic at the number listed above.       Sincerely,      Wilfred Rodriguez PA-C/ NAYELI HANCOCK

## 2024-10-14 NOTE — RESULT ENCOUNTER NOTE
Lennox Marx,     -Improving blood counts. No signs of anemia.     -Your comprehensive metabolic panel which includes tests of liver function (ALT, AST, total bilirubin, alkaline phosphatase), kidney function (creatinine, BUN), electrolytes (sodium, potassium, calcium), and blood sugar (glucose) returned stable for you.    Let me know if you have any questions or concerns,     Wilfred Rodriguez PA-C  [facility]

## 2024-11-06 ENCOUNTER — OFFICE VISIT (OUTPATIENT)
Dept: FAMILY MEDICINE | Facility: CLINIC | Age: 69
End: 2024-11-06

## 2024-11-06 VITALS
BODY MASS INDEX: 26.49 KG/M2 | TEMPERATURE: 97.8 F | RESPIRATION RATE: 14 BRPM | SYSTOLIC BLOOD PRESSURE: 125 MMHG | DIASTOLIC BLOOD PRESSURE: 75 MMHG | WEIGHT: 174.8 LBS | HEIGHT: 68 IN | HEART RATE: 93 BPM | OXYGEN SATURATION: 98 %

## 2024-11-06 DIAGNOSIS — I63.50 POSTERIOR CIRCULATION STROKE (H): ICD-10-CM

## 2024-11-06 DIAGNOSIS — R60.0 EDEMA OF LEFT LOWER EXTREMITY: Primary | ICD-10-CM

## 2024-11-06 DIAGNOSIS — I63.9 ISCHEMIC STROKE (H): ICD-10-CM

## 2024-11-06 DIAGNOSIS — I67.2 INTRACRANIAL ATHEROSCLEROSIS: ICD-10-CM

## 2024-11-06 DIAGNOSIS — E11.65 UNCONTROLLED TYPE 2 DIABETES MELLITUS WITH HYPERGLYCEMIA (H): ICD-10-CM

## 2024-11-06 DIAGNOSIS — E78.5 HYPERLIPIDEMIA LDL GOAL <70: ICD-10-CM

## 2024-11-06 DIAGNOSIS — G08 CEREBRAL VENOUS SINUS THROMBOSIS: ICD-10-CM

## 2024-11-06 PROCEDURE — 99214 OFFICE O/P EST MOD 30 MIN: CPT | Performed by: PHYSICIAN ASSISTANT

## 2024-11-06 PROCEDURE — G2211 COMPLEX E/M VISIT ADD ON: HCPCS | Performed by: PHYSICIAN ASSISTANT

## 2024-11-06 RX ORDER — LOSARTAN POTASSIUM 25 MG/1
25 TABLET ORAL DAILY
Qty: 90 TABLET | Refills: 3 | Status: SHIPPED | OUTPATIENT
Start: 2024-11-06

## 2024-11-06 RX ORDER — METFORMIN HYDROCHLORIDE 500 MG/1
500 TABLET, EXTENDED RELEASE ORAL 2 TIMES DAILY WITH MEALS
Qty: 180 TABLET | Refills: 3 | Status: SHIPPED | OUTPATIENT
Start: 2024-11-06

## 2024-11-06 RX ORDER — ATORVASTATIN CALCIUM 80 MG/1
80 TABLET, FILM COATED ORAL EVERY EVENING
Qty: 90 TABLET | Refills: 3 | Status: SHIPPED | OUTPATIENT
Start: 2024-11-06

## 2024-11-06 ASSESSMENT — PAIN SCALES - GENERAL: PAINLEVEL_OUTOF10: NO PAIN (0)

## 2024-11-06 NOTE — PROGRESS NOTES
Assessment & Plan     Edema of left lower extremity  Doubt DVT given diligent taking of Eliquis.  No signs of overlying cellulitis/skin infection.  Suspect related to overuse and not wearing of his compression sock yesterday.  Has a history of left foot Charcot's which may be playing into this as well.  Notes improvement of swelling throughout the day today as he is wearing his compression sock.  Plan for anticoagulation discussion as well as potential repeat ultrasound at upcoming visit in December.  Follow-up sooner as needed.  He is comfortable this plan.    Ischemic stroke (H)  Cerebral venous sinus thrombosis  Hyperlipidemia LDL goal <70  Posterior circulation stroke (H)  Intracranial atherosclerosis  - apixaban ANTICOAGULANT (ELIQUIS) 5 MG tablet  Dispense: 60 tablet; Refill: 1  - atorvastatin (LIPITOR) 80 MG tablet  Dispense: 90 tablet; Refill: 3  - losartan (COZAAR) 25 MG tablet  Dispense: 90 tablet; Refill: 3    Uncontrolled type 2 diabetes mellitus with hyperglycemia (H)  Increase metformin to 500 mg twice daily.  Continue same insulin regimen.  Plan for follow-up A1c as well as blood sugar check at upcoming appointment.  Healthy diet and exercise.  - losartan (COZAAR) 25 MG tablet  Dispense: 90 tablet; Refill: 3  - metFORMIN (GLUCOPHAGE XR) 500 MG 24 hr tablet  Dispense: 180 tablet; Refill: 3      20 minutes spent by me on the date of the encounter doing chart review, review of test results, interpretation of tests, patient visit, and documentation     The longitudinal plan of care for the diagnosis(es)/condition(s) as documented were addressed during this visit. Due to the added complexity in care, I will continue to support Francisco J in the subsequent management and with ongoing continuity of care.    20  minutes spent by me on the date of the encounter on chart review, review of test results, interpretation of tests, patient visit, and documentation     Blood sugar testing frequency justification:   "Uncontrolled diabetes    No follow-ups on file.    The likelihood of other entities in the differential is insufficient to justify any further testing for them at this time. This was explained to the patient. The patient was advised that persistent or worsening symptoms would require further evaluation. Patient advised to call the office and if unable to reach to go to the emergency room if they develop any new or worsening symptoms. Expressed understanding and agreement with above stated plan.     REGINA [facility] ZHAO Felipe is a 69 year old male presenting for the following health issues:  Patient presents with:  Diabetes  Edema: Patient report swelling in lower left leg     Here today requesting medication refills.  Overall doing well since her last visit approximately 1 month ago which was his hospital discharge follow-up.    Of note, blood sugars are stable.  Currently taking 2 units of NovoLog with meals, 15 units of long-acting insulin, and metformin 500 mg once daily.  Blood sugar this morning 150.    Yesterday did not wear his compression sock and had a very active day outside with a lot of walking.  Note some left lower extremity swelling.  Denies pain.  No redness or warmth.  This was the leg that he had multiple blood clots and at recent hospitalization.  Denies shortness of breath, chest pain, heart racing.      Review of Systems   Constitutional, HEENT, cardiovascular, pulmonary, GI, , musculoskeletal, neuro, skin, endocrine and psych systems are negative, except as otherwise noted.      Objective    /75   Pulse 93   Temp 97.8  F (36.6  C) (Temporal)   Resp 14   Ht 1.727 m (5' 8\")   Wt 79.3 kg (174 lb 12.8 oz)   SpO2 98%   BMI 26.58 kg/m    5' 8\"  174 lbs 12.8 oz    Physical Exam   GENERAL: healthy, alert and no distress  EYES: Eyes grossly normal to inspection, PERRL and conjunctivae and sclerae normal  NECK: no adenopathy, no " asymmetry, masses, or scars and thyroid normal to palpation  RESP: lungs clear to auscultation - no rales, rhonchi or wheezes  CV: regular rate and rhythm, normal S1 S2, no S3 or S4, no murmur, click or rub  MS: no gross musculoskeletal defects noted.  Left lower extremity nonpitting edema.  No overlying erythema/warmth.  SKIN:no suspicious lesions or rashes  NEURO: Normal strength and tone, mentation intact and speech normal  PSYCH: mentation appears normal, affect normal/bright      Answers submitted by the patient for this visit:  Diabetes Visit (Submitted on 11/6/2024)  Chief Complaint: Chronic problems general questions HPI Form  Frequency of checking blood sugars:: four or more times daily  What time of day are you checking your blood sugars : before meals  Have you had any blood sugars above 200?: Yes  Have you had any blood sugars below 70?: No  Hypoglycemia symptoms:: shakiness  Diabetic concerns:: none  Paraesthesia present:: none of these symptoms  Have you had a diabetic eye exam within the last year?: No  General Questionnaire (Submitted on 11/6/2024)  Chief Complaint: Chronic problems general questions HPI Form  How many servings of fruits and vegetables do you eat daily?: 2-3  How many minutes a day do you exercise enough to make your heart beat faster?: 10 to 19  How many days a week do you exercise enough to make your heart beat faster?: 5  How many days per week do you miss taking your medication?: 1  Questionnaire about: Chronic problems general questions HPI Form (Submitted on 11/6/2024)  Chief Complaint: Chronic problems general questions HPI Form

## 2024-11-06 NOTE — PATIENT INSTRUCTIONS
-Refilled medications. Increase Metformin to 500 mg TWICE daily. Monitor blood sugars. Continue same insulin.   -Continue Eliquis - plan to re-ultrasound in Dec and decide if we need to stop medication. Leg elevation, compression socks.   -Cologuard.

## 2025-01-02 ENCOUNTER — TELEPHONE (OUTPATIENT)
Dept: NEUROLOGY | Facility: CLINIC | Age: 70
End: 2025-01-02

## 2025-01-02 NOTE — TELEPHONE ENCOUNTER
Patient called and offered to reschedule to several of the 30 minute openings the week of January 27th so that we can add new patients into the mix.

## 2025-01-06 DIAGNOSIS — I67.2 INTRACRANIAL ATHEROSCLEROSIS: ICD-10-CM

## 2025-01-06 DIAGNOSIS — I63.9 ISCHEMIC STROKE (H): ICD-10-CM

## 2025-01-06 DIAGNOSIS — G08 CEREBRAL VENOUS SINUS THROMBOSIS: ICD-10-CM

## 2025-01-06 DIAGNOSIS — I63.50 POSTERIOR CIRCULATION STROKE (H): ICD-10-CM

## 2025-01-06 DIAGNOSIS — E78.5 HYPERLIPIDEMIA LDL GOAL <70: ICD-10-CM

## 2025-01-06 DIAGNOSIS — E11.65 UNCONTROLLED TYPE 2 DIABETES MELLITUS WITH HYPERGLYCEMIA (H): ICD-10-CM

## 2025-01-06 RX ORDER — INSULIN GLARGINE-YFGN 100 [IU]/ML
15 INJECTION, SOLUTION SUBCUTANEOUS EVERY MORNING
Qty: 15 ML | Refills: 2 | Status: SHIPPED | OUTPATIENT
Start: 2025-01-06

## 2025-01-06 NOTE — TELEPHONE ENCOUNTER
Pt wants to , rx's today.     Future Appointments 1/6/2025 - 7/5/2025        Date Visit Type Length Department Provider     1/31/2025  8:30 AM RETURN STROKE 30 min CS NEUROLOGY Ben Ortega MD    Location Instructions:     [address], [address] [facility] [id number]              2/20/2025  9:30 AM OFFICE VISIT 30 min CS [facility]IM Wilfred Rodriguez PA-C    Location Instructions:     [facility] is in [address] of the [facility] at [address] This is just south of [facility] and the [facility] exit off of [address] Free parking is available; access the lot by turning east from [facility] onto [address] [place] [address]. Through the main entrance, the clinic is directly to the left.

## 2025-01-30 ENCOUNTER — TELEPHONE (OUTPATIENT)
Dept: NEUROLOGY | Facility: CLINIC | Age: 70
End: 2025-01-30

## 2025-01-30 NOTE — TELEPHONE ENCOUNTER
Attempted to reach patient to remind them about appointment scheduled with Ben Ortega MD on 1/31/25 in our [place] clinic.    A voicemail was left with a call back number if the patient has questions or would like to reschedule.

## 2025-02-26 ENCOUNTER — ORDERS ONLY (AUTO-RELEASED) (OUTPATIENT)
Dept: FAMILY MEDICINE | Facility: CLINIC | Age: 70
End: 2025-02-26

## 2025-02-26 ENCOUNTER — OFFICE VISIT (OUTPATIENT)
Dept: FAMILY MEDICINE | Facility: CLINIC | Age: 70
End: 2025-02-26

## 2025-02-26 VITALS
SYSTOLIC BLOOD PRESSURE: 130 MMHG | HEIGHT: 68 IN | BODY MASS INDEX: 26.37 KG/M2 | TEMPERATURE: 98 F | HEART RATE: 84 BPM | OXYGEN SATURATION: 100 % | WEIGHT: 174 LBS | DIASTOLIC BLOOD PRESSURE: 80 MMHG | RESPIRATION RATE: 16 BRPM

## 2025-02-26 DIAGNOSIS — Z12.11 SCREEN FOR COLON CANCER: ICD-10-CM

## 2025-02-26 DIAGNOSIS — E11.40 POORLY CONTROLLED TYPE 2 DIABETES MELLITUS WITH NEUROPATHY (H): ICD-10-CM

## 2025-02-26 DIAGNOSIS — E78.5 HYPERLIPIDEMIA LDL GOAL <70: ICD-10-CM

## 2025-02-26 DIAGNOSIS — M14.672 CHARCOT ANKLE, LEFT: ICD-10-CM

## 2025-02-26 DIAGNOSIS — I10 BENIGN ESSENTIAL HYPERTENSION: ICD-10-CM

## 2025-02-26 DIAGNOSIS — Z00.00 ENCOUNTER FOR ANNUAL WELLNESS EXAM IN MEDICARE PATIENT: Primary | ICD-10-CM

## 2025-02-26 DIAGNOSIS — G08 CEREBRAL VENOUS SINUS THROMBOSIS: ICD-10-CM

## 2025-02-26 DIAGNOSIS — E11.65 POORLY CONTROLLED TYPE 2 DIABETES MELLITUS WITH NEUROPATHY (H): ICD-10-CM

## 2025-02-26 DIAGNOSIS — R26.9 ABNORMAL GAIT: ICD-10-CM

## 2025-02-26 DIAGNOSIS — Z12.5 SCREENING PSA (PROSTATE SPECIFIC ANTIGEN): ICD-10-CM

## 2025-02-26 DIAGNOSIS — I63.9 ISCHEMIC STROKE (H): ICD-10-CM

## 2025-02-26 PROBLEM — L97.512 TOE ULCER, RIGHT, WITH FAT LAYER EXPOSED (H): Status: RESOLVED | Noted: 2024-10-11 | Resolved: 2025-02-26

## 2025-02-26 SDOH — HEALTH STABILITY: PHYSICAL HEALTH: ON AVERAGE, HOW MANY DAYS PER WEEK DO YOU ENGAGE IN MODERATE TO STRENUOUS EXERCISE (LIKE A BRISK WALK)?: 3 DAYS

## 2025-02-26 ASSESSMENT — SOCIAL DETERMINANTS OF HEALTH (SDOH): HOW OFTEN DO YOU GET TOGETHER WITH FRIENDS OR RELATIVES?: TWICE A WEEK

## 2025-02-26 NOTE — PATIENT INSTRUCTIONS
-Labs today  -Continue same meds for now.  -3 month follow-up  -Continue walking. Option for PT to help w/balance if needed.  -Eye exam  -Colon cancer screening: FIT test OR Cologuard  -Pneumonia shot + flu today

## 2025-02-26 NOTE — PROGRESS NOTES
Preventive Care Visit  [facility] ZHAO Rodriguez PA-C, Physician Assistant - Medical  Feb 26, 2025      Assessment & Plan     Encounter for annual wellness exam in Medicare patient  Annual labs ordered. Healthy diet and exercise reviewed. Recommended routine dental, eye, and skin screenings.  Prevnar 20+ flu shot today.  COVID/RSV shot in future.    - HEMOGLOBIN A1C  - CBC with platelets and differential  - Comprehensive metabolic panel (BMP + Alb, Alk Phos, ALT, AST, Total. 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  Adjustments in therapy based on results.  Annual eye exam  - HEMOGLOBIN A1C  - CBC with platelets and differential  - Comprehensive metabolic panel (BMP + Alb, Alk Phos, ALT, AST, Total. Bili, TP)  - Adult Eye  Referral    Ischemic stroke (H)  Cerebral venous sinus thrombosis  Abnormal gait  Due for follow-up with neurology.  Continue Eliquis.  Physical therapy referral signed for unsteady gait.  Walking with cane.  Balance/strength training    Hypertension  Recommended at home blood pressure monitoring.  130/80 today.  Additional readings elevated.  Continue losartan.    Hyperlipidemia  Continue atorvastatin.  Repeat lipids today.    Screening PSA (prostate specific antigen)  PSA screening ordered  - PSA, screen    Screen for colon cancer  Reviewed different options for colon cancer screening.  Amenable to stool test.  Cologuard sent.  - COLOGUARD(EXACT SCIENCES)    Patient has been advised of split billing requirements and indicates understanding: Yes    Counseling  Appropriate preventive services were addressed with this patient via screening, questionnaire, or discussion as appropriate for fall prevention, nutrition, physical activity, Tobacco-use cessation, social engagement, weight loss and cognition.  Checklist reviewing preventive services  available has been given to the patient.  Reviewed patient's diet, addressing concerns and/or questions.   He is at risk for lack of exercise and has been provided with information to increase physical activity for the benefit of his well-being.   The patient was instructed to see the dentist every 6 months.       Cynthia Marx is a pleasant 70 year old, presenting for the following:  Physical (AWV)    Here today for his annual wellness visit as well as routine follow-up.    History of type 2 diabetes on metformin 500 mg twice daily, NovoLog, as well as insulin glargine.  Notes fasting blood sugars in the morning in the 140s.  Denies hypoglycemia.  Continues on losartan as well as Atorvastatin.  Recovering well following CVA in September.    Follows with podiatry for history of Charcot's joint of left foot/ankle  Due for colon cancer screening.  Due for immunizations            Health Care Directive  Patient does not have a Health Care Directive:       2/26/2025   General Health   How would you rate your overall physical health? Good   Feel stress (tense, anxious, or unable to sleep) Not at all         2/26/2025   Nutrition   Diet: I don't know         2/26/2025   Exercise   Days per week of moderate/strenous exercise 3 days         2/26/2025   Social Factors   Frequency of gathering with friends or relatives Twice a week   Worry food won't last until get money to buy more No   Food not last or not have enough money for food? No   Do you have housing? (Housing is defined as stable permanent housing and does not include staying ouside in a car, in a tent, in an abandoned building, in an overnight shelter, or couch-surfing.) No   Are you worried about losing your housing? No   Lack of transportation? No   Unable to get utilities (heat,electricity)? No   Want help with housing or utility concern? No   (!) HOUSING CONCERN PRESENT      2/26/2025   Fall Risk   Fallen 2 or more times in the past year? No   Trouble with  "walking or balance? Yes   Gait Speed Test (Document in seconds) 7.24    7.24   Gait Speed Test Interpretation Greater than 5.01 seconds - ABNORMAL    Greater than 5.01 seconds - ABNORMAL       Multiple values from one day are sorted in reverse-chronological order          2/26/2025   Activities of Daily Living- Home Safety   Needs help with the following daily activites None of the above   Safety concerns in the home None of the above         2/26/2025   Dental   Dentist two times every year? (!) NO         2/26/2025   Hearing Screening   Hearing concerns? None of the above         2/26/2025   Driving Risk Screening   Patient/family members have concerns about driving No         2/26/2025   General Alertness/Fatigue Screening   Have you been more tired than usual lately? No         2/26/2025   Urinary Incontinence Screening   Bothered by leaking urine in past 6 months No            Today's PHQ-2 Score:       2/26/2025     2:39 PM   PHQ-2 ( 1999 Pf[employer])   Q1: Little interest or pleasure in doing things 0   Q2: Feeling down, depressed or hopeless 0   PHQ-2 Score 0    Q1: Little interest or pleasure in doing things Not at all   Q2: Feeling down, depressed or hopeless Not at all   PHQ-2 Score 0       Patient-reported           2/26/2025   Substance Use   Alcohol more than 3/day or more than 7/wk No   Do you have a current opioid prescription? No   How severe/bad is pain from 1 to 10? 0/10 (No Pain)   Do you use any other substances recreationally? No     Social History     Tobacco Use    Smoking status: Never    Smokeless tobacco: Never   Vaping Use    Vaping status: Never Used   Substance Use Topics    Alcohol use: No    Drug use: No           2/26/2025   AAA Screening   Family history of Abdominal Aortic Aneurysm (AAA)? No   Last PSA: No results found for: \"PSA\"  ASCVD Risk   The ASCVD Risk score (Kathy HASSAN, et al., 2019) failed to calculate for the following reasons:    Risk score cannot be calculated " because patient has a medical history suggesting prior/existing ASCVD          Reviewed and updated as needed this visit by Provider   Tobacco  Allergies  Meds   Med Hx  Surg Hx  Fam Hx            History reviewed. No pertinent past medical history.  History reviewed. No pertinent surgical history.  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 No pertinent surgical history.    Social History     Tobacco Use    Smoking status: Never    Smokeless tobacco: Never   Substance Use Topics    Alcohol use: No     Family History   Problem Relation Age of Onset    Cerebrovascular Disease Mother     Dementia Mother     Cerebrovascular Disease Father          Current Outpatient Medications   Medication Sig Dispense Refill    apixaban ANTICOAGULANT (ELIQUIS) 5 MG tablet Take 1 tablet (5 mg) by mouth 2 times daily. 60 tablet 2    atorvastatin (LIPITOR) 80 MG tablet Take 1 tablet (80 mg) by mouth every evening. 90 tablet 3    blood glucose (NO BRAND SPECIFIED) lancets standard Use to test blood sugar 4 (before meals and before bedtime) times daily or as directed. 100 Lancet 0    blood glucose (NO BRAND SPECIFIED) test strip Use to test blood sugar 4 times daily (Before meals and before bedtime) or as directed. 100 strip 0    blood glucose monitoring (NO BRAND SPECIFIED) meter device kit Use to test blood sugar 2 times daily  or as directed. 1 kit 0    insulin aspart (NOVOLOG PEN) 100 UNIT/ML pen Inject 1-7 Units subcutaneously 3 times daily (before meals). 15 mL 0    insulin aspart (NOVOLOG PEN) 100 UNIT/ML pen Inject 1-5 Units subcutaneously at bedtime. 15 mL 0    Insulin Glargine-yfgn 100 UNIT/ML SOPN Inject 15 Units subcutaneously every morning. 15 mL 2    insulin pen needle (32G X 4 MM) 32G X 4 MM miscellaneous Use 2 pen needles daily or as directed. 100 each 0    losartan (COZAAR) 25 MG tablet Take 1 tablet (25 mg) by mouth daily. 90 tablet 3    metFORMIN (GLUCOPHAGE XR) 500 MG 24 hr tablet Take 1 tablet (500 mg) by mouth 2 times daily (with meals). 180 tablet 3    polyethylene glycol (MIRALAX) 17 GM/Dose powder Take 17 g by mouth daily. 510 g 0     No Known Allergies  Recent Labs   Lab Test 10/11/24  0909 10/03/24  0541 09/25/24  0557 09/23/24  2353 09/23/24  2156   A1C  --   --   --   --  12.8*   LDL  --   --   --  148*  --    HDL  --   --   --  55  --    TRIG  --   --   --  79  --    ALT 19  --   --   --  21   CR 0.91 0.92   < >  --  1.13   GFRESTIMATED >90 >90   < >  --  71   POTASSIUM 4.3 3.6   < >  --  3.8    < > = values in this interval not displayed.      Current providers sharing in care for this patient include:  Patient Care Team:  Wilfred Rodriguez PA-C as PCP - General (Physician Assistant - Medical)  Ben Ortega MD as MD (Neurology)  Wilfred Rodriguez PA-C as Assigned PCP    The following health maintenance items are reviewed in Epic and correct as of today:  Health Maintenance   Topic Date Due    ADVANCE CARE PLANNING  Never done    EYE EXAM  Never done    COLORECTAL CANCER SCREENING  Never done    RSV VACCINE (1 - Risk 60-74 years 1-dose series) Never done    MEDICARE ANNUAL WELLNESS VISIT  Never done    COVID-19 Vaccine (3 - 2024-25 season) 09/01/2024    A1C  12/23/2024    LIPID  09/23/2025    BMP  10/11/2025    MICROALBUMIN  10/11/2025    DIABETIC FOOT EXAM  10/11/2025    ANNUAL REVIEW  "OF HM ORDERS  10/11/2025    FALL RISK ASSESSMENT  02/26/2026    DTAP/TDAP/TD IMMUNIZATION (2 - Td or Tdap) 07/20/2031    HEPATITIS C SCREENING  Completed    PHQ-2 (once per calendar year)  Completed    INFLUENZA VACCINE  Completed    Pneumococcal Vaccine: 50+ Years  Completed    ZOSTER IMMUNIZATION  Completed    HPV IMMUNIZATION  Aged Out    MENINGITIS IMMUNIZATION  Aged Out         Review of Systems  Constitutional, neuro, ENT, endocrine, pulmonary, cardiac, gastrointestinal, genitourinary, musculoskeletal, integument and psychiatric systems are negative, except as otherwise noted.     Objective    Exam  /80   Pulse 84   Temp 98  F (36.7  C) (Oral)   Resp 16   Ht 1.727 m (5' 8\")   Wt 78.9 kg (174 lb)   SpO2 100%   BMI 26.46 kg/m     Estimated body mass index is 26.46 kg/m  as calculated from the following:    Height as of this encounter: 1.727 m (5' 8\").    Weight as of this encounter: 78.9 kg (174 lb).    Physical Exam  GENERAL: alert and no distress  EYES: Eyes grossly normal to inspection, PERRL and conjunctivae and sclerae normal  HENT: ear canals and TM's normal, nose and mouth without ulcers or lesions  NECK: no adenopathy, no asymmetry, masses, or scars  RESP: lungs clear to auscultation - no rales, rhonchi or wheezes  CV: regular rate and rhythm, normal S1 S2, no S3 or S4, no murmur, click or rub, no peripheral edema  ABDOMEN: soft, nontender, no hepatosplenomegaly, no masses and bowel sounds normal  MS: no gross musculoskeletal defects noted, no edema  SKIN: no suspicious lesions or rashes  NEURO: Normal strength and tone, mentation intact and speech normal  PSYCH: mentation appears normal, affect normal/bright        2/26/2025   Mini Cog   Clock Draw Score 0 Abnormal   3 Item Recall 3 objects recalled   Mini Cog Total Score 3           Signed Electronically by: Wilfred Rodriguez PA-C    "

## 2025-03-12 ENCOUNTER — ORDERS ONLY (AUTO-RELEASED) (OUTPATIENT)
Dept: FAMILY MEDICINE | Facility: CLINIC | Age: 70
End: 2025-03-12

## 2025-03-12 DIAGNOSIS — Z12.11 SCREEN FOR COLON CANCER: ICD-10-CM

## 2025-06-27 ENCOUNTER — TRANSFERRED RECORDS (OUTPATIENT)
Dept: MULTI SPECIALTY CLINIC | Facility: CLINIC | Age: 70
End: 2025-06-27

## 2025-06-27 LAB — RETINOPATHY: NORMAL

## 2025-07-15 ENCOUNTER — ORDERS ONLY (AUTO-RELEASED) (OUTPATIENT)
Dept: FAMILY MEDICINE | Facility: CLINIC | Age: 70
End: 2025-07-15

## 2025-07-15 ENCOUNTER — OFFICE VISIT (OUTPATIENT)
Dept: FAMILY MEDICINE | Facility: CLINIC | Age: 70
End: 2025-07-15

## 2025-07-15 VITALS
BODY MASS INDEX: 28.05 KG/M2 | SYSTOLIC BLOOD PRESSURE: 155 MMHG | WEIGHT: 178.7 LBS | OXYGEN SATURATION: 98 % | RESPIRATION RATE: 16 BRPM | DIASTOLIC BLOOD PRESSURE: 85 MMHG | HEART RATE: 91 BPM | HEIGHT: 67 IN | TEMPERATURE: 98.7 F

## 2025-07-15 DIAGNOSIS — E11.40 POORLY CONTROLLED TYPE 2 DIABETES MELLITUS WITH NEUROPATHY (H): ICD-10-CM

## 2025-07-15 DIAGNOSIS — E78.5 HYPERLIPIDEMIA LDL GOAL <70: ICD-10-CM

## 2025-07-15 DIAGNOSIS — Z23 NEED FOR VACCINATION: ICD-10-CM

## 2025-07-15 DIAGNOSIS — Z12.5 SCREENING PSA (PROSTATE SPECIFIC ANTIGEN): ICD-10-CM

## 2025-07-15 DIAGNOSIS — Z12.11 SCREEN FOR COLON CANCER: ICD-10-CM

## 2025-07-15 DIAGNOSIS — G08 CEREBRAL VENOUS SINUS THROMBOSIS: ICD-10-CM

## 2025-07-15 DIAGNOSIS — E11.65 POORLY CONTROLLED TYPE 2 DIABETES MELLITUS WITH NEUROPATHY (H): ICD-10-CM

## 2025-07-15 DIAGNOSIS — M14.672 CHARCOT ANKLE, LEFT: ICD-10-CM

## 2025-07-15 DIAGNOSIS — I67.2 INTRACRANIAL ATHEROSCLEROSIS: ICD-10-CM

## 2025-07-15 DIAGNOSIS — I63.9 ISCHEMIC STROKE (H): ICD-10-CM

## 2025-07-15 DIAGNOSIS — I10 BENIGN ESSENTIAL HYPERTENSION: ICD-10-CM

## 2025-07-15 DIAGNOSIS — E11.65 UNCONTROLLED TYPE 2 DIABETES MELLITUS WITH HYPERGLYCEMIA (H): Primary | ICD-10-CM

## 2025-07-15 DIAGNOSIS — I63.50 POSTERIOR CIRCULATION STROKE (H): ICD-10-CM

## 2025-07-15 DIAGNOSIS — Z00.00 ENCOUNTER FOR ANNUAL WELLNESS EXAM IN MEDICARE PATIENT: ICD-10-CM

## 2025-07-15 LAB
ALBUMIN SERPL BCG-MCNC: 4.1 G/DL (ref 3.5–5.2)
ALP SERPL-CCNC: 90 U/L (ref 40–150)
ALT SERPL W P-5'-P-CCNC: 19 U/L (ref 0–70)
ANION GAP SERPL CALCULATED.3IONS-SCNC: 12 MMOL/L (ref 7–15)
AST SERPL W P-5'-P-CCNC: 19 U/L (ref 0–45)
BASOPHILS # BLD AUTO: 0 10E3/UL (ref 0–0.2)
BASOPHILS NFR BLD AUTO: 0 %
BILIRUB SERPL-MCNC: 0.2 MG/DL
BUN SERPL-MCNC: 18.5 MG/DL (ref 8–23)
CALCIUM SERPL-MCNC: 9.5 MG/DL (ref 8.8–10.4)
CHLORIDE SERPL-SCNC: 102 MMOL/L (ref 98–107)
CHOLEST SERPL-MCNC: 225 MG/DL
CREAT SERPL-MCNC: 1.06 MG/DL (ref 0.67–1.17)
EGFRCR SERPLBLD CKD-EPI 2021: 76 ML/MIN/1.73M2
EOSINOPHIL # BLD AUTO: 0.2 10E3/UL (ref 0–0.7)
EOSINOPHIL NFR BLD AUTO: 3 %
ERYTHROCYTE [DISTWIDTH] IN BLOOD BY AUTOMATED COUNT: 12.4 % (ref 10–15)
EST. AVERAGE GLUCOSE BLD GHB EST-MCNC: 212 MG/DL
FASTING STATUS PATIENT QL REPORTED: NO
FASTING STATUS PATIENT QL REPORTED: NO
GLUCOSE SERPL-MCNC: 210 MG/DL (ref 70–99)
HBA1C MFR BLD: 9 % (ref 0–5.6)
HCO3 SERPL-SCNC: 23 MMOL/L (ref 22–29)
HCT VFR BLD AUTO: 37.7 % (ref 40–53)
HDLC SERPL-MCNC: 48 MG/DL
HGB BLD-MCNC: 12.5 G/DL (ref 13.3–17.7)
IMM GRANULOCYTES # BLD: 0 10E3/UL
IMM GRANULOCYTES NFR BLD: 0 %
LDLC SERPL CALC-MCNC: 131 MG/DL
LYMPHOCYTES # BLD AUTO: 2 10E3/UL (ref 0.8–5.3)
LYMPHOCYTES NFR BLD AUTO: 28 %
MCH RBC QN AUTO: 29.9 PG (ref 26.5–33)
MCHC RBC AUTO-ENTMCNC: 33.2 G/DL (ref 31.5–36.5)
MCV RBC AUTO: 90 FL (ref 78–100)
MONOCYTES # BLD AUTO: 0.5 10E3/UL (ref 0–1.3)
MONOCYTES NFR BLD AUTO: 6 %
NEUTROPHILS # BLD AUTO: 4.6 10E3/UL (ref 1.6–8.3)
NEUTROPHILS NFR BLD AUTO: 63 %
NONHDLC SERPL-MCNC: 177 MG/DL
PLATELET # BLD AUTO: 243 10E3/UL (ref 150–450)
POTASSIUM SERPL-SCNC: 4 MMOL/L (ref 3.4–5.3)
PROT SERPL-MCNC: 7.4 G/DL (ref 6.4–8.3)
PSA SERPL DL<=0.01 NG/ML-MCNC: 0.57 NG/ML (ref 0–4.5)
RBC # BLD AUTO: 4.18 10E6/UL (ref 4.4–5.9)
SODIUM SERPL-SCNC: 137 MMOL/L (ref 135–145)
TRIGL SERPL-MCNC: 229 MG/DL
WBC # BLD AUTO: 7.3 10E3/UL (ref 4–11)

## 2025-07-15 PROCEDURE — 3079F DIAST BP 80-89 MM HG: CPT | Performed by: PHYSICIAN ASSISTANT

## 2025-07-15 PROCEDURE — 36415 COLL VENOUS BLD VENIPUNCTURE: CPT | Performed by: PHYSICIAN ASSISTANT

## 2025-07-15 PROCEDURE — 83036 HEMOGLOBIN GLYCOSYLATED A1C: CPT | Performed by: PHYSICIAN ASSISTANT

## 2025-07-15 PROCEDURE — 1126F AMNT PAIN NOTED NONE PRSNT: CPT | Performed by: PHYSICIAN ASSISTANT

## 2025-07-15 PROCEDURE — 80061 LIPID PANEL: CPT | Performed by: PHYSICIAN ASSISTANT

## 2025-07-15 PROCEDURE — G0103 PSA SCREENING: HCPCS | Performed by: PHYSICIAN ASSISTANT

## 2025-07-15 PROCEDURE — 3077F SYST BP >= 140 MM HG: CPT | Performed by: PHYSICIAN ASSISTANT

## 2025-07-15 PROCEDURE — 85025 COMPLETE CBC W/AUTO DIFF WBC: CPT | Performed by: PHYSICIAN ASSISTANT

## 2025-07-15 PROCEDURE — G2211 COMPLEX E/M VISIT ADD ON: HCPCS | Performed by: PHYSICIAN ASSISTANT

## 2025-07-15 PROCEDURE — 99214 OFFICE O/P EST MOD 30 MIN: CPT | Performed by: PHYSICIAN ASSISTANT

## 2025-07-15 PROCEDURE — 80053 COMPREHEN METABOLIC PANEL: CPT | Performed by: PHYSICIAN ASSISTANT

## 2025-07-15 RX ORDER — INSULIN GLARGINE-YFGN 100 [IU]/ML
15 INJECTION, SOLUTION SUBCUTANEOUS EVERY MORNING
Qty: 15 ML | Refills: 2 | Status: SHIPPED | OUTPATIENT
Start: 2025-07-15 | End: 2025-07-16

## 2025-07-15 RX ORDER — ASPIRIN 81 MG/1
81 TABLET ORAL DAILY
COMMUNITY

## 2025-07-15 ASSESSMENT — PAIN SCALES - GENERAL: PAINLEVEL_OUTOF10: NO PAIN (0)

## 2025-07-15 NOTE — PATIENT INSTRUCTIONS
-Cologuard for colon cancer screening. Re-ordered.   -BP elevated today. Goal is under 130/80. Can increase Losartan 50 if needed but call in 2 weeks with readings.   -Labs today with blood sugar. Will set follow-up afterwards.   -STOP Eliquis, continue ASA long term

## 2025-07-15 NOTE — PROGRESS NOTES
"Assessment & Plan     Uncontrolled type 2 diabetes mellitus with hyperglycemia (H)  Continue same dose of insulin, metformin.  Check A1c today.  Healthy diet, exercise.  Follow-up in 3 to 6 months based on results.  Will call him to schedule this after labs result.  - Insulin Glargine-yfgn 100 UNIT/ML SOPN  Dispense: 15 mL; Refill: 2  - insulin aspart (NOVOLOG PEN) 100 UNIT/ML pen  Dispense: 15 mL; Refill: 2    Hypertension  At home blood pressure monitoring for 2 weeks.  Call back with readings.  Continue losartan 25 mg daily.    Ischemic stroke (H)  Cerebral venous sinus thrombosis  Posterior circulation stroke (H)  Hyperlipidemia LDL goal <70  Intracranial atherosclerosis  HX of DVT.  Continue aspirin and statin.  Never followed up with neurology following hospital stay.  Recommended he do this.  Shared decision making in regards to Eliquis.  Reasonable to discontinue.    Charcot ankle, left  Referral placed to podiatry.  This is a likely explanation for left lower extremity edema.  Wearing compression socks which are helpful.  - Orthopedic  Referral    Need for vaccination  Flu/COVID shot in the fall    Screen for colon cancer  Needs to complete Cologuard  - COLOGUARD(EXACT SCIENCES)    The longitudinal plan of care for the diagnosis(es)/condition(s) as documented were addressed during this visit. Due to the added complexity in care, I will continue to support Francisco J in the subsequent management and with ongoing continuity of care.     BMI  Estimated body mass index is 27.72 kg/m  as calculated from the following:    Height as of this encounter: 1.71 m (5' 7.32\").    Weight as of this encounter: 81.1 kg (178 lb 11.2 oz).   Weight management plan: Discussed healthy diet and exercise guidelines  Blood sugar testing frequency justification:  Uncontrolled diabetes and Adjustment of medication(s)    No follow-ups on file.    The likelihood of other entities in the differential is insufficient to justify any " "further testing for them at this time. This was explained to the patient. The patient was advised that persistent or worsening symptoms would require further evaluation. Patient advised to call the office and if unable to reach to go to the emergency room if they develop any new or worsening symptoms. Expressed understanding and agreement with above stated plan.     [facility] ZHAO Felipe is a 70 year old male presenting for the following health issues:  Patient presents with:  Diabetes    Here for follow-up.  Last visit in February for AWV. Labs weren't completed at that time for some reason.  Needs labs today.    States overall he is doing well.  Blood sugars in the morning in the 140s/150s  Currently taking metformin 500 mg BID, NovoLog 1 to 5 units as needed, Semglee 15 units daily.  Denies hypoglycemia.  Due for A1c.  Completed diabetic eye exam recently.  Checks blood sugars 1-4 times daily.    Blood pressure elevated today which is unusual for him.  Taking losartan 25 mg daily.  Does not monitor blood pressures at home.  Denies chest pain, shortness of breath, heart racing or new leg swelling.    Has been on Eliquis now for >6 months.  History of DVT/CVA in September/October 2024.  Continues on a daily baby aspirin.    BP Readings from Last 6 Encounters:   07/15/25 (!) 155/85   02/26/25 130/80   11/06/24 125/75   10/11/24 110/65   10/03/24 (!) 151/84   09/27/24 125/73     Review of Systems   Constitutional, HEENT, cardiovascular, pulmonary, GI, , musculoskeletal, neuro, skin, endocrine and psych systems are negative, except as otherwise noted.      Objective    BP (!) 155/85   Pulse 91   Temp 98.7  F (37.1  C) (Oral)   Resp 16   Ht 1.71 m (5' 7.32\")   Wt 81.1 kg (178 lb 11.2 oz)   SpO2 98%   BMI 27.72 kg/m    5' 7.323\"  178 lbs 11.2 oz    Physical Exam   GENERAL: healthy, alert and no distress  EYES: Eyes grossly normal to inspection, PERRL " and conjunctivae and sclerae normal  NECK: no adenopathy, no asymmetry, masses, or scars and thyroid normal to palpation  RESP: lungs clear to auscultation - no rales, rhonchi or wheezes  CV: regular rate and rhythm, normal S1 S2, no S3 or S4, no murmur, click or rub, no peripheral edema and peripheral pulses strong  ABDOMEN: soft, nontender  MS: Stable left lower extremity edema.  Otherwise no gross musculoskeletal defects noted, no edema  SKIN: no suspicious lesions or rashes  NEURO: Normal strength and tone, mentation intact and speech normal  PSYCH: mentation appears normal, affect normal/bright      Answers submitted by the patient for this visit:  Diabetes Visit (Submitted on 7/15/2025)  Chief Complaint: Chronic problems general questions HPI Form  Frequency of checking blood sugars:: four or more times daily  What time of day are you checking your blood sugars : before and after meals, at bedtime  Have you had any blood sugars above 200?: No  Have you had any blood sugars below 70?: No  Hypoglycemia symptoms:: weakness  Diabetic concerns:: none  Paraesthesia present:: none of these symptoms  Have you had a diabetic eye exam within the last year?: No  General Questionnaire (Submitted on 7/15/2025)  Chief Complaint: Chronic problems general questions HPI Form  How many servings of fruits and vegetables do you eat daily?: 2-3  On average, how many sweetened beverages do you drink each day (Examples: soda, juice, sweet tea, etc.  Do NOT count diet or artificially sweetened beverages)?: 2  How many minutes a day do you exercise enough to make your heart beat faster?: 10 to 19  How many days a week do you exercise enough to make your heart beat faster?: 4  How many days per week do you miss taking your medication?: 0  Questionnaire about: Chronic problems general questions HPI Form (Submitted on 7/15/2025)  Chief Complaint: Chronic problems general questions HPI Form